# Patient Record
Sex: FEMALE | Race: WHITE | URBAN - METROPOLITAN AREA
[De-identification: names, ages, dates, MRNs, and addresses within clinical notes are randomized per-mention and may not be internally consistent; named-entity substitution may affect disease eponyms.]

---

## 2018-01-08 ENCOUNTER — EMERGENCY (EMERGENCY)
Facility: HOSPITAL | Age: 28
LOS: 0 days | Discharge: HOME | End: 2018-01-08

## 2018-01-08 DIAGNOSIS — R10.13 EPIGASTRIC PAIN: ICD-10-CM

## 2018-01-08 DIAGNOSIS — R11.2 NAUSEA WITH VOMITING, UNSPECIFIED: ICD-10-CM

## 2018-01-08 DIAGNOSIS — O42.90 PREMATURE RUPTURE OF MEMBRANES, UNSPECIFIED AS TO LENGTH OF TIME BETWEEN RUPTURE AND ONSET OF LABOR, UNSPECIFIED WEEKS OF GESTATION: ICD-10-CM

## 2018-02-20 ENCOUNTER — TRANSCRIPTION ENCOUNTER (OUTPATIENT)
Age: 28
End: 2018-02-20

## 2018-02-20 PROBLEM — Z00.00 ENCOUNTER FOR PREVENTIVE HEALTH EXAMINATION: Status: ACTIVE | Noted: 2018-02-20

## 2018-02-26 ENCOUNTER — RX RENEWAL (OUTPATIENT)
Age: 28
End: 2018-02-26

## 2018-03-02 ENCOUNTER — RX RENEWAL (OUTPATIENT)
Age: 28
End: 2018-03-02

## 2018-03-08 ENCOUNTER — LABORATORY RESULT (OUTPATIENT)
Age: 28
End: 2018-03-08

## 2018-03-08 ENCOUNTER — APPOINTMENT (OUTPATIENT)
Dept: OBGYN | Facility: CLINIC | Age: 28
End: 2018-03-08
Payer: MEDICAID

## 2018-03-08 ENCOUNTER — OUTPATIENT (OUTPATIENT)
Dept: OUTPATIENT SERVICES | Facility: HOSPITAL | Age: 28
LOS: 1 days | Discharge: HOME | End: 2018-03-08

## 2018-03-08 VITALS — HEIGHT: 69 IN | WEIGHT: 160 LBS | BODY MASS INDEX: 23.7 KG/M2

## 2018-03-08 DIAGNOSIS — Z34.00 ENCOUNTER FOR SUPERVISION OF NORMAL FIRST PREGNANCY, UNSPECIFIED TRIMESTER: ICD-10-CM

## 2018-03-08 LAB
BILIRUB UR QL STRIP: NORMAL
CLARITY UR: CLEAR
GLUCOSE UR-MCNC: NORMAL
HCG UR QL: NORMAL EU/DL
HGB UR QL STRIP.AUTO: NORMAL
KETONES UR-MCNC: NORMAL
LEUKOCYTE ESTERASE UR QL STRIP: NORMAL
NITRITE UR QL STRIP: NORMAL
PH UR STRIP: 5
PROT UR STRIP-MCNC: NORMAL
SP GR UR STRIP: 1.01

## 2018-03-08 PROCEDURE — 76830 TRANSVAGINAL US NON-OB: CPT

## 2018-03-08 PROCEDURE — 99213 OFFICE O/P EST LOW 20 MIN: CPT | Mod: 25

## 2018-03-08 PROCEDURE — 81003 URINALYSIS AUTO W/O SCOPE: CPT | Mod: QW

## 2018-04-02 ENCOUNTER — TRANSCRIPTION ENCOUNTER (OUTPATIENT)
Age: 28
End: 2018-04-02

## 2018-04-05 ENCOUNTER — APPOINTMENT (OUTPATIENT)
Dept: OBGYN | Facility: CLINIC | Age: 28
End: 2018-04-05
Payer: MEDICAID

## 2018-04-05 VITALS — HEIGHT: 69 IN | BODY MASS INDEX: 23.55 KG/M2 | WEIGHT: 159 LBS

## 2018-04-05 PROCEDURE — 99213 OFFICE O/P EST LOW 20 MIN: CPT

## 2018-04-09 LAB
BILIRUB UR QL STRIP: NORMAL
CLARITY UR: CLEAR
GLUCOSE UR-MCNC: NORMAL
HCG UR QL: NORMAL EU/DL
HGB UR QL STRIP.AUTO: NORMAL
KETONES UR-MCNC: NORMAL
LEUKOCYTE ESTERASE UR QL STRIP: NORMAL
NITRITE UR QL STRIP: NORMAL
PH UR STRIP: NORMAL
PROT UR STRIP-MCNC: NORMAL
SP GR UR STRIP: NORMAL

## 2018-04-11 ENCOUNTER — NON-APPOINTMENT (OUTPATIENT)
Age: 28
End: 2018-04-11

## 2018-04-26 ENCOUNTER — NON-APPOINTMENT (OUTPATIENT)
Age: 28
End: 2018-04-26

## 2018-04-26 ENCOUNTER — APPOINTMENT (OUTPATIENT)
Dept: OBGYN | Facility: CLINIC | Age: 28
End: 2018-04-26
Payer: MEDICAID

## 2018-04-26 VITALS — BODY MASS INDEX: 23.63 KG/M2 | DIASTOLIC BLOOD PRESSURE: 70 MMHG | WEIGHT: 160 LBS | SYSTOLIC BLOOD PRESSURE: 110 MMHG

## 2018-04-26 LAB
GLUCOSE UR-MCNC: NORMAL
HGB UR QL STRIP.AUTO: NORMAL
KETONES UR-MCNC: NORMAL
LEUKOCYTE ESTERASE UR QL STRIP: NORMAL
NITRITE UR QL STRIP: NORMAL
PROT UR STRIP-MCNC: NORMAL

## 2018-04-26 PROCEDURE — 99213 OFFICE O/P EST LOW 20 MIN: CPT

## 2018-04-30 ENCOUNTER — NON-APPOINTMENT (OUTPATIENT)
Age: 28
End: 2018-04-30

## 2018-05-10 ENCOUNTER — NON-APPOINTMENT (OUTPATIENT)
Age: 28
End: 2018-05-10

## 2018-05-17 ENCOUNTER — LABORATORY RESULT (OUTPATIENT)
Age: 28
End: 2018-05-17

## 2018-05-17 ENCOUNTER — OUTPATIENT (OUTPATIENT)
Dept: OUTPATIENT SERVICES | Facility: HOSPITAL | Age: 28
LOS: 1 days | Discharge: HOME | End: 2018-05-17

## 2018-05-17 ENCOUNTER — APPOINTMENT (OUTPATIENT)
Dept: OBGYN | Facility: CLINIC | Age: 28
End: 2018-05-17
Payer: MEDICAID

## 2018-05-17 VITALS — HEIGHT: 69 IN | WEIGHT: 164 LBS | BODY MASS INDEX: 24.29 KG/M2

## 2018-05-17 DIAGNOSIS — Z34.90 ENCOUNTER FOR SUPERVISION OF NORMAL PREGNANCY, UNSPECIFIED, UNSPECIFIED TRIMESTER: ICD-10-CM

## 2018-05-17 PROCEDURE — 99213 OFFICE O/P EST LOW 20 MIN: CPT

## 2018-05-21 LAB — ABO + RH PNL BLD: NORMAL

## 2018-05-22 ENCOUNTER — APPOINTMENT (OUTPATIENT)
Dept: OBGYN | Facility: CLINIC | Age: 28
End: 2018-05-22
Payer: MEDICAID

## 2018-05-22 PROCEDURE — 76815 OB US LIMITED FETUS(S): CPT

## 2018-05-22 PROCEDURE — 76705 ECHO EXAM OF ABDOMEN: CPT

## 2018-05-24 ENCOUNTER — NON-APPOINTMENT (OUTPATIENT)
Age: 28
End: 2018-05-24

## 2018-05-26 ENCOUNTER — NON-APPOINTMENT (OUTPATIENT)
Age: 28
End: 2018-05-26

## 2018-06-06 ENCOUNTER — CLINICAL ADVICE (OUTPATIENT)
Age: 28
End: 2018-06-06

## 2018-06-07 ENCOUNTER — APPOINTMENT (OUTPATIENT)
Dept: OBGYN | Facility: CLINIC | Age: 28
End: 2018-06-07
Payer: MEDICAID

## 2018-06-07 ENCOUNTER — NON-APPOINTMENT (OUTPATIENT)
Age: 28
End: 2018-06-07

## 2018-06-07 VITALS — WEIGHT: 168 LBS | BODY MASS INDEX: 24.81 KG/M2

## 2018-06-07 LAB
BILIRUB UR QL STRIP: NORMAL
GLUCOSE UR-MCNC: NORMAL
HCG UR QL: 1 EU/DL
HGB UR QL STRIP.AUTO: NORMAL
KETONES UR-MCNC: NORMAL
LEUKOCYTE ESTERASE UR QL STRIP: NORMAL
NITRITE UR QL STRIP: NORMAL
PH UR STRIP: 5.5
PROT UR STRIP-MCNC: 30
SP GR UR STRIP: 1.03

## 2018-06-07 PROCEDURE — 76815 OB US LIMITED FETUS(S): CPT

## 2018-06-07 PROCEDURE — 99213 OFFICE O/P EST LOW 20 MIN: CPT

## 2018-06-08 ENCOUNTER — APPOINTMENT (OUTPATIENT)
Dept: ANTEPARTUM | Facility: CLINIC | Age: 28
End: 2018-06-08

## 2018-06-08 ENCOUNTER — OUTPATIENT (OUTPATIENT)
Dept: OUTPATIENT SERVICES | Facility: HOSPITAL | Age: 28
LOS: 1 days | Discharge: HOME | End: 2018-06-08

## 2018-06-08 DIAGNOSIS — O28.3 ABNORMAL ULTRASONIC FINDING ON ANTENATAL SCREENING OF MOTHER: ICD-10-CM

## 2018-06-08 DIAGNOSIS — Q79.9 CONGENITAL MALFORMATION OF MUSCULOSKELETAL SYSTEM, UNSPECIFIED: ICD-10-CM

## 2018-06-08 DIAGNOSIS — O35.8XX1 MATERNAL CARE FOR OTHER (SUSPECTED) FETAL ABNORMALITY AND DAMAGE, FETUS 1: ICD-10-CM

## 2018-06-08 DIAGNOSIS — O09.92 SUPERVISION OF HIGH RISK PREGNANCY, UNSPECIFIED, SECOND TRIMESTER: ICD-10-CM

## 2018-06-08 DIAGNOSIS — Z3A.20 20 WEEKS GESTATION OF PREGNANCY: ICD-10-CM

## 2018-06-08 RX ORDER — ONDANSETRON 4 MG/1
4 TABLET, ORALLY DISINTEGRATING ORAL
Qty: 6 | Refills: 0 | Status: COMPLETED | COMMUNITY
Start: 2018-01-08

## 2018-06-12 ENCOUNTER — APPOINTMENT (OUTPATIENT)
Dept: OBGYN | Facility: HOSPITAL | Age: 28
End: 2018-06-12

## 2018-06-12 ENCOUNTER — INPATIENT (INPATIENT)
Facility: HOSPITAL | Age: 28
LOS: 0 days | Discharge: HOME | End: 2018-06-13
Attending: OBSTETRICS & GYNECOLOGY | Admitting: OBSTETRICS & GYNECOLOGY
Payer: MEDICAID

## 2018-06-12 VITALS — DIASTOLIC BLOOD PRESSURE: 67 MMHG | HEART RATE: 71 BPM | SYSTOLIC BLOOD PRESSURE: 110 MMHG

## 2018-06-12 LAB
AMPHET UR-MCNC: NEGATIVE — SIGNIFICANT CHANGE UP
APPEARANCE UR: CLEAR — SIGNIFICANT CHANGE UP
BARBITURATES UR SCN-MCNC: NEGATIVE — SIGNIFICANT CHANGE UP
BASOPHILS # BLD AUTO: 0.02 K/UL — SIGNIFICANT CHANGE UP (ref 0–0.2)
BASOPHILS NFR BLD AUTO: 0.2 % — SIGNIFICANT CHANGE UP (ref 0–1)
BENZODIAZ UR-MCNC: NEGATIVE — SIGNIFICANT CHANGE UP
BILIRUB UR-MCNC: NEGATIVE — SIGNIFICANT CHANGE UP
BLD GP AB SCN SERPL QL: SIGNIFICANT CHANGE UP
COCAINE METAB.OTHER UR-MCNC: NEGATIVE — SIGNIFICANT CHANGE UP
COLOR SPEC: YELLOW — SIGNIFICANT CHANGE UP
DIFF PNL FLD: NEGATIVE — SIGNIFICANT CHANGE UP
EOSINOPHIL # BLD AUTO: 0.14 K/UL — SIGNIFICANT CHANGE UP (ref 0–0.7)
EOSINOPHIL NFR BLD AUTO: 1.2 % — SIGNIFICANT CHANGE UP (ref 0–8)
GLUCOSE UR QL: NEGATIVE MG/DL — SIGNIFICANT CHANGE UP
HCT VFR BLD CALC: 34 % — LOW (ref 37–47)
HGB BLD-MCNC: 11.4 G/DL — LOW (ref 12–16)
IMM GRANULOCYTES NFR BLD AUTO: 0.9 % — HIGH (ref 0.1–0.3)
KETONES UR-MCNC: NEGATIVE — SIGNIFICANT CHANGE UP
LEUKOCYTE ESTERASE UR-ACNC: NEGATIVE — SIGNIFICANT CHANGE UP
LYMPHOCYTES # BLD AUTO: 18.2 % — LOW (ref 20.5–51.1)
LYMPHOCYTES # BLD AUTO: 2.1 K/UL — SIGNIFICANT CHANGE UP (ref 1.2–3.4)
MCHC RBC-ENTMCNC: 30.8 PG — SIGNIFICANT CHANGE UP (ref 27–31)
MCHC RBC-ENTMCNC: 33.5 G/DL — SIGNIFICANT CHANGE UP (ref 32–37)
MCV RBC AUTO: 91.9 FL — SIGNIFICANT CHANGE UP (ref 81–99)
METHADONE UR-MCNC: NEGATIVE — SIGNIFICANT CHANGE UP
MONOCYTES # BLD AUTO: 0.56 K/UL — SIGNIFICANT CHANGE UP (ref 0.1–0.6)
MONOCYTES NFR BLD AUTO: 4.8 % — SIGNIFICANT CHANGE UP (ref 1.7–9.3)
NEUTROPHILS # BLD AUTO: 8.65 K/UL — HIGH (ref 1.4–6.5)
NEUTROPHILS NFR BLD AUTO: 74.7 % — SIGNIFICANT CHANGE UP (ref 42.2–75.2)
NITRITE UR-MCNC: NEGATIVE — SIGNIFICANT CHANGE UP
NRBC # BLD: 0 /100 WBCS — SIGNIFICANT CHANGE UP (ref 0–0)
OPIATES UR-MCNC: NEGATIVE — SIGNIFICANT CHANGE UP
PCP SPEC-MCNC: SIGNIFICANT CHANGE UP
PH UR: 7.5 — SIGNIFICANT CHANGE UP (ref 5–8)
PLATELET # BLD AUTO: 177 K/UL — SIGNIFICANT CHANGE UP (ref 130–400)
PRENATAL SYPHILIS TEST: SIGNIFICANT CHANGE UP
PROPOXYPHENE QUALITATIVE URINE RESULT: NEGATIVE — SIGNIFICANT CHANGE UP
PROT UR-MCNC: NEGATIVE MG/DL — SIGNIFICANT CHANGE UP
RBC # BLD: 3.7 M/UL — LOW (ref 4.2–5.4)
RBC # FLD: 11.8 % — SIGNIFICANT CHANGE UP (ref 11.5–14.5)
SP GR SPEC: 1.01 — SIGNIFICANT CHANGE UP (ref 1.01–1.03)
TYPE + AB SCN PNL BLD: SIGNIFICANT CHANGE UP
UROBILINOGEN FLD QL: 0.2 MG/DL — SIGNIFICANT CHANGE UP (ref 0.2–0.2)
WBC # BLD: 11.57 K/UL — HIGH (ref 4.8–10.8)
WBC # FLD AUTO: 11.57 K/UL — HIGH (ref 4.8–10.8)

## 2018-06-12 PROCEDURE — 59855 INDUCED ABORTION 1+VAG SUPP: CPT

## 2018-06-12 RX ORDER — NALOXONE HYDROCHLORIDE 4 MG/.1ML
0.1 SPRAY NASAL
Qty: 0 | Refills: 0 | Status: DISCONTINUED | OUTPATIENT
Start: 2018-06-12 | End: 2018-06-13

## 2018-06-12 RX ORDER — ACETAMINOPHEN 500 MG
650 TABLET ORAL ONCE
Qty: 0 | Refills: 0 | Status: COMPLETED | OUTPATIENT
Start: 2018-06-12 | End: 2018-06-12

## 2018-06-12 RX ORDER — OXYTOCIN 10 UNIT/ML
125 VIAL (ML) INJECTION
Qty: 20 | Refills: 0 | Status: DISCONTINUED | OUTPATIENT
Start: 2018-06-12 | End: 2018-06-13

## 2018-06-12 RX ORDER — SODIUM CHLORIDE 9 MG/ML
1000 INJECTION, SOLUTION INTRAVENOUS
Qty: 0 | Refills: 0 | Status: DISCONTINUED | OUTPATIENT
Start: 2018-06-12 | End: 2018-06-13

## 2018-06-12 RX ORDER — ONDANSETRON 8 MG/1
4 TABLET, FILM COATED ORAL EVERY 6 HOURS
Qty: 0 | Refills: 0 | Status: DISCONTINUED | OUTPATIENT
Start: 2018-06-12 | End: 2018-06-13

## 2018-06-12 RX ORDER — FENTANYL/BUPIVACAINE/NS/PF 2MCG/ML-.1
250 PLASTIC BAG, INJECTION (ML) INJECTION
Qty: 0 | Refills: 0 | Status: DISCONTINUED | OUTPATIENT
Start: 2018-06-12 | End: 2018-06-13

## 2018-06-12 RX ORDER — SODIUM CHLORIDE 9 MG/ML
500 INJECTION, SOLUTION INTRAVENOUS ONCE
Qty: 0 | Refills: 0 | Status: DISCONTINUED | OUTPATIENT
Start: 2018-06-12 | End: 2018-06-13

## 2018-06-12 RX ADMIN — Medication 650 MILLIGRAM(S): at 22:57

## 2018-06-12 NOTE — PROCEDURE NOTE - ADDITIONAL PROCEDURE DETAILS
19.30pm Top off give to pt. 100mcg fentanyl with 5cc 2% lidocaine. 19.30pm Top off give to pt. 100mcg fentanyl with 5cc 2% lidocaine.  22.30pm Top off given to pt. 100mcg fentanyl with 4cc .25% bupivacaine.

## 2018-06-12 NOTE — OB PROVIDER H&P - ASSESSMENT
29 y/o  at 21w2d GA, with severe skeletal dysplasia, for IOL with cytotec.   - admit to L&D  - clear liquids  - pain management PRN  - admission labs  - cytotec 400 mg q3h for up to 5 doses.     Dr. Cage and Dr. Grayson aware. 27 y/o  at 21w2d GA, with severe skeletal dysplasia, breech presentation, for IOL with cytotec.   - admit to L&D  - clear liquids  - pain management PRN  - admission labs  - cytotec 400 mg q3h for up to 5 doses.     Dr. Cage and Dr. Grayson aware.

## 2018-06-12 NOTE — OB PROVIDER H&P - ATTENDING COMMENTS
29yo  at 21w2d admitted for IOL secondary to fetal anomalies non compatible with life.  Patient counseled extensively regarding options for pregnancy after anatomy US demonstrated severe skeletal dysplasia. Expressed desire for termination. D&E vs Cytotec induction with all the risks and benefits discussed. Decided on induction.  Plan for 400mcg Cytotec q3h vaginally up to 5 doses.  Patient desires cytogenetic testing and autopsy if possible.

## 2018-06-12 NOTE — PROGRESS NOTE ADULT - SUBJECTIVE AND OBJECTIVE BOX
PGY 2 note    Pt seen and examined at bedside for cytotec #4, 1.5/50/.3, intact.  Pt starting to feel contraction pain, mostly in her lower back.  Requesting pain mgmt at this time.  Anesthesia called.    Dr Cage and Dr Grayson aware

## 2018-06-12 NOTE — OB PROVIDER H&P - HISTORY OF PRESENT ILLNESS
29 y/o  at 21w2d dated by LMP c/w first trimester sonogram, presents for IOL for fetal anomalies. On anatomy scan fetus noted to have skeletal dysplasia with multiple shortened bones. Patient went to [facility] and genetic counseling and decided she wants to terminate the pregnancy. Declined amniocentesis for genetic workup. Denies abdominal cramping, vaginal bleeding and LOF. Feels good fetal movements. Otherwise pregnancy was uncomplicated.

## 2018-06-12 NOTE — OB RN PATIENT PROFILE - NS_NPO_OBGYN_ALL_OB_DT
Pharmacokinetic Consult - Vancomycin Initiation    Pharmacy consulted to manage vancomycin therapy for this 48 yo male with complicated skin and soft tissue infection s/p L 4th/5th toe amputation 1/30/17.     Objective  Lab Results   Component Value Date    CREATININE 0.60 02/03/2017     Estimated Creatinine Clearance: 215.1 mL/min (by C-G formula based on Cr of 0.6).     Wt Readings from Last 3 Encounters:   02/13/17 277 lb 6.4 oz (126 kg)   02/03/17 262 lb 12.8 oz (119 kg)   01/25/17 244 lb (111 kg)     Lab Results   Component Value Date    WBC 10.83 (H) 02/03/2017     Assessment/Plan    Pharmacy previously managed vancomycin therapy for Mr. Crystal last month. Trough of 11.4 mcg/mL on 2/2/17from a maintenance regimen of 1500 mg vancomycin IV q12h     Will load patient with 2000 mg IV vancomycin tonight then begin maintenance therapy tomorrow at noon with 1500 mg IV q12h. Will check serum vancomycin level Wednesday prior to 4th dose at noon. Pharmacy will continue to follow closely and adjust therapy as needed for target trough 10-15 mcg/mL.    Thank you for this consult.  Brad Flanagan IV, PharmD, BCPS  2/13/2017  8:10 PM    Addendum:    Discussed with CHINTAN Garcia - patient has been receiving vancomycin at outside facility, maintained on regimen started at [facility] earlier this month. Last dose from MAR this morning ~0900. Cancel 2000 mg IV loading dose, resume 1500 mg IV q12h tonight and assess a level tomorrow morning to ensure kinetics.     Thanks  Brad Flanagan IV, PharmD, BCPS  2/13/2017  8:21 PM       11-Jun-2018 12:00

## 2018-06-12 NOTE — OB PROVIDER H&P - NSHPLABSRESULTS_GEN_ALL_CORE
varicella immune  sequential screen negative    Sonograms:  20w5d - breech, 3VC, posterior placenta, normal AF,  grams, abnormal fetal anatomy (short left forearm, short right forearm, bowed and short right and left femurs, short right and left lower legs, micromelia).   20w4d - transverse position, fundal placenta, growth discrepancy of upper and lower extremities, skeletal dysplasia cannot be excluded  18w2d - 3 weeks discrepancy of tibia, fibula, humerus, and femur length. Suboptimal visualization of heart.   13w1d - viable IUP

## 2018-06-12 NOTE — OB PROVIDER H&P - NS_OBGYNHISTORY_OBGYN_ALL_OB_FT
Ob Hx: NSVDX1, full term, no complications, 7-4  Gyn Hx: h/o HPV 6 years ago, s/p colpo that was normal. Since then normal PAP smears. H/o ovarian cyst (conservative management). Denies fibroids and STDs.

## 2018-06-12 NOTE — OB PROVIDER H&P - NSNYCREQUIREMENTS_OBGYN_ALL_OB
ADD [facility] REQUIREMENTS SECTION ([facility]/J/SI)...

## 2018-06-12 NOTE — PROGRESS NOTE ADULT - SUBJECTIVE AND OBJECTIVE BOX
PGY1 Note:    Pt evaluated at bedside, feeling comfortable s/p recent epidural redose. Cytotec #5 placed, 400mcg, SVE 1-2/50/-3. Pt temp 100.4F, likely due to cytotec effect. No fundal tenderness, denies fevers or chills, breasts nontender, no calf swelling will continue to observe as no sign of infection.    -c/w cytotec protocol, cyctotec #5 given  -pain management PRN  -anticipate vaginal delivery    Dr. Grayson and Dr. Dailey aware

## 2018-06-12 NOTE — OB PROVIDER H&P - NSHPPHYSICALEXAM_GEN_ALL_CORE
Vital Signs Last 24 Hrs  T(C): 36.6 (12 Jun 2018 08:10), Max: 36.6 (12 Jun 2018 07:42)  T(F): 97.9 (12 Jun 2018 08:10), Max: 97.9 (12 Jun 2018 08:10)  HR: 71 (12 Jun 2018 08:10) (71 - 71)  BP: 110/67 (12 Jun 2018 08:10) (110/67 - 110/67)  RR: 18 (12 Jun 2018 08:10) (18 - 18)    abd: soft, gravid, nontender, no palpable ctx  SVE: Vital Signs Last 24 Hrs  T(C): 36.6 (12 Jun 2018 08:10), Max: 36.6 (12 Jun 2018 07:42)  T(F): 97.9 (12 Jun 2018 08:10), Max: 97.9 (12 Jun 2018 08:10)  HR: 71 (12 Jun 2018 08:10) (71 - 71)  BP: 110/67 (12 Jun 2018 08:10) (110/67 - 110/67)  RR: 18 (12 Jun 2018 08:10) (18 - 18)    abd: soft, gravid, nontender, no palpable ctx  FHR: 138 bpm  SVE: Vital Signs Last 24 Hrs  T(C): 36.6 (12 Jun 2018 08:10), Max: 36.6 (12 Jun 2018 07:42)  T(F): 97.9 (12 Jun 2018 08:10), Max: 97.9 (12 Jun 2018 08:10)  HR: 71 (12 Jun 2018 08:10) (71 - 71)  BP: 110/67 (12 Jun 2018 08:10) (110/67 - 110/67)  RR: 18 (12 Jun 2018 08:10) (18 - 18)    abd: soft, gravid, nontender, no palpable ctx  FHR: 138 bpm  SVE: c/l/p

## 2018-06-12 NOTE — PROGRESS NOTE ADULT - SUBJECTIVE AND OBJECTIVE BOX
PGY2 note    Pt seen and examined at bedside for possible SROM, red fluid noted on pad, moderate amount.    T(F): 98.24  HR: 73  BP: 110/58  SpO2: --      SVE: 1/L/ant, SROM blood tinged, breech    Meds:  fentaNYL (2 MICROgram(s)/mL) + BUpivacaine 0.1%  in Sodium Chloride 0.9% Epidural Drip 250 milliLiter(s) <Continuous>  lactated ringers Bolus 500 milliLiter(s) once  lactated ringers. 1000 milliLiter(s) (125 mL/Hr) <Continuous>  misoprostol 400 MICROGram(s) every 3 hours  oxytocin Infusion 125 milliUNIT(s)/Min (375 mL/Hr) <Continuous>      Labs:                        11.4   11.57 )-----------( 177      ( 10:31 )             34.0     Urinalysis Basic - ( 2018 13:41 )    Color: Yellow / Appearance: Clear / S.010 / pH: x  Gluc: x / Ketone: Negative  / Bili: Negative / Urobili: 0.2 mg/dL   Blood: x / Protein: Negative mg/dL / Nitrite: Negative   Leuk Esterase: Negative / RBC: x / WBC x   Sq Epi: x / Non Sq Epi: x / Bacteria: x PGY2 note    Pt seen and examined at bedside for possible SROM, red fluid noted on pad, moderate amount.    T(F): 98.24  HR: 73  BP: 110/58  SpO2: --      SVE: 1/L/ant, membranes palpated, intact, breech    Meds:  fentaNYL (2 MICROgram(s)/mL) + BUpivacaine 0.1%  in Sodium Chloride 0.9% Epidural Drip 250 milliLiter(s) <Continuous>  lactated ringers Bolus 500 milliLiter(s) once  lactated ringers. 1000 milliLiter(s) (125 mL/Hr) <Continuous>  misoprostol 400 MICROGram(s) every 3 hours  oxytocin Infusion 125 milliUNIT(s)/Min (375 mL/Hr) <Continuous>      Labs:                        11.4   11.57 )-----------( 177      ( 10:31 )             34.0     Urinalysis Basic - ( 2018 13:41 )    Color: Yellow / Appearance: Clear / S.010 / pH: x  Gluc: x / Ketone: Negative  / Bili: Negative / Urobili: 0.2 mg/dL   Blood: x / Protein: Negative mg/dL / Nitrite: Negative   Leuk Esterase: Negative / RBC: x / WBC x   Sq Epi: x / Non Sq Epi: x / Bacteria: x

## 2018-06-13 ENCOUNTER — TRANSCRIPTION ENCOUNTER (OUTPATIENT)
Age: 28
End: 2018-06-13

## 2018-06-13 ENCOUNTER — RESULT REVIEW (OUTPATIENT)
Age: 28
End: 2018-06-13

## 2018-06-13 VITALS — TEMPERATURE: 99 F | SYSTOLIC BLOOD PRESSURE: 90 MMHG | DIASTOLIC BLOOD PRESSURE: 53 MMHG | HEART RATE: 60 BPM

## 2018-06-13 LAB
BASOPHILS # BLD AUTO: 0.01 K/UL — SIGNIFICANT CHANGE UP (ref 0–0.2)
BASOPHILS NFR BLD AUTO: 0.1 % — SIGNIFICANT CHANGE UP (ref 0–1)
EOSINOPHIL # BLD AUTO: 0.12 K/UL — SIGNIFICANT CHANGE UP (ref 0–0.7)
EOSINOPHIL NFR BLD AUTO: 1 % — SIGNIFICANT CHANGE UP (ref 0–8)
HCT VFR BLD CALC: 29.3 % — LOW (ref 37–47)
HGB BLD-MCNC: 10 G/DL — LOW (ref 12–16)
IMM GRANULOCYTES NFR BLD AUTO: 1 % — HIGH (ref 0.1–0.3)
LYMPHOCYTES # BLD AUTO: 1.53 K/UL — SIGNIFICANT CHANGE UP (ref 1.2–3.4)
LYMPHOCYTES # BLD AUTO: 12.4 % — LOW (ref 20.5–51.1)
MCHC RBC-ENTMCNC: 31 PG — SIGNIFICANT CHANGE UP (ref 27–31)
MCHC RBC-ENTMCNC: 34.1 G/DL — SIGNIFICANT CHANGE UP (ref 32–37)
MCV RBC AUTO: 90.7 FL — SIGNIFICANT CHANGE UP (ref 81–99)
MONOCYTES # BLD AUTO: 0.62 K/UL — HIGH (ref 0.1–0.6)
MONOCYTES NFR BLD AUTO: 5 % — SIGNIFICANT CHANGE UP (ref 1.7–9.3)
NEUTROPHILS # BLD AUTO: 9.96 K/UL — HIGH (ref 1.4–6.5)
NEUTROPHILS NFR BLD AUTO: 80.5 % — HIGH (ref 42.2–75.2)
NRBC # BLD: 0 /100 WBCS — SIGNIFICANT CHANGE UP (ref 0–0)
PLATELET # BLD AUTO: 143 K/UL — SIGNIFICANT CHANGE UP (ref 130–400)
RBC # BLD: 3.23 M/UL — LOW (ref 4.2–5.4)
RBC # FLD: 11.7 % — SIGNIFICANT CHANGE UP (ref 11.5–14.5)
WBC # BLD: 12.36 K/UL — HIGH (ref 4.8–10.8)
WBC # FLD AUTO: 12.36 K/UL — HIGH (ref 4.8–10.8)

## 2018-06-13 RX ORDER — IBUPROFEN 200 MG
600 TABLET ORAL EVERY 6 HOURS
Qty: 0 | Refills: 0 | Status: DISCONTINUED | OUTPATIENT
Start: 2018-06-13 | End: 2018-06-13

## 2018-06-13 RX ORDER — ACETAMINOPHEN 500 MG
650 TABLET ORAL EVERY 6 HOURS
Qty: 0 | Refills: 0 | Status: DISCONTINUED | OUTPATIENT
Start: 2018-06-13 | End: 2018-06-13

## 2018-06-13 RX ORDER — HYDROCORTISONE 1 %
1 OINTMENT (GRAM) TOPICAL
Qty: 0 | Refills: 0 | DISCHARGE
Start: 2018-06-13

## 2018-06-13 RX ORDER — DIBUCAINE 1 %
1 OINTMENT (GRAM) RECTAL EVERY 4 HOURS
Qty: 0 | Refills: 0 | Status: DISCONTINUED | OUTPATIENT
Start: 2018-06-13 | End: 2018-06-13

## 2018-06-13 RX ORDER — OXYCODONE AND ACETAMINOPHEN 5; 325 MG/1; MG/1
1 TABLET ORAL
Qty: 0 | Refills: 0 | Status: DISCONTINUED | OUTPATIENT
Start: 2018-06-13 | End: 2018-06-13

## 2018-06-13 RX ORDER — DIPHENHYDRAMINE HCL 50 MG
25 CAPSULE ORAL EVERY 6 HOURS
Qty: 0 | Refills: 0 | Status: DISCONTINUED | OUTPATIENT
Start: 2018-06-13 | End: 2018-06-13

## 2018-06-13 RX ORDER — DOCUSATE SODIUM 100 MG
100 CAPSULE ORAL
Qty: 0 | Refills: 0 | Status: DISCONTINUED | OUTPATIENT
Start: 2018-06-13 | End: 2018-06-13

## 2018-06-13 RX ORDER — HYDROCORTISONE 1 %
1 OINTMENT (GRAM) TOPICAL EVERY 4 HOURS
Qty: 0 | Refills: 0 | Status: DISCONTINUED | OUTPATIENT
Start: 2018-06-13 | End: 2018-06-13

## 2018-06-13 RX ORDER — LANOLIN
1 OINTMENT (GRAM) TOPICAL EVERY 6 HOURS
Qty: 0 | Refills: 0 | Status: DISCONTINUED | OUTPATIENT
Start: 2018-06-13 | End: 2018-06-13

## 2018-06-13 RX ORDER — AER TRAVELER 0.5 G/1
1 SOLUTION RECTAL; TOPICAL EVERY 4 HOURS
Qty: 0 | Refills: 0 | Status: DISCONTINUED | OUTPATIENT
Start: 2018-06-13 | End: 2018-06-13

## 2018-06-13 RX ORDER — IBUPROFEN 200 MG
1 TABLET ORAL
Qty: 0 | Refills: 0 | DISCHARGE
Start: 2018-06-13

## 2018-06-13 RX ORDER — SIMETHICONE 80 MG/1
80 TABLET, CHEWABLE ORAL EVERY 6 HOURS
Qty: 0 | Refills: 0 | Status: DISCONTINUED | OUTPATIENT
Start: 2018-06-13 | End: 2018-06-13

## 2018-06-13 RX ORDER — GLYCERIN ADULT
1 SUPPOSITORY, RECTAL RECTAL AT BEDTIME
Qty: 0 | Refills: 0 | Status: DISCONTINUED | OUTPATIENT
Start: 2018-06-13 | End: 2018-06-13

## 2018-06-13 RX ORDER — BENZOCAINE 10 %
1 GEL (GRAM) MUCOUS MEMBRANE EVERY 6 HOURS
Qty: 0 | Refills: 0 | Status: DISCONTINUED | OUTPATIENT
Start: 2018-06-13 | End: 2018-06-13

## 2018-06-13 RX ORDER — SODIUM CHLORIDE 9 MG/ML
3 INJECTION INTRAMUSCULAR; INTRAVENOUS; SUBCUTANEOUS EVERY 8 HOURS
Qty: 0 | Refills: 0 | Status: DISCONTINUED | OUTPATIENT
Start: 2018-06-13 | End: 2018-06-13

## 2018-06-13 RX ORDER — AER TRAVELER 0.5 G/1
1 SOLUTION RECTAL; TOPICAL
Qty: 0 | Refills: 0 | DISCHARGE
Start: 2018-06-13

## 2018-06-13 RX ORDER — MAGNESIUM HYDROXIDE 400 MG/1
30 TABLET, CHEWABLE ORAL
Qty: 0 | Refills: 0 | Status: DISCONTINUED | OUTPATIENT
Start: 2018-06-13 | End: 2018-06-13

## 2018-06-13 NOTE — DISCHARGE NOTE OB - CARE PROVIDER_API CALL
Gregory Lundberg), Obstetrics and Gynecology  [address] [place] [address]  Phone: [phone]  Fax: [phone]

## 2018-06-13 NOTE — PROGRESS NOTE ADULT - ASSESSMENT
29 yo  at 21w3d GA, with severe skeletal dysplasia, breech presentation, epidural in place, s/p cytotec 400mcg x5, undergoing IOL  -anticipate vaginal delivery    Dr. Grayson and Dr. Dailey aware

## 2018-06-13 NOTE — DISCHARGE NOTE OB - HOSPITAL COURSE
DATE OF DISCHARGE: 18 @ 10:44    HISTORY OF PRESENT ILLNESS/HOSPITAL COURSE: HPI:  29 y/o  at 21w2d dated by LMP c/w first trimester sonogram, presents for IOL for fetal anomalies. On anatomy scan fetus noted to have skeletal dysplasia with multiple shortened bones. Patient went to [facility] and genetic counseling and decided she wants to terminate the pregnancy. Declined amniocentesis for genetic workup. Denies abdominal cramping, vaginal bleeding and LOF. Feels good fetal movements. Otherwise pregnancy was uncomplicated. (2018 09:20)    PAST MEDICAL & SURGICAL HISTORY:  No pertinent past medical history  No significant past surgical history      PROCEDURES PERFORMED:     COMPLICATIONS:      POST PARTUM COURSE:       FINAL DIAGNOSIS:      LABS:                       10.0   12.36 )-----------( 143      ( 2018 10:13 )             29.3       DISCHARGE INSTRUCTIONS:      If you have severe abdominal pain, heavy vaginal bleeding, shortness of breath or chest pain please call your doctor or come to the emergency room.   DIET:  Advance as tolerated.  ACTIVITY:  Advance as tolerated.  Pelvic rest for 6 weeks.  Nothing to be inserted into the vagina for 6 weeks, i.e. no tampons, douching, sexual relations, or tub baths.   FOLLOW UP: Make an appointment to see your doctor as instructed   PRESCRIPTIONS: Prescriptions:

## 2018-06-13 NOTE — DISCHARGE NOTE OB - MEDICATION SUMMARY - MEDICATIONS TO TAKE
I will START or STAY ON the medications listed below when I get home from the hospital:    ibuprofen 600 mg oral tablet  -- 1 tab(s) by mouth every 6 hours, As needed, Moderate Pain  -- Indication: For pain    hydrocortisone 1% topical cream  -- 1 application on skin every 4 hours, As needed, Moderate to Severe Perineal Pain  -- Indication: For perineal discomfort    witch hazel 50% topical pad  -- 1 application on skin every 4 hours, As needed, Perineal Discomfort  -- Indication: For 21WKS W/CYTOTEC INDUCTION

## 2018-06-13 NOTE — DISCHARGE NOTE OB - CARE PLAN
Principal Discharge DX:	Termination of pregnancy (fetus)  Goal:	vaginal delivery Principal Discharge DX:	Termination of pregnancy (fetus)  Goal:	vaginal delivery  Assessment and plan of treatment:	vaginal delivery

## 2018-06-13 NOTE — OB PROVIDER DELIVERY SUMMARY - NSPROVIDERDELIVERYNOTE_OBGYN_ALL_OB_FT
Patient complaining of pressure. On exam fetal feet and buttocks were found to be protruding from the vagina within the amniotic sac. Fetal head found sitting in vagina, passed the cervix. Delivered with maternal pushing effort. No evidence of fetal heart beat or respiratory effort. Cord clamped and cut. Placenta delivered spontaneously, appeared intact, along with about 200cc of blood clots. Fundus firm, below umbilicus. Postpartum Pitocin initiated. Good hemostasis. Tissue at placenta cord insertion collected for [employer].    Exam of fetus: Female, Apgars 0 and 0. Normal appearing facial features with fused eyelids. Low set ears. Small chest with large abdomen. Both lower and upper extremities demonstrated shortened limbs with bilateral polydactyly. Spine intact.

## 2018-06-13 NOTE — DISCHARGE NOTE OB - PATIENT PORTAL LINK FT
You can access the [facility] Patient Portal, offered by [facility], by registering with the following website: [email or web address]

## 2018-06-13 NOTE — PROGRESS NOTE ADULT - SUBJECTIVE AND OBJECTIVE BOX
PGY1 Note:    Pt examined for LOF, confirmed grossly ruptured, with light meconium. Pt is comfortable, not feeling any pain.    Vital Signs Last 24 Hrs  T(F): 99.86 (13 Jun 2018 00:31), Max: 100.4 (12 Jun 2018 22:37)  HR: 70 (13 Jun 2018 01:51) (67 - 117)  BP: 95/60 (13 Jun 2018 01:51) (75/37 - 182/131)  RR: 18 (13 Jun 2018 00:31) (18 - 20)    SVE: SROM at 0120, 7/100/+2, palpable fetal parts in vagina    Meds: cytotec 400mcg x5 doses given

## 2018-06-18 DIAGNOSIS — Z37.1 SINGLE STILLBIRTH: ICD-10-CM

## 2018-06-18 DIAGNOSIS — O35.8XX0 MATERNAL CARE FOR OTHER (SUSPECTED) FETAL ABNORMALITY AND DAMAGE, NOT APPLICABLE OR UNSPECIFIED: ICD-10-CM

## 2018-06-18 DIAGNOSIS — Z3A.21 21 WEEKS GESTATION OF PREGNANCY: ICD-10-CM

## 2018-06-18 LAB — SURGICAL PATHOLOGY STUDY: SIGNIFICANT CHANGE UP

## 2018-06-27 ENCOUNTER — APPOINTMENT (OUTPATIENT)
Dept: OBGYN | Facility: CLINIC | Age: 28
End: 2018-06-27
Payer: MEDICAID

## 2018-06-27 VITALS — BODY MASS INDEX: 24.51 KG/M2 | DIASTOLIC BLOOD PRESSURE: 80 MMHG | WEIGHT: 166 LBS | SYSTOLIC BLOOD PRESSURE: 120 MMHG

## 2018-06-27 PROCEDURE — 99024 POSTOP FOLLOW-UP VISIT: CPT

## 2018-07-03 LAB — CHROM ANALY OVERALL INTERP SPEC-IMP: SIGNIFICANT CHANGE UP

## 2018-08-01 LAB — MICROARRAY DELETION: SIGNIFICANT CHANGE UP

## 2018-11-28 ENCOUNTER — APPOINTMENT (OUTPATIENT)
Dept: OBGYN | Facility: CLINIC | Age: 28
End: 2018-11-28
Payer: MEDICAID

## 2018-11-28 ENCOUNTER — OUTPATIENT (OUTPATIENT)
Dept: OUTPATIENT SERVICES | Facility: HOSPITAL | Age: 28
LOS: 1 days | Discharge: HOME | End: 2018-11-28

## 2018-11-28 VITALS
HEIGHT: 69 IN | SYSTOLIC BLOOD PRESSURE: 110 MMHG | BODY MASS INDEX: 23.99 KG/M2 | DIASTOLIC BLOOD PRESSURE: 70 MMHG | WEIGHT: 162 LBS

## 2018-11-28 DIAGNOSIS — N91.1 SECONDARY AMENORRHEA: ICD-10-CM

## 2018-11-28 DIAGNOSIS — Z80.3 FAMILY HISTORY OF MALIGNANT NEOPLASM OF BREAST: ICD-10-CM

## 2018-11-28 LAB
BILIRUB UR QL STRIP: NORMAL
GLUCOSE UR-MCNC: NORMAL
HCG UR QL: 0.2 EU/DL
HGB UR QL STRIP.AUTO: NORMAL
KETONES UR-MCNC: NORMAL
LEUKOCYTE ESTERASE UR QL STRIP: NORMAL
NITRITE UR QL STRIP: NORMAL
PH UR STRIP: 5.5
PROT UR STRIP-MCNC: NORMAL
SP GR UR STRIP: 1

## 2018-11-28 PROCEDURE — 99213 OFFICE O/P EST LOW 20 MIN: CPT | Mod: 25

## 2018-11-28 PROCEDURE — 87075 CULTR BACTERIA EXCEPT BLOOD: CPT

## 2018-11-28 PROCEDURE — 81003 URINALYSIS AUTO W/O SCOPE: CPT | Mod: QW

## 2018-11-28 PROCEDURE — 76817 TRANSVAGINAL US OBSTETRIC: CPT

## 2018-11-28 RX ORDER — PNV NO.95/FERROUS FUM/FOLIC AC 28MG-0.8MG
TABLET ORAL
Refills: 0 | Status: ACTIVE | COMMUNITY

## 2018-11-28 RX ORDER — MELATONIN 3 MG
25 MCG TABLET ORAL
Refills: 0 | Status: ACTIVE | COMMUNITY

## 2018-11-29 LAB
HCG SERPL-MCNC: 8803 MIU/ML
PROGEST SERPL-MCNC: 22.7 NG/ML

## 2018-12-03 LAB
C TRACH RRNA SPEC QL NAA+PROBE: NOT DETECTED
N GONORRHOEA RRNA SPEC QL NAA+PROBE: NOT DETECTED
SOURCE AMPLIFICATION: NORMAL

## 2018-12-12 ENCOUNTER — APPOINTMENT (OUTPATIENT)
Dept: OBGYN | Facility: CLINIC | Age: 28
End: 2018-12-12
Payer: MEDICAID

## 2018-12-12 ENCOUNTER — OUTPATIENT (OUTPATIENT)
Dept: OUTPATIENT SERVICES | Facility: HOSPITAL | Age: 28
LOS: 1 days | Discharge: HOME | End: 2018-12-12

## 2018-12-12 VITALS — SYSTOLIC BLOOD PRESSURE: 110 MMHG | DIASTOLIC BLOOD PRESSURE: 70 MMHG

## 2018-12-12 DIAGNOSIS — Z34.90 ENCOUNTER FOR SUPERVISION OF NORMAL PREGNANCY, UNSPECIFIED, UNSPECIFIED TRIMESTER: ICD-10-CM

## 2018-12-12 PROCEDURE — 99212 OFFICE O/P EST SF 10 MIN: CPT | Mod: 25

## 2018-12-12 PROCEDURE — 76817 TRANSVAGINAL US OBSTETRIC: CPT

## 2018-12-13 LAB
ABO + RH PNL BLD: NORMAL
BASOPHILS # BLD AUTO: 0.02 K/UL
BASOPHILS NFR BLD AUTO: 0.2 %
BLD GP AB SCN SERPL QL: NORMAL
EOSINOPHIL # BLD AUTO: 0.05 K/UL
EOSINOPHIL NFR BLD AUTO: 0.4 %
HBV SURFACE AG SER QL: NONREACTIVE
HCT VFR BLD CALC: 38.4 %
HGB BLD-MCNC: 13 G/DL
HIV1+2 AB SPEC QL IA.RAPID: NONREACTIVE
IMM GRANULOCYTES NFR BLD AUTO: 0.4 %
LYMPHOCYTES # BLD AUTO: 2 K/UL
LYMPHOCYTES NFR BLD AUTO: 16.5 %
MAN DIFF?: NORMAL
MCHC RBC-ENTMCNC: 31.8 PG
MCHC RBC-ENTMCNC: 33.9 G/DL
MCV RBC AUTO: 93.9 FL
MONOCYTES # BLD AUTO: 0.7 K/UL
MONOCYTES NFR BLD AUTO: 5.8 %
NEUTROPHILS # BLD AUTO: 9.3 K/UL
NEUTROPHILS NFR BLD AUTO: 76.7 %
PLATELET # BLD AUTO: 185 K/UL
PROGEST SERPL-MCNC: 18.7 NG/ML
RBC # BLD: 4.09 M/UL
RBC # FLD: 11.7 %
RUBV IGG FLD-ACNC: 2.8 INDEX
RUBV IGG SER-IMP: POSITIVE
T PALLIDUM AB SER QL IA: NEGATIVE
VZV AB TITR SER: POSITIVE
VZV IGG SER IF-ACNC: 449.7 INDEX
WBC # FLD AUTO: 12.12 K/UL

## 2018-12-14 LAB — BACTERIA UR CULT: NORMAL

## 2018-12-16 LAB
HGB A MFR BLD: 97.3 %
HGB A2 MFR BLD: 2.7 %
HGB FRACT BLD-IMP: NORMAL

## 2019-01-03 ENCOUNTER — APPOINTMENT (OUTPATIENT)
Dept: OBGYN | Facility: CLINIC | Age: 29
End: 2019-01-03
Payer: MEDICAID

## 2019-01-03 ENCOUNTER — NON-APPOINTMENT (OUTPATIENT)
Age: 29
End: 2019-01-03

## 2019-01-03 VITALS — SYSTOLIC BLOOD PRESSURE: 100 MMHG | DIASTOLIC BLOOD PRESSURE: 70 MMHG | BODY MASS INDEX: 24.37 KG/M2 | WEIGHT: 165 LBS

## 2019-01-03 PROCEDURE — 99213 OFFICE O/P EST LOW 20 MIN: CPT | Mod: TH

## 2019-01-18 ENCOUNTER — APPOINTMENT (OUTPATIENT)
Dept: ANTEPARTUM | Facility: CLINIC | Age: 29
End: 2019-01-18

## 2019-01-18 ENCOUNTER — OUTPATIENT (OUTPATIENT)
Dept: OUTPATIENT SERVICES | Facility: HOSPITAL | Age: 29
LOS: 1 days | Discharge: HOME | End: 2019-01-18

## 2019-01-18 ENCOUNTER — RESULT CHARGE (OUTPATIENT)
Age: 29
End: 2019-01-18

## 2019-01-18 VITALS
OXYGEN SATURATION: 100 % | DIASTOLIC BLOOD PRESSURE: 58 MMHG | BODY MASS INDEX: 24 KG/M2 | HEART RATE: 71 BPM | TEMPERATURE: 97.5 F | WEIGHT: 162.5 LBS | SYSTOLIC BLOOD PRESSURE: 108 MMHG

## 2019-01-18 LAB
BILIRUB UR QL STRIP: NEGATIVE
CLARITY UR: CLEAR
COLLECTION METHOD: NORMAL
FETAL HEART RATE (BPM): 159
GLUCOSE UR-MCNC: NEGATIVE
HCG UR QL: 0.2 EU/DL
HGB UR QL STRIP.AUTO: NEGATIVE
KETONES UR-MCNC: NEGATIVE
LEUKOCYTE ESTERASE UR QL STRIP: NEGATIVE
NITRITE UR QL STRIP: NEGATIVE
OB COMMENTS: NORMAL
PH UR STRIP: 8
PROT UR STRIP-MCNC: NEGATIVE
SCHEDULED VISIT: YES
SP GR UR STRIP: 1
URINE ALBUMIN/PROTEIN: NEGATIVE
URINE GLUCOSE: NEGATIVE
URINE KETONES: NEGATIVE
WEEKS GESTATION: NORMAL

## 2019-01-18 NOTE — DISCUSSION/SUMMARY
[FreeTextEntry1] : This fetus appears to have normal chest size and limb length today on ultrasound.

## 2019-01-18 NOTE — SURGICAL HISTORY
[Fibroids] : no fibroids [Abn Paps] : no abnormal pap smears [Breast Disease] : no breast disease [STI's] : no STI's [Infertility] : no infertility [Cysts] : no cysts [OC Use] : no OC use [Last Pap: ___] : Last Pap: [unfilled] [Last Mammo: ___] : Last Mammo: none

## 2019-01-18 NOTE — OB HISTORY
[Pregnancy History] : patient received anesthesia [___] : pregnancy complications occured [LMP: ___] : LMP: [unfilled] [BHAVANI: ___] : BHAVANI: [unfilled] [Spontaneous] : Spontaneous conception [Sonogram] : sonogram [at ___ wks] : at [unfilled] weeks

## 2019-01-22 ENCOUNTER — NON-APPOINTMENT (OUTPATIENT)
Age: 29
End: 2019-01-22

## 2019-01-22 ENCOUNTER — APPOINTMENT (OUTPATIENT)
Dept: OBGYN | Facility: CLINIC | Age: 29
End: 2019-01-22
Payer: MEDICAID

## 2019-01-22 VITALS
WEIGHT: 165 LBS | DIASTOLIC BLOOD PRESSURE: 60 MMHG | HEIGHT: 69 IN | BODY MASS INDEX: 24.44 KG/M2 | SYSTOLIC BLOOD PRESSURE: 110 MMHG

## 2019-01-22 DIAGNOSIS — Z36.82 ENCOUNTER FOR ANTENATAL SCREENING FOR NUCHAL TRANSLUCENCY: ICD-10-CM

## 2019-01-22 DIAGNOSIS — O09.91 SUPERVISION OF HIGH RISK PREGNANCY, UNSPECIFIED, FIRST TRIMESTER: ICD-10-CM

## 2019-01-22 DIAGNOSIS — O35.8XX0 MATERNAL CARE FOR OTHER (SUSPECTED) FETAL ABNORMALITY AND DAMAGE, NOT APPLICABLE OR UNSPECIFIED: ICD-10-CM

## 2019-01-22 DIAGNOSIS — O09.291 SUPERVISION OF PREGNANCY WITH OTHER POOR REPRODUCTIVE OR OBSTETRIC HISTORY, FIRST TRIMESTER: ICD-10-CM

## 2019-01-22 DIAGNOSIS — O35.1XX1 MATERNAL CARE FOR (SUSPECTED) CHROMOSOMAL ABNORMALITY IN FETUS, FETUS 1: ICD-10-CM

## 2019-01-22 DIAGNOSIS — Z3A.12 12 WEEKS GESTATION OF PREGNANCY: ICD-10-CM

## 2019-01-22 PROCEDURE — 99213 OFFICE O/P EST LOW 20 MIN: CPT | Mod: TH

## 2019-02-11 ENCOUNTER — LABORATORY RESULT (OUTPATIENT)
Age: 29
End: 2019-02-11

## 2019-02-11 ENCOUNTER — APPOINTMENT (OUTPATIENT)
Dept: OBGYN | Facility: CLINIC | Age: 29
End: 2019-02-11
Payer: MEDICAID

## 2019-02-11 ENCOUNTER — OUTPATIENT (OUTPATIENT)
Dept: OUTPATIENT SERVICES | Facility: HOSPITAL | Age: 29
LOS: 1 days | Discharge: HOME | End: 2019-02-11

## 2019-02-11 ENCOUNTER — NON-APPOINTMENT (OUTPATIENT)
Age: 29
End: 2019-02-11

## 2019-02-11 VITALS — WEIGHT: 169 LBS | HEIGHT: 63 IN | BODY MASS INDEX: 29.95 KG/M2

## 2019-02-11 DIAGNOSIS — Z34.00 ENCOUNTER FOR SUPERVISION OF NORMAL FIRST PREGNANCY, UNSPECIFIED TRIMESTER: ICD-10-CM

## 2019-02-11 PROCEDURE — 99213 OFFICE O/P EST LOW 20 MIN: CPT | Mod: TH

## 2019-02-20 ENCOUNTER — NON-APPOINTMENT (OUTPATIENT)
Age: 29
End: 2019-02-20

## 2019-03-01 ENCOUNTER — OUTPATIENT (OUTPATIENT)
Dept: OUTPATIENT SERVICES | Facility: HOSPITAL | Age: 29
LOS: 1 days | End: 2019-03-01
Payer: MEDICAID

## 2019-03-01 PROCEDURE — G9001: CPT

## 2019-03-05 ENCOUNTER — APPOINTMENT (OUTPATIENT)
Dept: OBGYN | Facility: CLINIC | Age: 29
End: 2019-03-05
Payer: MEDICAID

## 2019-03-05 ENCOUNTER — OUTPATIENT (OUTPATIENT)
Dept: OUTPATIENT SERVICES | Facility: HOSPITAL | Age: 29
LOS: 1 days | Discharge: HOME | End: 2019-03-05

## 2019-03-05 ENCOUNTER — APPOINTMENT (OUTPATIENT)
Dept: ANTEPARTUM | Facility: CLINIC | Age: 29
End: 2019-03-05

## 2019-03-05 ENCOUNTER — NON-APPOINTMENT (OUTPATIENT)
Age: 29
End: 2019-03-05

## 2019-03-05 VITALS — BODY MASS INDEX: 30.65 KG/M2 | DIASTOLIC BLOOD PRESSURE: 60 MMHG | WEIGHT: 173 LBS | SYSTOLIC BLOOD PRESSURE: 100 MMHG

## 2019-03-05 PROCEDURE — 99213 OFFICE O/P EST LOW 20 MIN: CPT | Mod: TH

## 2019-03-05 PROCEDURE — 81003 URINALYSIS AUTO W/O SCOPE: CPT | Mod: QW

## 2019-03-20 DIAGNOSIS — Z71.89 OTHER SPECIFIED COUNSELING: ICD-10-CM

## 2019-03-26 ENCOUNTER — NON-APPOINTMENT (OUTPATIENT)
Age: 29
End: 2019-03-26

## 2019-03-26 ENCOUNTER — APPOINTMENT (OUTPATIENT)
Dept: OBGYN | Facility: CLINIC | Age: 29
End: 2019-03-26
Payer: MEDICAID

## 2019-03-26 VITALS — WEIGHT: 178 LBS | DIASTOLIC BLOOD PRESSURE: 60 MMHG | BODY MASS INDEX: 31.53 KG/M2 | SYSTOLIC BLOOD PRESSURE: 100 MMHG

## 2019-03-26 PROCEDURE — 99213 OFFICE O/P EST LOW 20 MIN: CPT | Mod: TH

## 2019-04-10 ENCOUNTER — RESULT REVIEW (OUTPATIENT)
Age: 29
End: 2019-04-10

## 2019-04-10 ENCOUNTER — OUTPATIENT (OUTPATIENT)
Dept: OUTPATIENT SERVICES | Facility: HOSPITAL | Age: 29
LOS: 1 days | Discharge: HOME | End: 2019-04-10

## 2019-04-10 DIAGNOSIS — Z34.00 ENCOUNTER FOR SUPERVISION OF NORMAL FIRST PREGNANCY, UNSPECIFIED TRIMESTER: ICD-10-CM

## 2019-04-10 LAB
BASOPHILS # BLD AUTO: 0.01 K/UL
BASOPHILS NFR BLD AUTO: 0.1 %
EOSINOPHIL # BLD AUTO: 0.06 K/UL
EOSINOPHIL NFR BLD AUTO: 0.6 %
GLUCOSE 1H P 100 G GLC PO SERPL-MCNC: 165 MG/DL
HCT VFR BLD CALC: 36.6 %
HGB BLD-MCNC: 12.1 G/DL
IMM GRANULOCYTES NFR BLD AUTO: 0.6 %
LYMPHOCYTES # BLD AUTO: 1.6 K/UL
LYMPHOCYTES NFR BLD AUTO: 15.2 %
MAN DIFF?: NORMAL
MCHC RBC-ENTMCNC: 31.4 PG
MCHC RBC-ENTMCNC: 33.1 G/DL
MCV RBC AUTO: 95.1 FL
MONOCYTES # BLD AUTO: 0.5 K/UL
MONOCYTES NFR BLD AUTO: 4.7 %
NEUTROPHILS # BLD AUTO: 8.32 K/UL
NEUTROPHILS NFR BLD AUTO: 78.8 %
PLATELET # BLD AUTO: 157 K/UL
RBC # BLD: 3.85 M/UL
RBC # FLD: 12.3 %
WBC # FLD AUTO: 10.55 K/UL

## 2019-04-15 ENCOUNTER — NON-APPOINTMENT (OUTPATIENT)
Age: 29
End: 2019-04-15

## 2019-04-15 ENCOUNTER — LABORATORY RESULT (OUTPATIENT)
Age: 29
End: 2019-04-15

## 2019-04-15 ENCOUNTER — OUTPATIENT (OUTPATIENT)
Dept: OUTPATIENT SERVICES | Facility: HOSPITAL | Age: 29
LOS: 1 days | Discharge: HOME | End: 2019-04-15

## 2019-04-15 ENCOUNTER — APPOINTMENT (OUTPATIENT)
Dept: OBGYN | Facility: CLINIC | Age: 29
End: 2019-04-15
Payer: MEDICAID

## 2019-04-15 VITALS — BODY MASS INDEX: 32.25 KG/M2 | WEIGHT: 182 LBS | HEIGHT: 63 IN

## 2019-04-15 DIAGNOSIS — Z34.00 ENCOUNTER FOR SUPERVISION OF NORMAL FIRST PREGNANCY, UNSPECIFIED TRIMESTER: ICD-10-CM

## 2019-04-15 PROCEDURE — 99213 OFFICE O/P EST LOW 20 MIN: CPT | Mod: TH

## 2019-04-17 LAB
BILIRUB UR QL STRIP: NORMAL
CLARITY UR: CLEAR
GLUCOSE UR-MCNC: NORMAL
HCG UR QL: NORMAL EU/DL
HGB UR QL STRIP.AUTO: NORMAL
KETONES UR-MCNC: NORMAL
LEUKOCYTE ESTERASE UR QL STRIP: NORMAL
NITRITE UR QL STRIP: NORMAL
PH UR STRIP: 8
PROT UR STRIP-MCNC: NORMAL
SP GR UR STRIP: 1.01

## 2019-04-18 ENCOUNTER — TRANSCRIPTION ENCOUNTER (OUTPATIENT)
Age: 29
End: 2019-04-18

## 2019-04-18 ENCOUNTER — NON-APPOINTMENT (OUTPATIENT)
Age: 29
End: 2019-04-18

## 2019-04-22 ENCOUNTER — CLINICAL ADVICE (OUTPATIENT)
Age: 29
End: 2019-04-22

## 2019-05-01 ENCOUNTER — OUTPATIENT (OUTPATIENT)
Dept: OUTPATIENT SERVICES | Facility: HOSPITAL | Age: 29
LOS: 1 days | Discharge: HOME | End: 2019-05-01

## 2019-05-01 VITALS — TEMPERATURE: 98 F

## 2019-05-01 VITALS — SYSTOLIC BLOOD PRESSURE: 116 MMHG | HEART RATE: 75 BPM | DIASTOLIC BLOOD PRESSURE: 67 MMHG

## 2019-05-01 NOTE — OB PROVIDER TRIAGE NOTE - NSHPPHYSICALEXAM_GEN_ALL_CORE
PHYSICAL EXAM:  T(F): 98.2 (05-01 @ 14:17)  HR: 75 (05-01 @ 14:16)  BP: 116/67 (05-01 @ 14:16)  RR: 18 (05-01 @ 14:16)  Constitutional: AAOx3, NAD  Abdomen: Soft, gravid, nontender, no palpable ctx

## 2019-05-01 NOTE — OB PROVIDER TRIAGE NOTE - PMH
<<----- Click to add NO pertinent Past Medical History No pertinent past medical history Ovarian cyst

## 2019-05-01 NOTE — OB PROVIDER TRIAGE NOTE - HISTORY OF PRESENT ILLNESS
The pt is a 29y y/o G [id number] @ 27.4 weeks  edc 7/28/19  dated by   who presents to labor & delivery c/o  low back pain with some pressure and occasional cramping that began today. Pt states has felt this before in the past 2 weeks. Pt reports no n/v/d/f/or chills no sick contacts at home.  Pt reports + fetal movement, no leaking last coitus >24 hours ago.  PT denies any urinary symptoms and states tries to drink at least one gallon of water per day.

## 2019-05-01 NOTE — OB RN TRIAGE NOTE - NS_TRIAGEADDITIONAL COMMENTS_OBGYN_ALL_OB_FT
Patient presents to labor and delivery c\o cramping and feeling of increased pressure in her vagina. Denies SROM or vaginal bleeding. Denies complications with current pregnancy. Denies dysuria, fever chills.

## 2019-05-01 NOTE — OB PROVIDER TRIAGE NOTE - NS_OBGYNHISTORY_OBGYN_ALL_OB_FT
X 1  TOP @ 21 weeks for fetal anomalies OB HX:   X 1  TOP @ 21 weeks for fetal anomalies    GYN HX:    h/o hpv 6 years ago s/p colpo  h/o ovarian cysts

## 2019-05-01 NOTE — OB PROVIDER TRIAGE NOTE - NSOBPROVIDERNOTE_OBGYN_ALL_OB_FT
A/P 30 y/o  @ 27.4 weeks + maternal/ fetal well being, not in  labor adequate CL  d/c to home with labor precautions, pt instructed to call PMD office if symptoms return  f/u next appointment as scheduled  continue to hydrate  Dr Lundberg aware

## 2019-05-09 ENCOUNTER — APPOINTMENT (OUTPATIENT)
Dept: OBGYN | Facility: CLINIC | Age: 29
End: 2019-05-09
Payer: MEDICAID

## 2019-05-09 ENCOUNTER — NON-APPOINTMENT (OUTPATIENT)
Age: 29
End: 2019-05-09

## 2019-05-09 VITALS — SYSTOLIC BLOOD PRESSURE: 100 MMHG | WEIGHT: 185 LBS | DIASTOLIC BLOOD PRESSURE: 60 MMHG | BODY MASS INDEX: 32.77 KG/M2

## 2019-05-09 PROCEDURE — 99213 OFFICE O/P EST LOW 20 MIN: CPT | Mod: TH

## 2019-05-09 PROCEDURE — 76815 OB US LIMITED FETUS(S): CPT

## 2019-05-20 ENCOUNTER — NON-APPOINTMENT (OUTPATIENT)
Age: 29
End: 2019-05-20

## 2019-05-20 ENCOUNTER — APPOINTMENT (OUTPATIENT)
Dept: OBGYN | Facility: CLINIC | Age: 29
End: 2019-05-20
Payer: MEDICAID

## 2019-05-20 VITALS — WEIGHT: 190 LBS | HEIGHT: 69 IN | BODY MASS INDEX: 28.14 KG/M2

## 2019-05-20 LAB
BILIRUB UR QL STRIP: NORMAL
CLARITY UR: CLEAR
GLUCOSE UR-MCNC: NORMAL
HCG UR QL: NORMAL EU/DL
HGB UR QL STRIP.AUTO: NORMAL
KETONES UR-MCNC: NORMAL
LEUKOCYTE ESTERASE UR QL STRIP: 25
NITRITE UR QL STRIP: NORMAL
PH UR STRIP: 9
PROT UR STRIP-MCNC: NORMAL
SP GR UR STRIP: 1.01

## 2019-05-20 PROCEDURE — 99213 OFFICE O/P EST LOW 20 MIN: CPT | Mod: TH

## 2019-06-06 ENCOUNTER — LABORATORY RESULT (OUTPATIENT)
Age: 29
End: 2019-06-06

## 2019-06-06 ENCOUNTER — OUTPATIENT (OUTPATIENT)
Dept: OUTPATIENT SERVICES | Facility: HOSPITAL | Age: 29
LOS: 1 days | Discharge: HOME | End: 2019-06-06

## 2019-06-06 ENCOUNTER — APPOINTMENT (OUTPATIENT)
Dept: OBGYN | Facility: CLINIC | Age: 29
End: 2019-06-06
Payer: MEDICAID

## 2019-06-06 ENCOUNTER — NON-APPOINTMENT (OUTPATIENT)
Age: 29
End: 2019-06-06

## 2019-06-06 DIAGNOSIS — Z34.00 ENCOUNTER FOR SUPERVISION OF NORMAL FIRST PREGNANCY, UNSPECIFIED TRIMESTER: ICD-10-CM

## 2019-06-06 PROCEDURE — 99213 OFFICE O/P EST LOW 20 MIN: CPT | Mod: TH

## 2019-06-06 PROCEDURE — 76819 FETAL BIOPHYS PROFIL W/O NST: CPT

## 2019-06-17 ENCOUNTER — APPOINTMENT (OUTPATIENT)
Dept: OBGYN | Facility: CLINIC | Age: 29
End: 2019-06-17
Payer: MEDICAID

## 2019-06-17 ENCOUNTER — OUTPATIENT (OUTPATIENT)
Dept: OUTPATIENT SERVICES | Facility: HOSPITAL | Age: 29
LOS: 1 days | Discharge: HOME | End: 2019-06-17

## 2019-06-17 ENCOUNTER — LABORATORY RESULT (OUTPATIENT)
Age: 29
End: 2019-06-17

## 2019-06-17 ENCOUNTER — NON-APPOINTMENT (OUTPATIENT)
Age: 29
End: 2019-06-17

## 2019-06-17 VITALS — BODY MASS INDEX: 28.73 KG/M2 | HEIGHT: 69 IN | WEIGHT: 194 LBS

## 2019-06-17 DIAGNOSIS — Z34.00 ENCOUNTER FOR SUPERVISION OF NORMAL FIRST PREGNANCY, UNSPECIFIED TRIMESTER: ICD-10-CM

## 2019-06-17 LAB
BILIRUB UR QL STRIP: NORMAL
CLARITY UR: CLEAR
GLUCOSE UR-MCNC: NORMAL
HCG UR QL: NORMAL EU/DL
HGB UR QL STRIP.AUTO: NORMAL
KETONES UR-MCNC: NORMAL
LEUKOCYTE ESTERASE UR QL STRIP: 500
NITRITE UR QL STRIP: NORMAL
PH UR STRIP: 7
PROT UR STRIP-MCNC: NORMAL
SP GR UR STRIP: 1.01

## 2019-06-17 PROCEDURE — 99213 OFFICE O/P EST LOW 20 MIN: CPT | Mod: TH

## 2019-06-23 ENCOUNTER — OUTPATIENT (OUTPATIENT)
Dept: OUTPATIENT SERVICES | Facility: HOSPITAL | Age: 29
LOS: 1 days | Discharge: HOME | End: 2019-06-23

## 2019-06-23 VITALS
HEART RATE: 87 BPM | DIASTOLIC BLOOD PRESSURE: 72 MMHG | SYSTOLIC BLOOD PRESSURE: 122 MMHG | TEMPERATURE: 99 F | RESPIRATION RATE: 18 BRPM

## 2019-06-23 NOTE — OB PROVIDER TRIAGE NOTE - NSHPLABSRESULTS_GEN_ALL_CORE
GTT 71/156/118/79    12/12/18   O pos   HbsAg NR; RPR NR; HIV NR  Rubella/varicella immune     6/17/19   GBS neg     6/6/19   HIV NR   Measles NON-IMMUNE     ega 32.5w- efw 4lb6oz; cephalic, MVP 7.2cm, BPP 8/8   ega 28.5w- efw [id number]; transverse, post placenta  ega 19.3w- efw 348g, variable presentation, 3vc, post placenta, MVP 6cm; normal anatomy

## 2019-06-23 NOTE — OB PROVIDER TRIAGE NOTE - HISTORY OF PRESENT ILLNESS
28yo  at 35w1d GA, by LMP c/w 1st trim sono, c/o LOF, clear at 1700; panty liner since then appeared wet; not saturated. Associated with irregular cramping, not painful. Denies VB. Good FM. Denies fever, chills, nausea/vomiting, diarrhea/constipation, dysuria, urgency, frequency. Elevated GCT, normal GTT this pregnancy. Last BM today. Denies other complications during this pregnancy. Last exam 1 week ago-1cm dilated

## 2019-06-23 NOTE — OB PROVIDER TRIAGE NOTE - NSOBPROVIDERNOTE_OBGYN_ALL_OB_FT
28yo  at 35w1d GA, GBS neg, with BPP 8/8, reassuring maternal and fetal status, not ruptured, not in  labor   - d/c home   - ambulation/PO hydration   -  labor precautions/FKC   - f/u next scheduled appt     Dr. Lundberg and Dr. Harley aware

## 2019-06-23 NOTE — OB PROVIDER TRIAGE NOTE - NSHPPHYSICALEXAM_GEN_ALL_CORE
Vital Signs Last 24 Hrs  T(C): 37.3 (23 Jun 2019 19:19), Max: 37.3 (23 Jun 2019 19:19)  T(F): 99.1 (23 Jun 2019 19:19), Max: 99.1 (23 Jun 2019 19:19)  HR: 87 (23 Jun 2019 19:19) (87 - 87)  BP: 122/72 (23 Jun 2019 19:19) (122/72 - 122/72)  RR: 18 (23 Jun 2019 19:19) (18 - 18)    Udip: neg     EFM: 150/mod/accels+  [place]: irreg ctx  Abd: soft, gravid, nontender, no palpable ctx  [place]: cervix appears normal, pooling/nitrazine/ferning neg   SVE: 1/50/-3, vtx by sono, intact

## 2019-06-23 NOTE — OB PROVIDER TRIAGE NOTE - NS_OBGYNHISTORY_OBGYN_ALL_OB_FT
ObHx: 16- FT  x1, M-7lb4oz at 39w GA; 18- etopX1 at 21w GA; IOL/ for multiple fetal anomalies on ultrasound     GynHx: h/o conservatively managed ovarian cysts; denies h/o fibroids, abnormal pap smears, STIs.

## 2019-06-24 ENCOUNTER — APPOINTMENT (OUTPATIENT)
Dept: OBGYN | Facility: CLINIC | Age: 29
End: 2019-06-24
Payer: MEDICAID

## 2019-06-24 ENCOUNTER — NON-APPOINTMENT (OUTPATIENT)
Age: 29
End: 2019-06-24

## 2019-06-24 VITALS — BODY MASS INDEX: 29.33 KG/M2 | HEIGHT: 69 IN | WEIGHT: 198 LBS

## 2019-06-24 LAB
BILIRUB UR QL STRIP: NORMAL
CLARITY UR: CLEAR
GLUCOSE UR-MCNC: NORMAL
HCG UR QL: NORMAL EU/DL
HGB UR QL STRIP.AUTO: NORMAL
KETONES UR-MCNC: NORMAL
LEUKOCYTE ESTERASE UR QL STRIP: 25
NITRITE UR QL STRIP: NORMAL
PH UR STRIP: 8
PROT UR STRIP-MCNC: NORMAL
SP GR UR STRIP: 1.01

## 2019-06-24 PROCEDURE — 99213 OFFICE O/P EST LOW 20 MIN: CPT | Mod: TH

## 2019-06-25 ENCOUNTER — RESULT REVIEW (OUTPATIENT)
Age: 29
End: 2019-06-25

## 2019-07-01 ENCOUNTER — NON-APPOINTMENT (OUTPATIENT)
Age: 29
End: 2019-07-01

## 2019-07-01 ENCOUNTER — APPOINTMENT (OUTPATIENT)
Dept: OBGYN | Facility: CLINIC | Age: 29
End: 2019-07-01
Payer: MEDICAID

## 2019-07-01 ENCOUNTER — OUTPATIENT (OUTPATIENT)
Dept: OUTPATIENT SERVICES | Facility: HOSPITAL | Age: 29
LOS: 1 days | End: 2019-07-01

## 2019-07-01 VITALS — BODY MASS INDEX: 29.18 KG/M2 | HEIGHT: 69 IN | WEIGHT: 197 LBS

## 2019-07-01 PROCEDURE — 99213 OFFICE O/P EST LOW 20 MIN: CPT | Mod: TH

## 2019-07-08 ENCOUNTER — NON-APPOINTMENT (OUTPATIENT)
Age: 29
End: 2019-07-08

## 2019-07-08 ENCOUNTER — APPOINTMENT (OUTPATIENT)
Dept: OBGYN | Facility: CLINIC | Age: 29
End: 2019-07-08
Payer: MEDICAID

## 2019-07-08 VITALS — BODY MASS INDEX: 29.33 KG/M2 | WEIGHT: 198 LBS | HEIGHT: 69 IN

## 2019-07-08 LAB
BILIRUB UR QL STRIP: NORMAL
CLARITY UR: CLEAR
GLUCOSE UR-MCNC: NORMAL
HCG UR QL: NORMAL EU/DL
HGB UR QL STRIP.AUTO: NORMAL
KETONES UR-MCNC: NORMAL
LEUKOCYTE ESTERASE UR QL STRIP: NORMAL
NITRITE UR QL STRIP: NORMAL
PH UR STRIP: 6
PROT UR STRIP-MCNC: NORMAL
SP GR UR STRIP: 1.02

## 2019-07-08 PROCEDURE — 99213 OFFICE O/P EST LOW 20 MIN: CPT | Mod: TH

## 2019-07-18 ENCOUNTER — NON-APPOINTMENT (OUTPATIENT)
Age: 29
End: 2019-07-18

## 2019-07-18 ENCOUNTER — APPOINTMENT (OUTPATIENT)
Dept: OBGYN | Facility: CLINIC | Age: 29
End: 2019-07-18
Payer: MEDICAID

## 2019-07-18 VITALS — BODY MASS INDEX: 29.83 KG/M2 | WEIGHT: 202 LBS

## 2019-07-18 LAB
BILIRUB UR QL STRIP: NORMAL
GLUCOSE UR-MCNC: NORMAL
HCG UR QL: NORMAL EU/DL
HGB UR QL STRIP.AUTO: NORMAL
KETONES UR-MCNC: NORMAL
LEUKOCYTE ESTERASE UR QL STRIP: NORMAL
NITRITE UR QL STRIP: NORMAL
PH UR STRIP: 7
PROT UR STRIP-MCNC: NORMAL
SP GR UR STRIP: 1

## 2019-07-18 PROCEDURE — 99213 OFFICE O/P EST LOW 20 MIN: CPT | Mod: TH

## 2019-07-21 ENCOUNTER — INPATIENT (INPATIENT)
Facility: HOSPITAL | Age: 29
LOS: 2 days | Discharge: HOME | End: 2019-07-24
Attending: OBSTETRICS & GYNECOLOGY | Admitting: OBSTETRICS & GYNECOLOGY
Payer: MEDICAID

## 2019-07-21 VITALS — HEART RATE: 90 BPM | DIASTOLIC BLOOD PRESSURE: 64 MMHG | SYSTOLIC BLOOD PRESSURE: 113 MMHG | TEMPERATURE: 98 F

## 2019-07-21 LAB
APPEARANCE UR: CLEAR — SIGNIFICANT CHANGE UP
BASOPHILS # BLD AUTO: 0.03 K/UL — SIGNIFICANT CHANGE UP (ref 0–0.2)
BASOPHILS NFR BLD AUTO: 0.2 % — SIGNIFICANT CHANGE UP (ref 0–1)
BILIRUB UR-MCNC: NEGATIVE — SIGNIFICANT CHANGE UP
BLD GP AB SCN SERPL QL: SIGNIFICANT CHANGE UP
COLOR SPEC: SIGNIFICANT CHANGE UP
DIFF PNL FLD: NEGATIVE — SIGNIFICANT CHANGE UP
EOSINOPHIL # BLD AUTO: 0.04 K/UL — SIGNIFICANT CHANGE UP (ref 0–0.7)
EOSINOPHIL NFR BLD AUTO: 0.3 % — SIGNIFICANT CHANGE UP (ref 0–8)
GLUCOSE UR QL: NEGATIVE — SIGNIFICANT CHANGE UP
HCT VFR BLD CALC: 35.8 % — LOW (ref 37–47)
HGB BLD-MCNC: 11.9 G/DL — LOW (ref 12–16)
IMM GRANULOCYTES NFR BLD AUTO: 0.9 % — HIGH (ref 0.1–0.3)
KETONES UR-MCNC: NEGATIVE — SIGNIFICANT CHANGE UP
LEUKOCYTE ESTERASE UR-ACNC: NEGATIVE — SIGNIFICANT CHANGE UP
LYMPHOCYTES # BLD AUTO: 17.4 % — LOW (ref 20.5–51.1)
LYMPHOCYTES # BLD AUTO: 2.29 K/UL — SIGNIFICANT CHANGE UP (ref 1.2–3.4)
MCHC RBC-ENTMCNC: 31.4 PG — HIGH (ref 27–31)
MCHC RBC-ENTMCNC: 33.2 G/DL — SIGNIFICANT CHANGE UP (ref 32–37)
MCV RBC AUTO: 94.5 FL — SIGNIFICANT CHANGE UP (ref 81–99)
MONOCYTES # BLD AUTO: 0.79 K/UL — HIGH (ref 0.1–0.6)
MONOCYTES NFR BLD AUTO: 6 % — SIGNIFICANT CHANGE UP (ref 1.7–9.3)
NEUTROPHILS # BLD AUTO: 9.87 K/UL — HIGH (ref 1.4–6.5)
NEUTROPHILS NFR BLD AUTO: 75.2 % — SIGNIFICANT CHANGE UP (ref 42.2–75.2)
NITRITE UR-MCNC: NEGATIVE — SIGNIFICANT CHANGE UP
NRBC # BLD: 0 /100 WBCS — SIGNIFICANT CHANGE UP (ref 0–0)
PH UR: 7.5 — SIGNIFICANT CHANGE UP (ref 5–8)
PLATELET # BLD AUTO: 154 K/UL — SIGNIFICANT CHANGE UP (ref 130–400)
PRENATAL SYPHILIS TEST: SIGNIFICANT CHANGE UP
PROT UR-MCNC: SIGNIFICANT CHANGE UP
RBC # BLD: 3.79 M/UL — LOW (ref 4.2–5.4)
RBC # FLD: 12.6 % — SIGNIFICANT CHANGE UP (ref 11.5–14.5)
SP GR SPEC: 1.01 — LOW (ref 1.01–1.02)
UROBILINOGEN FLD QL: SIGNIFICANT CHANGE UP
WBC # BLD: 13.14 K/UL — HIGH (ref 4.8–10.8)
WBC # FLD AUTO: 13.14 K/UL — HIGH (ref 4.8–10.8)

## 2019-07-21 PROCEDURE — S2140: CPT

## 2019-07-21 PROCEDURE — 59409 OBSTETRICAL CARE: CPT | Mod: U7

## 2019-07-21 RX ORDER — OXYTOCIN 10 UNIT/ML
333.33 VIAL (ML) INJECTION
Qty: 20 | Refills: 0 | Status: DISCONTINUED | OUTPATIENT
Start: 2019-07-21 | End: 2019-07-22

## 2019-07-21 RX ORDER — SODIUM CHLORIDE 9 MG/ML
1000 INJECTION, SOLUTION INTRAVENOUS
Refills: 0 | Status: DISCONTINUED | OUTPATIENT
Start: 2019-07-21 | End: 2019-07-21

## 2019-07-21 RX ORDER — SODIUM CHLORIDE 9 MG/ML
1000 INJECTION, SOLUTION INTRAVENOUS
Refills: 0 | Status: DISCONTINUED | OUTPATIENT
Start: 2019-07-21 | End: 2019-07-22

## 2019-07-21 RX ORDER — OXYTOCIN 10 UNIT/ML
2 VIAL (ML) INJECTION
Qty: 30 | Refills: 0 | Status: DISCONTINUED | OUTPATIENT
Start: 2019-07-21 | End: 2019-07-22

## 2019-07-21 NOTE — OB PROVIDER H&P - NSHPPHYSICALEXAM_GEN_ALL_CORE
Vital Signs Last 24 Hrs  T(F): 98.06 (21 Jul 2019 18:40), Max: 98.06 (21 Jul 2019 18:40)  HR: 90 (21 Jul 2019 18:40) (90 - 90)  BP: 113/64 (21 Jul 2019 18:40) (113/64 - 113/64)  RR: 14    EFM: 140/mod/accels  [place]: q1-2min    Gen: AAOx3, NAD  Abd: Gravid, soft, nontender, no palpable contractions Vital Signs Last 24 Hrs  T(F): 98.06 (21 Jul 2019 18:40), Max: 98.06 (21 Jul 2019 18:40)  HR: 90 (21 Jul 2019 18:40) (90 - 90)  BP: 113/64 (21 Jul 2019 18:40) (113/64 - 113/64)  RR: 14    EFM: 140/mod/accels  [place]: irregular    Gen: AAOx3, NAD  Abd: Gravid, soft, nontender, no palpable contractions

## 2019-07-21 NOTE — OB PROVIDER H&P - HISTORY OF PRESENT ILLNESS
30 yo  at 39w1d GA by LMP consistent with first trimester sono presents for scheduled induction of labor. She feels occasional contractions. She denies leakage of fluid or vaginal bleeding. She feels regular fetal movement. Last pregnancy complicated by skeletal dysplasia, normal genetic screening this pregnancy. 28 yo  at 39w1d GA by LMP consistent with first trimester sono presents for scheduled induction of labor. She feels occasional contractions. She denies leakage of fluid or vaginal bleeding. She feels regular fetal movement. Last pregnancy complicated by 21 week termination for skeletal dysplasia, normal genetic screening this pregnancy.

## 2019-07-21 NOTE — OB PROVIDER H&P - ASSESSMENT
28 yo  at 39w1d GA, GBS negative, positive GCT negative GTT, h/o previous pregnancy with skeletal dysplase with normal anatomy scan this pregnancy, NIPT low risk male, sequential screen low risk, for induction of labor  Admit to L&D  Continuous EFM and toco  IV fluids  Admission labs  Clear liquid diet  Pain management PRN    Discussed with 28 yo  at 39w1d GA, GBS negative, positive GCT negative GTT, h/o previous pregnancy with skeletal dysplasia with normal anatomy scan this pregnancy, NIPT low risk male, sequential screen low risk, for induction of labor    Admit to L&D  Continuous EFM and toco  IV fluids  Admission labs  Clear liquid diet  Pain management PRN  Pitocin for induction    Discussed with Dr. Lundberg

## 2019-07-21 NOTE — OB PROVIDER H&P - NSHPLABSRESULTS_GEN_ALL_CORE
Sonos:  32w5d BPP 8/8, MVP 7.2cm, 4lb6oz, vertex  28w5d transverse, posterior palcenta, amniotic fluid WNL, "long bones all measure adequately"  19w3d 348g, variable presentation, 3 vessel cord, posterior placenta, MVP 60mm, normal anatomic survey  7w4d SIUP consistent with dates

## 2019-07-21 NOTE — OB PROVIDER H&P - NS_OBGYNHISTORY_OBGYN_ALL_OB_FT
OB: , FT  x1 uncomplicated; vaginal delivery of infant with skeletal dysplasia.  GYN: Denies history of fibroids, cysts, abnormal paps, or STIs. OB: , FT  x1 uncomplicated; 21 week medically induced termination of pregnancy delivered vaginally for skeletal dysplasia.  GYN: Denies history of fibroids, cysts, abnormal paps, or STIs.

## 2019-07-21 NOTE — PROGRESS NOTE ADULT - SUBJECTIVE AND OBJECTIVE BOX
PGY1 Note    Patient seen at bedside. No complaints at the moment.    T(F): 98.24 ( @ 21:31), Max: 98.24 ( @ 21:31)  HR: 70 ( @ 22:18)  BP: 103/56 ( @ 22:16) (90/46 - 127/66)  RR: 18 ( @ 18:43)    EFM: 140bpm/moderate variability/+accels; Cat 1 tracing  TOCO: q3-4mins  SVE: 370/-1, AROM, clear per Dr. Lundberg    Medications:  pitocin at 10u      Labs:                        11.9   13.14 )-----------( 154      ( 2019 20:20 )             35.8           Prenatal Syphilis Test: Nonreact ( @ 20:20)  Antibody Screen: NEG (19 @ 20:20)    Urinalysis Basic - ( 2019 19:30 )    Color: Light Yellow / Appearance: Clear / S.008 / pH: x  Gluc: x / Ketone: Negative  / Bili: Negative / Urobili: <2 mg/dL   Blood: x / Protein: Trace / Nitrite: Negative   Leuk Esterase: Negative / RBC: x / WBC x   Sq Epi: x / Non Sq Epi: x / Bacteria: x        A/P:   30 yo  at 39w1d GA, Rh positive, GBS negative, h/o previous pregnancy with skeletal dysplasia with normal anatomy scan this pregnancy, NIPT low risk male, sequential screen low risk,, s/p epidural, on pitocin, in labor progressing well.  -Continue current management   -Pain management prn  -Continuous EFM/toco  -F/u pending labs  -Reevaluate     Dr. Mack aware and Dr. Lundberg to be made aware. PGY1 Note    Patient seen at bedside. No complaints at the moment.    T(F): 98.24 ( @ 21:31), Max: 98.24 ( @ 21:31)  HR: 70 ( @ 22:18)  BP: 103/56 ( @ 22:16) (90/46 - 127/66)  RR: 18 ( @ 18:43)    EFM: 140bpm/moderate variability/+accels; Cat 1 tracing  TOCO: q3-4mins  SVE: 3/70/-1, AROM, clear per Dr. Lundberg    Medications:  pitocin at 10u      Labs:                        11.9   13.14 )-----------( 154      ( 2019 20:20 )             35.8           Prenatal Syphilis Test: Nonreact ( @ 20:20)  Antibody Screen: NEG (19 @ 20:20)    Urinalysis Basic - ( 2019 19:30 )    Color: Light Yellow / Appearance: Clear / S.008 / pH: x  Gluc: x / Ketone: Negative  / Bili: Negative / Urobili: <2 mg/dL   Blood: x / Protein: Trace / Nitrite: Negative   Leuk Esterase: Negative / RBC: x / WBC x   Sq Epi: x / Non Sq Epi: x / Bacteria: x

## 2019-07-22 LAB
AMPHET UR-MCNC: NEGATIVE — SIGNIFICANT CHANGE UP
BARBITURATES UR SCN-MCNC: NEGATIVE — SIGNIFICANT CHANGE UP
BASOPHILS # BLD AUTO: 0.03 K/UL — SIGNIFICANT CHANGE UP (ref 0–0.2)
BASOPHILS NFR BLD AUTO: 0.2 % — SIGNIFICANT CHANGE UP (ref 0–1)
BENZODIAZ UR-MCNC: NEGATIVE — SIGNIFICANT CHANGE UP
BUPRENORPHINE SCREEN, URINE RESULT: NEGATIVE — SIGNIFICANT CHANGE UP
COCAINE METAB.OTHER UR-MCNC: NEGATIVE — SIGNIFICANT CHANGE UP
EOSINOPHIL # BLD AUTO: 0.12 K/UL — SIGNIFICANT CHANGE UP (ref 0–0.7)
EOSINOPHIL NFR BLD AUTO: 0.9 % — SIGNIFICANT CHANGE UP (ref 0–8)
HCT VFR BLD CALC: 33.2 % — LOW (ref 37–47)
HGB BLD-MCNC: 10.9 G/DL — LOW (ref 12–16)
IMM GRANULOCYTES NFR BLD AUTO: 0.8 % — HIGH (ref 0.1–0.3)
L&D DRUG SCREEN, URINE: SIGNIFICANT CHANGE UP
LYMPHOCYTES # BLD AUTO: 2.76 K/UL — SIGNIFICANT CHANGE UP (ref 1.2–3.4)
LYMPHOCYTES # BLD AUTO: 20.7 % — SIGNIFICANT CHANGE UP (ref 20.5–51.1)
MCHC RBC-ENTMCNC: 31.7 PG — HIGH (ref 27–31)
MCHC RBC-ENTMCNC: 32.8 G/DL — SIGNIFICANT CHANGE UP (ref 32–37)
MCV RBC AUTO: 96.5 FL — SIGNIFICANT CHANGE UP (ref 81–99)
METHADONE UR-MCNC: NEGATIVE — SIGNIFICANT CHANGE UP
MONOCYTES # BLD AUTO: 0.86 K/UL — HIGH (ref 0.1–0.6)
MONOCYTES NFR BLD AUTO: 6.5 % — SIGNIFICANT CHANGE UP (ref 1.7–9.3)
NEUTROPHILS # BLD AUTO: 9.43 K/UL — HIGH (ref 1.4–6.5)
NEUTROPHILS NFR BLD AUTO: 70.9 % — SIGNIFICANT CHANGE UP (ref 42.2–75.2)
NRBC # BLD: 0 /100 WBCS — SIGNIFICANT CHANGE UP (ref 0–0)
OPIATES UR-MCNC: NEGATIVE — SIGNIFICANT CHANGE UP
OXYCODONE UR-MCNC: NEGATIVE — SIGNIFICANT CHANGE UP
PCP UR-MCNC: NEGATIVE — SIGNIFICANT CHANGE UP
PLATELET # BLD AUTO: 135 K/UL — SIGNIFICANT CHANGE UP (ref 130–400)
PROPOXYPHENE QUALITATIVE URINE RESULT: NEGATIVE — SIGNIFICANT CHANGE UP
RBC # BLD: 3.44 M/UL — LOW (ref 4.2–5.4)
RBC # FLD: 12.6 % — SIGNIFICANT CHANGE UP (ref 11.5–14.5)
WBC # BLD: 13.31 K/UL — HIGH (ref 4.8–10.8)
WBC # FLD AUTO: 13.31 K/UL — HIGH (ref 4.8–10.8)

## 2019-07-22 RX ORDER — GLYCERIN ADULT
1 SUPPOSITORY, RECTAL RECTAL AT BEDTIME
Refills: 0 | Status: DISCONTINUED | OUTPATIENT
Start: 2019-07-22 | End: 2019-07-24

## 2019-07-22 RX ORDER — SIMETHICONE 80 MG/1
80 TABLET, CHEWABLE ORAL EVERY 4 HOURS
Refills: 0 | Status: DISCONTINUED | OUTPATIENT
Start: 2019-07-22 | End: 2019-07-24

## 2019-07-22 RX ORDER — LANOLIN
1 OINTMENT (GRAM) TOPICAL EVERY 6 HOURS
Refills: 0 | Status: DISCONTINUED | OUTPATIENT
Start: 2019-07-22 | End: 2019-07-24

## 2019-07-22 RX ORDER — OXYCODONE HYDROCHLORIDE 5 MG/1
5 TABLET ORAL
Refills: 0 | Status: DISCONTINUED | OUTPATIENT
Start: 2019-07-22 | End: 2019-07-24

## 2019-07-22 RX ORDER — AER TRAVELER 0.5 G/1
1 SOLUTION RECTAL; TOPICAL EVERY 4 HOURS
Refills: 0 | Status: DISCONTINUED | OUTPATIENT
Start: 2019-07-22 | End: 2019-07-24

## 2019-07-22 RX ORDER — DIPHENHYDRAMINE HCL 50 MG
25 CAPSULE ORAL EVERY 6 HOURS
Refills: 0 | Status: DISCONTINUED | OUTPATIENT
Start: 2019-07-22 | End: 2019-07-24

## 2019-07-22 RX ORDER — DOCUSATE SODIUM 100 MG
100 CAPSULE ORAL
Refills: 0 | Status: DISCONTINUED | OUTPATIENT
Start: 2019-07-22 | End: 2019-07-24

## 2019-07-22 RX ORDER — IBUPROFEN 200 MG
600 TABLET ORAL EVERY 6 HOURS
Refills: 0 | Status: DISCONTINUED | OUTPATIENT
Start: 2019-07-22 | End: 2019-07-24

## 2019-07-22 RX ORDER — OXYCODONE HYDROCHLORIDE 5 MG/1
5 TABLET ORAL ONCE
Refills: 0 | Status: DISCONTINUED | OUTPATIENT
Start: 2019-07-22 | End: 2019-07-24

## 2019-07-22 RX ORDER — MAGNESIUM HYDROXIDE 400 MG/1
30 TABLET, CHEWABLE ORAL
Refills: 0 | Status: DISCONTINUED | OUTPATIENT
Start: 2019-07-22 | End: 2019-07-24

## 2019-07-22 RX ORDER — KETOROLAC TROMETHAMINE 30 MG/ML
30 SYRINGE (ML) INJECTION ONCE
Refills: 0 | Status: DISCONTINUED | OUTPATIENT
Start: 2019-07-22 | End: 2019-07-22

## 2019-07-22 RX ORDER — BENZOCAINE 10 %
1 GEL (GRAM) MUCOUS MEMBRANE EVERY 6 HOURS
Refills: 0 | Status: DISCONTINUED | OUTPATIENT
Start: 2019-07-22 | End: 2019-07-24

## 2019-07-22 RX ORDER — HYDROCORTISONE 1 %
1 OINTMENT (GRAM) TOPICAL EVERY 6 HOURS
Refills: 0 | Status: DISCONTINUED | OUTPATIENT
Start: 2019-07-22 | End: 2019-07-24

## 2019-07-22 RX ORDER — DIBUCAINE 1 %
1 OINTMENT (GRAM) RECTAL EVERY 6 HOURS
Refills: 0 | Status: DISCONTINUED | OUTPATIENT
Start: 2019-07-22 | End: 2019-07-24

## 2019-07-22 RX ORDER — ACETAMINOPHEN 500 MG
975 TABLET ORAL
Refills: 0 | Status: DISCONTINUED | OUTPATIENT
Start: 2019-07-22 | End: 2019-07-24

## 2019-07-22 RX ORDER — IBUPROFEN 200 MG
600 TABLET ORAL EVERY 6 HOURS
Refills: 0 | Status: COMPLETED | OUTPATIENT
Start: 2019-07-22 | End: 2020-06-19

## 2019-07-22 RX ORDER — OXYTOCIN 10 UNIT/ML
333.33 VIAL (ML) INJECTION
Qty: 20 | Refills: 0 | Status: DISCONTINUED | OUTPATIENT
Start: 2019-07-22 | End: 2019-07-24

## 2019-07-22 RX ORDER — SODIUM CHLORIDE 9 MG/ML
3 INJECTION INTRAMUSCULAR; INTRAVENOUS; SUBCUTANEOUS EVERY 8 HOURS
Refills: 0 | Status: DISCONTINUED | OUTPATIENT
Start: 2019-07-22 | End: 2019-07-24

## 2019-07-22 RX ADMIN — Medication 600 MILLIGRAM(S): at 23:59

## 2019-07-22 RX ADMIN — Medication 975 MILLIGRAM(S): at 09:00

## 2019-07-22 RX ADMIN — SODIUM CHLORIDE 3 MILLILITER(S): 9 INJECTION INTRAMUSCULAR; INTRAVENOUS; SUBCUTANEOUS at 13:36

## 2019-07-22 RX ADMIN — SODIUM CHLORIDE 3 MILLILITER(S): 9 INJECTION INTRAMUSCULAR; INTRAVENOUS; SUBCUTANEOUS at 05:09

## 2019-07-22 RX ADMIN — Medication 975 MILLIGRAM(S): at 08:22

## 2019-07-22 RX ADMIN — Medication 600 MILLIGRAM(S): at 18:05

## 2019-07-22 RX ADMIN — SODIUM CHLORIDE 3 MILLILITER(S): 9 INJECTION INTRAMUSCULAR; INTRAVENOUS; SUBCUTANEOUS at 21:12

## 2019-07-22 RX ADMIN — Medication 975 MILLIGRAM(S): at 15:41

## 2019-07-22 RX ADMIN — Medication 600 MILLIGRAM(S): at 13:23

## 2019-07-22 RX ADMIN — Medication 30 MILLIGRAM(S): at 02:45

## 2019-07-22 RX ADMIN — Medication 975 MILLIGRAM(S): at 21:13

## 2019-07-22 RX ADMIN — Medication 1 TABLET(S): at 08:22

## 2019-07-22 NOTE — PROGRESS NOTE ADULT - SUBJECTIVE AND OBJECTIVE BOX
PGY1 Note    Patient seen at bedside. No complaints at the moment.    T(F): 97.88 ( @ 01:36), Max: 98.24 ( @ 21:31)  HR: 73 ( @ 01:58)  BP: 84/47 ( @ 01:49) (78/40 - 127/66)  RR: 18 ( @ 18:43)    EFM: 135bpm/moderate variability/+accels; Cat 1 tracing  TOCO:q2mins  SVE: 5.6/80/-1 per Dr. Lundberg    Medications:  on pitocin      Labs:                        11.9   13.14 )-----------( 154      ( 2019 20:20 )             35.8         Prenatal Syphilis Test: Nonreact ( @ 20:20)  Antibody Screen: NEG (19 @ 20:20)    Urinalysis Basic - ( 2019 19:30 )    Color: Light Yellow / Appearance: Clear / S.008 / pH: x  Gluc: x / Ketone: Negative  / Bili: Negative / Urobili: <2 mg/dL   Blood: x / Protein: Trace / Nitrite: Negative   Leuk Esterase: Negative / RBC: x / WBC x   Sq Epi: x / Non Sq Epi: x / Bacteria: x PGY1 Note    Patient seen at bedside. No complaints at the moment.    T(F): 97.88 ( @ 01:36), Max: 98.24 ( @ 21:31)  HR: 73 ( @ 01:58)  BP: 84/47 ( @ 01:49) (78/40 - 127/66)  RR: 18 ( @ 18:43)    EFM: 135bpm/moderate variability/+accels; Cat 1 tracing  TOCO:q2mins  SVE: /0 per Dr. Ludnberg    Medications:  on pitocin      Labs:                        11.9   13.14 )-----------( 154      ( 2019 20:20 )             35.8         Prenatal Syphilis Test: Nonreact ( @ 20:20)  Antibody Screen: NEG (19 @ 20:20)    Urinalysis Basic - ( 2019 19:30 )    Color: Light Yellow / Appearance: Clear / S.008 / pH: x  Gluc: x / Ketone: Negative  / Bili: Negative / Urobili: <2 mg/dL   Blood: x / Protein: Trace / Nitrite: Negative   Leuk Esterase: Negative / RBC: x / WBC x   Sq Epi: x / Non Sq Epi: x / Bacteria: x

## 2019-07-22 NOTE — PROGRESS NOTE ADULT - ASSESSMENT
A/P:   30 yo  at 39w1d GA, Rh positive, GBS negative, h/o previous pregnancy with skeletal dysplasia with normal anatomy scan this pregnancy, NIPT low risk male, sequential screen low risk,, s/p epidural, on pitocin, in labor progressing well.  -Continue current management   -Pain management prn  -Continuous EFM/toco  -F/u pending labs  -Reevaluate     Dr. Mack aware and Dr. Lundberg to be made aware.
A/P:   30 yo  at 39w1d GA, Rh positive, GBS negative, h/o previous pregnancy with skeletal dysplasia with normal anatomy scan this pregnancy, NIPT low risk male, sequential screen low risk,, s/p epidural, on pitocin, in labor progressing well.  -Continue current management   -Pain management prn  -Continuous EFM/toco  -F/u pending labs  -Reevaluate     Dr. Mack aware and Dr. Lundberg to be made aware.

## 2019-07-22 NOTE — OB PROVIDER DELIVERY SUMMARY - NSPROVIDERDELIVERYNOTE_OBGYN_ALL_OB_FT
In birthing room.l Atraumatic  viable baby boy over intact perineum. Baby suctioned on perineum and after delivery of body, cord clamped x 2 and cut and baby handed to nurse an to patient.  cord bloods taken for gases and routine and for stem cells and tissue as per patient request.  Placenta delivered spontaneously and appears intact. cervix,vagina and perineum checked. tiny 1st degree laceration of vagina and perineum noted and repaired with single 2-0 chromic suture. uterus contacted well and fundus firm.  good hemostasis. apgars 9-9. weight 7'8"

## 2019-07-23 DIAGNOSIS — Z71.89 OTHER SPECIFIED COUNSELING: ICD-10-CM

## 2019-07-23 PROBLEM — N83.209 UNSPECIFIED OVARIAN CYST, UNSPECIFIED SIDE: Chronic | Status: INACTIVE | Noted: 2019-05-01 | Resolved: 2019-07-21

## 2019-07-23 RX ADMIN — Medication 100 MILLIGRAM(S): at 21:00

## 2019-07-23 RX ADMIN — Medication 600 MILLIGRAM(S): at 11:45

## 2019-07-23 RX ADMIN — MAGNESIUM HYDROXIDE 30 MILLILITER(S): 400 TABLET, CHEWABLE ORAL at 21:12

## 2019-07-23 RX ADMIN — SODIUM CHLORIDE 3 MILLILITER(S): 9 INJECTION INTRAMUSCULAR; INTRAVENOUS; SUBCUTANEOUS at 06:25

## 2019-07-23 RX ADMIN — Medication 975 MILLIGRAM(S): at 20:58

## 2019-07-23 RX ADMIN — Medication 600 MILLIGRAM(S): at 17:32

## 2019-07-23 RX ADMIN — Medication 1 TABLET(S): at 11:45

## 2019-07-23 RX ADMIN — Medication 975 MILLIGRAM(S): at 08:31

## 2019-07-23 RX ADMIN — SODIUM CHLORIDE 3 MILLILITER(S): 9 INJECTION INTRAMUSCULAR; INTRAVENOUS; SUBCUTANEOUS at 14:55

## 2019-07-23 RX ADMIN — Medication 600 MILLIGRAM(S): at 06:26

## 2019-07-23 RX ADMIN — Medication 600 MILLIGRAM(S): at 23:17

## 2019-07-23 RX ADMIN — Medication 975 MILLIGRAM(S): at 14:54

## 2019-07-23 RX ADMIN — Medication 1 APPLICATION(S): at 09:05

## 2019-07-23 NOTE — PROGRESS NOTE ADULT - SUBJECTIVE AND OBJECTIVE BOX
Status post  baby boy day #1  Afebrile and vs stable  No complaints at present.  Ambulatory, voiding without complaints, tolerating diet, nursing without complaints.  Pe: abdomen soft, uterine fundus firm.        lochia light        no calf tenderness  imp: status post  doing well  plan: probable discharge tomorrow.

## 2019-07-24 ENCOUNTER — TRANSCRIPTION ENCOUNTER (OUTPATIENT)
Age: 29
End: 2019-07-24

## 2019-07-24 VITALS
TEMPERATURE: 97 F | HEART RATE: 74 BPM | DIASTOLIC BLOOD PRESSURE: 69 MMHG | RESPIRATION RATE: 20 BRPM | SYSTOLIC BLOOD PRESSURE: 104 MMHG

## 2019-07-24 RX ORDER — ACETAMINOPHEN 500 MG
3 TABLET ORAL
Qty: 0 | Refills: 0 | DISCHARGE
Start: 2019-07-24

## 2019-07-24 RX ORDER — DOCUSATE SODIUM 100 MG
1 CAPSULE ORAL
Qty: 0 | Refills: 0 | DISCHARGE
Start: 2019-07-24

## 2019-07-24 RX ORDER — IBUPROFEN 200 MG
1 TABLET ORAL
Qty: 0 | Refills: 0 | DISCHARGE
Start: 2019-07-24

## 2019-07-24 RX ADMIN — Medication 0.5 MILLILITER(S): at 10:53

## 2019-07-24 RX ADMIN — Medication 975 MILLIGRAM(S): at 02:51

## 2019-07-24 RX ADMIN — Medication 975 MILLIGRAM(S): at 09:40

## 2019-07-24 NOTE — DISCHARGE NOTE OB - HOSPITAL COURSE
07-24-19 @ 10:42    HPI:  30 yo  at 39w1d GA by LMP consistent with first trimester sono presents for scheduled induction of labor. She feels occasional contractions. She denies leakage of fluid or vaginal bleeding. She feels regular fetal movement. Last pregnancy complicated by 21 week termination for skeletal dysplasia, normal genetic screening this pregnancy. (2019 18:35)      PAST MEDICAL & SURGICAL HISTORY:  No pertinent past medical history  No significant past surgical history      POST PARTUM COURSE:   uncomplicated labor and postpartum course discharged PPD2       LABS:                        10.9   13.31 )-----------( 135      ( 2019 19:21 )             33.2                   Allergies    No Known Allergies    Intolerances

## 2019-07-24 NOTE — DISCHARGE NOTE OB - CARE PROVIDER_API CALL
Gregory Lundberg)  Obstetrics and Gynecology  [address] [place] [address]  Phone: [phone]  Fax: [phone]  Follow Up Time:

## 2019-07-24 NOTE — DISCHARGE NOTE OB - MEDICATION SUMMARY - MEDICATIONS TO TAKE
I will START or STAY ON the medications listed below when I get home from the hospital:    acetaminophen 325 mg oral tablet  -- 3 tab(s) by mouth , As Needed  -- Indication: For pain    ibuprofen 600 mg oral tablet  -- 1 tab(s) by mouth every 6 hours, As Needed  -- Indication: For pain    docusate sodium 100 mg oral capsule  -- 1 cap(s) by mouth 2 times a day, As needed, For stool softening  -- Indication: For constipation

## 2019-07-24 NOTE — PROGRESS NOTE ADULT - SUBJECTIVE AND OBJECTIVE BOX
Status post  day #2  Afebrile and vs stable  Doing well.  No complaints.  Tolerating diet, ambulatory, voiding without complaints.  Nursing without complaints.  Using sitz bath, aurora bottle, witch hazel pads  No complaints of heavy bleeding; does have after birth cramping    Imp: status post  doing very well  Plan: Discharge home; all instructions given; ibuprofen for pain          return to office 5-6 wks or prn

## 2019-07-25 ENCOUNTER — APPOINTMENT (OUTPATIENT)
Dept: OBGYN | Facility: CLINIC | Age: 29
End: 2019-07-25

## 2019-07-29 DIAGNOSIS — Z3A.39 39 WEEKS GESTATION OF PREGNANCY: ICD-10-CM

## 2019-07-29 DIAGNOSIS — Z87.898 PERSONAL HISTORY OF OTHER SPECIFIED CONDITIONS: ICD-10-CM

## 2019-07-29 DIAGNOSIS — Z23 ENCOUNTER FOR IMMUNIZATION: ICD-10-CM

## 2019-09-03 ENCOUNTER — APPOINTMENT (OUTPATIENT)
Dept: OBGYN | Facility: CLINIC | Age: 29
End: 2019-09-03
Payer: MEDICAID

## 2019-09-03 VITALS — BODY MASS INDEX: 26.66 KG/M2 | WEIGHT: 180 LBS | HEIGHT: 69 IN

## 2019-09-03 LAB
BILIRUB UR QL STRIP: NORMAL
CLARITY UR: CLEAR
GLUCOSE UR-MCNC: NORMAL
HCG UR QL: NORMAL EU/DL
HGB UR QL STRIP.AUTO: NORMAL
KETONES UR-MCNC: NORMAL
LEUKOCYTE ESTERASE UR QL STRIP: NORMAL
NITRITE UR QL STRIP: NORMAL
PH UR STRIP: 6
PROT UR STRIP-MCNC: NORMAL
SP GR UR STRIP: 1.01

## 2019-09-03 PROCEDURE — 99213 OFFICE O/P EST LOW 20 MIN: CPT

## 2019-12-12 ENCOUNTER — APPOINTMENT (OUTPATIENT)
Dept: OBGYN | Facility: CLINIC | Age: 29
End: 2019-12-12

## 2020-01-08 ENCOUNTER — APPOINTMENT (OUTPATIENT)
Dept: OBGYN | Facility: CLINIC | Age: 30
End: 2020-01-08
Payer: MEDICAID

## 2020-01-08 VITALS — DIASTOLIC BLOOD PRESSURE: 70 MMHG | WEIGHT: 176 LBS | SYSTOLIC BLOOD PRESSURE: 110 MMHG | BODY MASS INDEX: 25.99 KG/M2

## 2020-01-08 DIAGNOSIS — O09.291 SUPERVISION OF PREGNANCY WITH OTHER POOR REPRODUCTIVE OR OBSTETRIC HISTORY, FIRST TRIMESTER: ICD-10-CM

## 2020-01-08 PROCEDURE — 99395 PREV VISIT EST AGE 18-39: CPT

## 2020-01-08 PROCEDURE — 81003 URINALYSIS AUTO W/O SCOPE: CPT | Mod: QW

## 2020-01-08 RX ORDER — ONDANSETRON 4 MG/1
4 TABLET ORAL EVERY 8 HOURS
Qty: 60 | Refills: 2 | Status: COMPLETED | COMMUNITY
Start: 2018-03-02 | End: 2020-01-08

## 2020-01-08 RX ORDER — NORETHINDRONE 0.35 MG/1
0.35 TABLET ORAL
Qty: 90 | Refills: 1 | Status: DISCONTINUED | COMMUNITY
Start: 2019-09-03 | End: 2020-01-08

## 2020-01-08 RX ORDER — NORETHINDRONE 0.35 MG/1
0.35 TABLET ORAL
Qty: 90 | Refills: 0 | Status: DISCONTINUED | COMMUNITY
Start: 2019-12-02 | End: 2020-01-08

## 2020-01-08 RX ORDER — DOXYLAMINE SUCCINATE AND PYRIDOXINE HYDROCHLORIDE 10; 10 MG/1; MG/1
10-10 TABLET, DELAYED RELEASE ORAL
Qty: 60 | Refills: 1 | Status: COMPLETED | COMMUNITY
Start: 2018-02-26 | End: 2020-01-08

## 2020-01-08 NOTE — COUNSELING
[facility]    Progress Note    Riley Blackwood Patient Status:  Inpatient    1938 MRN [id number]   Location [facility] 3W/SW Attending Douglas Flores MD   Hosp Day # 3 PCP Giselle Cherry MD       Subjective:    Riley Blackwood i [Breast Self Exam] : breast self exam WBC 15.1* 13.2* 11.8*   .0 240.0 246.0       Recent Labs   Lab 10/28/19  0531 10/29/19  0549 10/30/19  0541   * 118* 157*   BUN 36* 40* 46*   CREATSERUM 1.70* 1.86* 1.90*   GFRAA 32* 29* 28*   GFRNAA 28* 25* 24*   CA 8.5 8.7 8.8   ALB 2.7* [Nutrition] : nutrition 09/23/2014 47 (L) >60 Final     Comment:     Chronic Kidney Disease: GFR <60 ml/min/1.73 m2  Kidney failure: GFR <15 ml/min/1.73 m2  The accuracy of the MDRD equation is not suitable  for acute renal failure patients and it is not  recommended for use wi [Exercise] : exercise Final   10/03/2018 6.7 4.0 - 11.0 K/UL Final   09/30/2018 6.3 4.0 - 11.0 K/UL Final   06/25/2018 6.8 4.0 - 11.0 K/UL Final     WHITE BLOOD COUNT (L)   Date Value Ref Range Status   12/27/2014 7.4 4.0 - 11.0 K/UL Final   12/17/2014 8.3 4.0 - 11.0 K/UL Final [Contraception] : contraception 10/07/2014 188 140 - 400 K/UL Final   06/10/2014 175 140 - 400 K/UL Final   04/08/2014 198 140 - 400 K/UL Final       Scheduled Meds:   • [START ON 10/31/2019] iron sucrose  200 mg Intravenous Once   • allopurinol  100 mg Oral Daily   • iron sucrose  200

## 2020-01-08 NOTE — PHYSICAL EXAM
[Awake] : awake [Alert] : alert [Acute Distress] : no acute distress [LAD] : no lymphadenopathy [Thyroid Nodule] : no thyroid nodule [Goiter] : no goiter [Mass] : no breast mass [Nipple Discharge] : no nipple discharge [Axillary LAD] : no axillary lymphadenopathy [Soft] : soft [Tender] : non tender [Distended] : not distended [Oriented x3] : oriented to person, place, and time [Depressed Mood] : not depressed [Normal] : cervix [Pap Obtained] : a Pap smear was performed [Uterine Adnexae] : were not tender and not enlarged [RRR, No Murmurs] : RRR, no murmurs [CTAB] : CTAB

## 2020-01-13 LAB — HPV HIGH+LOW RISK DNA PNL CVX: NOT DETECTED

## 2020-03-25 NOTE — OB RN PATIENT PROFILE - PMH
[facility]    64yo F with rheumatoid arthritis, fibromyalgia, who presented from [facility] facility with 4-5 day of subjective fevers and a dry cough that has resolved. Now COVID-19 positive.    Problem/Plan - 1:  ·  Problem: Coronavirus infection.  Plan: Improved clinically - now with decreasing O2 requirements and normal RR. She has been asymptomatic for 72 hours. Stable on 3L NC, while being titrated off. Supplemental O2 currently needed due to COVID-19 infection.   - COVID +  - Legionella negative  - c/w Plaquenil 200 mg BID, Azithromycin 500 mg daily, thiamine 200 mg BID.     Problem/Plan - 2:  ·  Problem: Fibromyalgia.  Plan: Hx of fibromyalgia, was taking percocet in the past for pain control  - C/w tylenol for pain control  - C/w pregablin 100 mg TID  - C/w propanolol 80 mg (for migraine).     Problem/Plan - 3:  ·  Problem: RA (rheumatoid arthritis).  Plan: Hx of RA, w/ b/l toe deformities, sig joint pain, patient is bedbound  - C/w prednisone 5 mg daily  - C/w sulfasalazine 500 mg TID  - C/w Plaquenil 200 mg BID  - C/w protonix 20 mg daily.     Problem/Plan - 4:  ·  Problem: Osteoporosis.  Plan: On fosamax, not on formulary.   - Restart when patient returns to [facility] rehab.     Problem/Plan - 5:  ·  Problem: Hypothyroid.  Plan: Hx of hypothyroidism  - C/w home synthroid 25 mg.     Problem/Plan - 6:  Problem: Depression. Plan: Patient with hx of depression  - C/w home citalopram 40 mg.    Problem/Plan - 7:  ·  Problem: Nutrition, metabolism, and development symptoms.  Plan: F: no IVF  E: K>4 Mg>2, monitor and replete as needed  N: DASH diet.     Problem/Plan - 8:  ·  Problem: Prophylactic measure.  Plan: Ppx: protonix and lovenox  DNR/DNI. <<----- Click to add NO pertinent Past Medical History No pertinent past medical history

## 2020-04-28 ENCOUNTER — APPOINTMENT (OUTPATIENT)
Dept: OBGYN | Facility: CLINIC | Age: 30
End: 2020-04-28

## 2020-05-11 ENCOUNTER — RX RENEWAL (OUTPATIENT)
Age: 30
End: 2020-05-11

## 2020-07-06 ENCOUNTER — APPOINTMENT (OUTPATIENT)
Dept: OBGYN | Facility: CLINIC | Age: 30
End: 2020-07-06
Payer: MEDICAID

## 2020-07-06 VITALS — WEIGHT: 170 LBS | TEMPERATURE: 98.1 F | HEIGHT: 69 IN | BODY MASS INDEX: 25.18 KG/M2

## 2020-07-06 LAB
BILIRUB UR QL STRIP: NORMAL
CLARITY UR: CLEAR
GLUCOSE UR-MCNC: NORMAL
HCG UR QL: NORMAL EU/DL
HGB UR QL STRIP.AUTO: NORMAL
KETONES UR-MCNC: NORMAL
LEUKOCYTE ESTERASE UR QL STRIP: NORMAL
NITRITE UR QL STRIP: NORMAL
PH UR STRIP: 7
PROT UR STRIP-MCNC: NORMAL
SP GR UR STRIP: 1015

## 2020-07-06 PROCEDURE — 81003 URINALYSIS AUTO W/O SCOPE: CPT | Mod: QW

## 2020-07-06 PROCEDURE — 99213 OFFICE O/P EST LOW 20 MIN: CPT

## 2020-07-07 ENCOUNTER — APPOINTMENT (OUTPATIENT)
Dept: OBGYN | Facility: CLINIC | Age: 30
End: 2020-07-07

## 2020-07-15 ENCOUNTER — APPOINTMENT (OUTPATIENT)
Dept: OBGYN | Facility: CLINIC | Age: 30
End: 2020-07-15
Payer: MEDICAID

## 2020-07-15 PROCEDURE — 93975 VASCULAR STUDY: CPT

## 2020-07-15 PROCEDURE — 76830 TRANSVAGINAL US NON-OB: CPT

## 2020-08-26 NOTE — PROGRESS NOTE ADULT - ASSESSMENT
29 y/o  at 21w2d GA, with severe skeletal dysplasia, breech presentation, for IOL with cytotec, s/p cytotec x3, with epidural, now SROM,  -continue to monitor  -pain mgmt prn  -clear liquid diet  -next dose cytotec at 1900    Dr Grayson aware 27 y/o  at 21w2d GA, with severe skeletal dysplasia, breech presentation, for IOL with cytotec, s/p cytotec x3, with epidural,  -continue to monitor  -pain mgmt prn  -clear liquid diet  -next dose cytotec at 1900    Dr Grayson aware verbal cues/supervision

## 2020-10-29 ENCOUNTER — NON-APPOINTMENT (OUTPATIENT)
Age: 30
End: 2020-10-29

## 2020-11-11 ENCOUNTER — NON-APPOINTMENT (OUTPATIENT)
Age: 30
End: 2020-11-11

## 2020-11-13 ENCOUNTER — NON-APPOINTMENT (OUTPATIENT)
Age: 30
End: 2020-11-13

## 2021-01-27 ENCOUNTER — LABORATORY RESULT (OUTPATIENT)
Age: 31
End: 2021-01-27

## 2021-01-28 ENCOUNTER — NON-APPOINTMENT (OUTPATIENT)
Age: 31
End: 2021-01-28

## 2021-01-29 ENCOUNTER — NON-APPOINTMENT (OUTPATIENT)
Age: 31
End: 2021-01-29

## 2021-02-05 ENCOUNTER — NON-APPOINTMENT (OUTPATIENT)
Age: 31
End: 2021-02-05

## 2021-02-07 ENCOUNTER — LABORATORY RESULT (OUTPATIENT)
Age: 31
End: 2021-02-07

## 2021-02-08 ENCOUNTER — APPOINTMENT (OUTPATIENT)
Dept: OBGYN | Facility: CLINIC | Age: 31
End: 2021-02-08
Payer: MEDICAID

## 2021-02-08 ENCOUNTER — NON-APPOINTMENT (OUTPATIENT)
Age: 31
End: 2021-02-08

## 2021-02-08 VITALS — BODY MASS INDEX: 24.44 KG/M2 | WEIGHT: 165 LBS | HEIGHT: 69 IN | TEMPERATURE: 97.8 F

## 2021-02-08 LAB
BILIRUB UR QL STRIP: NORMAL
CLARITY UR: CLEAR
GLUCOSE UR-MCNC: NORMAL
HCG UR QL: 0.2 EU/DL
HGB UR QL STRIP.AUTO: NORMAL
KETONES UR-MCNC: NORMAL
LEUKOCYTE ESTERASE UR QL STRIP: NORMAL
NITRITE UR QL STRIP: NORMAL
PH UR STRIP: 5.5
PROT UR STRIP-MCNC: NORMAL
SP GR UR STRIP: 1.02

## 2021-02-08 PROCEDURE — 99395 PREV VISIT EST AGE 18-39: CPT

## 2021-02-08 PROCEDURE — 99072 ADDL SUPL MATRL&STAF TM PHE: CPT

## 2021-02-08 PROCEDURE — 76830 TRANSVAGINAL US NON-OB: CPT

## 2021-02-09 ENCOUNTER — NON-APPOINTMENT (OUTPATIENT)
Age: 31
End: 2021-02-09

## 2021-02-09 ENCOUNTER — LABORATORY RESULT (OUTPATIENT)
Age: 31
End: 2021-02-09

## 2021-02-11 ENCOUNTER — LABORATORY RESULT (OUTPATIENT)
Age: 31
End: 2021-02-11

## 2021-02-12 ENCOUNTER — NON-APPOINTMENT (OUTPATIENT)
Age: 31
End: 2021-02-12

## 2021-02-18 ENCOUNTER — APPOINTMENT (OUTPATIENT)
Dept: OBGYN | Facility: CLINIC | Age: 31
End: 2021-02-18

## 2021-03-15 ENCOUNTER — NON-APPOINTMENT (OUTPATIENT)
Age: 31
End: 2021-03-15

## 2021-03-15 LAB
HCG SERPL-MCNC: 124.5 MIU/ML
HCG SERPL-MCNC: 17.8 MIU/ML
PROGEST SERPL-MCNC: 12.5 NG/ML

## 2021-03-15 RX ORDER — NORETHINDRONE ACETATE AND ETHINYL ESTRADIOL AND FERROUS FUMARATE 1MG-20(21)
1-20 KIT ORAL
Qty: 84 | Refills: 1 | Status: DISCONTINUED | COMMUNITY
Start: 2020-01-08 | End: 2021-03-15

## 2021-03-15 RX ORDER — NORETHINDRONE ACETATE AND ETHINYL ESTRADIOL AND FERROUS FUMARATE 1.5-30(21)
1.5-3 KIT ORAL DAILY
Qty: 84 | Refills: 0 | Status: DISCONTINUED | COMMUNITY
Start: 2020-08-07 | End: 2021-03-15

## 2021-03-15 RX ORDER — NORETHINDRONE ACETATE AND ETHINYL ESTRADIOL AND FERROUS FUMARATE 1MG-20(21)
1-20 KIT ORAL
Qty: 84 | Refills: 0 | Status: DISCONTINUED | COMMUNITY
Start: 2020-05-11 | End: 2021-03-15

## 2021-03-16 ENCOUNTER — NON-APPOINTMENT (OUTPATIENT)
Age: 31
End: 2021-03-16

## 2021-03-21 LAB — PROGEST SERPL-MCNC: 12.2 NG/ML

## 2021-03-22 ENCOUNTER — NON-APPOINTMENT (OUTPATIENT)
Age: 31
End: 2021-03-22

## 2021-04-15 ENCOUNTER — LABORATORY RESULT (OUTPATIENT)
Age: 31
End: 2021-04-15

## 2021-04-15 ENCOUNTER — APPOINTMENT (OUTPATIENT)
Dept: OBGYN | Facility: CLINIC | Age: 31
End: 2021-04-15
Payer: MEDICAID

## 2021-04-15 VITALS — TEMPERATURE: 97.8 F | HEIGHT: 69 IN | WEIGHT: 173 LBS | BODY MASS INDEX: 25.62 KG/M2

## 2021-04-15 PROCEDURE — 99072 ADDL SUPL MATRL&STAF TM PHE: CPT

## 2021-04-15 PROCEDURE — 76830 TRANSVAGINAL US NON-OB: CPT

## 2021-04-21 ENCOUNTER — APPOINTMENT (OUTPATIENT)
Dept: OBGYN | Facility: CLINIC | Age: 31
End: 2021-04-21
Payer: MEDICAID

## 2021-04-21 VITALS — HEIGHT: 69 IN | WEIGHT: 171 LBS | TEMPERATURE: 98.2 F | BODY MASS INDEX: 25.33 KG/M2

## 2021-04-21 PROCEDURE — 99212 OFFICE O/P EST SF 10 MIN: CPT

## 2021-04-21 PROCEDURE — 99072 ADDL SUPL MATRL&STAF TM PHE: CPT

## 2021-04-22 ENCOUNTER — NON-APPOINTMENT (OUTPATIENT)
Age: 31
End: 2021-04-22

## 2021-05-03 ENCOUNTER — APPOINTMENT (OUTPATIENT)
Dept: OBGYN | Facility: CLINIC | Age: 31
End: 2021-05-03
Payer: MEDICAID

## 2021-05-03 VITALS — HEIGHT: 67 IN | WEIGHT: 171 LBS | BODY MASS INDEX: 26.84 KG/M2 | TEMPERATURE: 98.2 F

## 2021-05-03 PROCEDURE — 99213 OFFICE O/P EST LOW 20 MIN: CPT

## 2021-05-03 PROCEDURE — 99072 ADDL SUPL MATRL&STAF TM PHE: CPT

## 2021-05-04 ENCOUNTER — ASOB RESULT (OUTPATIENT)
Age: 31
End: 2021-05-04

## 2021-05-04 ENCOUNTER — APPOINTMENT (OUTPATIENT)
Dept: ANTEPARTUM | Facility: CLINIC | Age: 31
End: 2021-05-04
Payer: MEDICAID

## 2021-05-04 ENCOUNTER — NON-APPOINTMENT (OUTPATIENT)
Age: 31
End: 2021-05-04

## 2021-05-04 VITALS
BODY MASS INDEX: 27.1 KG/M2 | SYSTOLIC BLOOD PRESSURE: 98 MMHG | TEMPERATURE: 98 F | DIASTOLIC BLOOD PRESSURE: 70 MMHG | HEART RATE: 78 BPM | WEIGHT: 173 LBS

## 2021-05-04 PROCEDURE — 99072 ADDL SUPL MATRL&STAF TM PHE: CPT

## 2021-05-04 PROCEDURE — 76813 OB US NUCHAL MEAS 1 GEST: CPT | Mod: 26

## 2021-05-04 PROCEDURE — 99212 OFFICE O/P EST SF 10 MIN: CPT | Mod: 25

## 2021-05-05 ENCOUNTER — NON-APPOINTMENT (OUTPATIENT)
Age: 31
End: 2021-05-05

## 2021-05-06 ENCOUNTER — NON-APPOINTMENT (OUTPATIENT)
Age: 31
End: 2021-05-06

## 2021-05-17 ENCOUNTER — NON-APPOINTMENT (OUTPATIENT)
Age: 31
End: 2021-05-17

## 2021-05-24 ENCOUNTER — APPOINTMENT (OUTPATIENT)
Dept: OBGYN | Facility: CLINIC | Age: 31
End: 2021-05-24
Payer: MEDICAID

## 2021-05-24 ENCOUNTER — NON-APPOINTMENT (OUTPATIENT)
Age: 31
End: 2021-05-24

## 2021-05-24 VITALS — BODY MASS INDEX: 25.62 KG/M2 | WEIGHT: 173 LBS | HEIGHT: 69 IN | TEMPERATURE: 97.9 F

## 2021-05-24 PROCEDURE — 99213 OFFICE O/P EST LOW 20 MIN: CPT

## 2021-05-27 ENCOUNTER — APPOINTMENT (OUTPATIENT)
Dept: OBGYN | Facility: CLINIC | Age: 31
End: 2021-05-27
Payer: MEDICAID

## 2021-05-27 PROCEDURE — 76830 TRANSVAGINAL US NON-OB: CPT

## 2021-05-27 PROCEDURE — 93975 VASCULAR STUDY: CPT | Mod: 59

## 2021-06-02 ENCOUNTER — NON-APPOINTMENT (OUTPATIENT)
Age: 31
End: 2021-06-02

## 2021-06-11 ENCOUNTER — LABORATORY RESULT (OUTPATIENT)
Age: 31
End: 2021-06-11

## 2021-06-14 ENCOUNTER — NON-APPOINTMENT (OUTPATIENT)
Age: 31
End: 2021-06-14

## 2021-06-16 ENCOUNTER — NON-APPOINTMENT (OUTPATIENT)
Age: 31
End: 2021-06-16

## 2021-06-16 ENCOUNTER — APPOINTMENT (OUTPATIENT)
Dept: OBGYN | Facility: CLINIC | Age: 31
End: 2021-06-16
Payer: MEDICAID

## 2021-06-16 VITALS
TEMPERATURE: 98.2 F | WEIGHT: 177 LBS | BODY MASS INDEX: 26.14 KG/M2 | DIASTOLIC BLOOD PRESSURE: 60 MMHG | SYSTOLIC BLOOD PRESSURE: 100 MMHG

## 2021-06-16 PROCEDURE — 99213 OFFICE O/P EST LOW 20 MIN: CPT

## 2021-06-17 LAB
BILIRUB UR QL STRIP: NORMAL
GLUCOSE UR-MCNC: NORMAL
HCG UR QL: 0.2 EU/DL
HGB UR QL STRIP.AUTO: NORMAL
KETONES UR-MCNC: NORMAL
LEUKOCYTE ESTERASE UR QL STRIP: NORMAL
NITRITE UR QL STRIP: NORMAL
PH UR STRIP: 7
PROT UR STRIP-MCNC: NORMAL
SP GR UR STRIP: 1.02

## 2021-07-06 ENCOUNTER — APPOINTMENT (OUTPATIENT)
Dept: ANTEPARTUM | Facility: CLINIC | Age: 31
End: 2021-07-06
Payer: MEDICAID

## 2021-07-06 ENCOUNTER — NON-APPOINTMENT (OUTPATIENT)
Age: 31
End: 2021-07-06

## 2021-07-06 ENCOUNTER — APPOINTMENT (OUTPATIENT)
Dept: MATERNAL FETAL MEDICINE | Facility: CLINIC | Age: 31
End: 2021-07-06
Payer: MEDICAID

## 2021-07-06 ENCOUNTER — ASOB RESULT (OUTPATIENT)
Age: 31
End: 2021-07-06

## 2021-07-06 VITALS
SYSTOLIC BLOOD PRESSURE: 110 MMHG | TEMPERATURE: 98 F | DIASTOLIC BLOOD PRESSURE: 68 MMHG | WEIGHT: 183 LBS | HEART RATE: 72 BPM | HEIGHT: 69 IN | BODY MASS INDEX: 27.11 KG/M2

## 2021-07-06 PROCEDURE — 76817 TRANSVAGINAL US OBSTETRIC: CPT | Mod: 26

## 2021-07-06 PROCEDURE — 99214 OFFICE O/P EST MOD 30 MIN: CPT | Mod: 25

## 2021-07-06 PROCEDURE — 76811 OB US DETAILED SNGL FETUS: CPT | Mod: 26

## 2021-07-06 PROCEDURE — ZZZZZ: CPT

## 2021-07-08 ENCOUNTER — APPOINTMENT (OUTPATIENT)
Dept: OBGYN | Facility: CLINIC | Age: 31
End: 2021-07-08

## 2021-07-09 ENCOUNTER — INPATIENT (INPATIENT)
Facility: HOSPITAL | Age: 31
LOS: 0 days | Discharge: HOME | End: 2021-07-10
Attending: OBSTETRICS & GYNECOLOGY | Admitting: OBSTETRICS & GYNECOLOGY
Payer: MEDICAID

## 2021-07-09 ENCOUNTER — NON-APPOINTMENT (OUTPATIENT)
Age: 31
End: 2021-07-09

## 2021-07-09 ENCOUNTER — APPOINTMENT (OUTPATIENT)
Dept: ANTEPARTUM | Facility: CLINIC | Age: 31
End: 2021-07-09

## 2021-07-09 VITALS
RESPIRATION RATE: 18 BRPM | HEIGHT: 69 IN | SYSTOLIC BLOOD PRESSURE: 109 MMHG | DIASTOLIC BLOOD PRESSURE: 65 MMHG | TEMPERATURE: 98 F | HEART RATE: 70 BPM | WEIGHT: 184.09 LBS

## 2021-07-09 LAB
A1C WITH ESTIMATED AVERAGE GLUCOSE RESULT: 4.4 % — SIGNIFICANT CHANGE UP (ref 4–5.6)
AMPHET UR-MCNC: NEGATIVE — SIGNIFICANT CHANGE UP
APPEARANCE UR: ABNORMAL
APTT BLD: 24.4 SEC — LOW (ref 27–39.2)
BACTERIA # UR AUTO: NEGATIVE — SIGNIFICANT CHANGE UP
BARBITURATES UR SCN-MCNC: NEGATIVE — SIGNIFICANT CHANGE UP
BASOPHILS # BLD AUTO: 0.02 K/UL — SIGNIFICANT CHANGE UP (ref 0–0.2)
BASOPHILS NFR BLD AUTO: 0.1 % — SIGNIFICANT CHANGE UP (ref 0–1)
BENZODIAZ UR-MCNC: NEGATIVE — SIGNIFICANT CHANGE UP
BILIRUB UR-MCNC: NEGATIVE — SIGNIFICANT CHANGE UP
BLD GP AB SCN SERPL QL: SIGNIFICANT CHANGE UP
BUPRENORPHINE SCREEN, URINE RESULT: NEGATIVE — SIGNIFICANT CHANGE UP
COCAINE METAB.OTHER UR-MCNC: NEGATIVE — SIGNIFICANT CHANGE UP
COLOR SPEC: YELLOW — SIGNIFICANT CHANGE UP
DIFF PNL FLD: NEGATIVE — SIGNIFICANT CHANGE UP
EOSINOPHIL # BLD AUTO: 0.06 K/UL — SIGNIFICANT CHANGE UP (ref 0–0.7)
EOSINOPHIL NFR BLD AUTO: 0.4 % — SIGNIFICANT CHANGE UP (ref 0–8)
EPI CELLS # UR: 12 /HPF — HIGH (ref 0–5)
ESTIMATED AVERAGE GLUCOSE: 80 MG/DL — SIGNIFICANT CHANGE UP (ref 68–114)
FENTANYL UR QL: NEGATIVE — SIGNIFICANT CHANGE UP
FIBRINOGEN PPP-MCNC: 645 MG/DL — HIGH (ref 204.4–570.6)
GLUCOSE UR QL: NEGATIVE — SIGNIFICANT CHANGE UP
HCT VFR BLD CALC: 38.2 % — SIGNIFICANT CHANGE UP (ref 37–47)
HGB BLD-MCNC: 12.7 G/DL — SIGNIFICANT CHANGE UP (ref 12–16)
HYALINE CASTS # UR AUTO: 9 /LPF — HIGH (ref 0–7)
IMM GRANULOCYTES NFR BLD AUTO: 0.9 % — HIGH (ref 0.1–0.3)
INR BLD: 1.03 RATIO — SIGNIFICANT CHANGE UP (ref 0.65–1.3)
KETONES UR-MCNC: NEGATIVE — SIGNIFICANT CHANGE UP
L&D DRUG SCREEN, URINE: SIGNIFICANT CHANGE UP
LEUKOCYTE ESTERASE UR-ACNC: NEGATIVE — SIGNIFICANT CHANGE UP
LYMPHOCYTES # BLD AUTO: 1.95 K/UL — SIGNIFICANT CHANGE UP (ref 1.2–3.4)
LYMPHOCYTES # BLD AUTO: 14.6 % — LOW (ref 20.5–51.1)
MCHC RBC-ENTMCNC: 31.4 PG — HIGH (ref 27–31)
MCHC RBC-ENTMCNC: 33.2 G/DL — SIGNIFICANT CHANGE UP (ref 32–37)
MCV RBC AUTO: 94.6 FL — SIGNIFICANT CHANGE UP (ref 81–99)
METHADONE UR-MCNC: NEGATIVE — SIGNIFICANT CHANGE UP
MONOCYTES # BLD AUTO: 0.65 K/UL — HIGH (ref 0.1–0.6)
MONOCYTES NFR BLD AUTO: 4.9 % — SIGNIFICANT CHANGE UP (ref 1.7–9.3)
NEUTROPHILS # BLD AUTO: 10.59 K/UL — HIGH (ref 1.4–6.5)
NEUTROPHILS NFR BLD AUTO: 79.1 % — HIGH (ref 42.2–75.2)
NITRITE UR-MCNC: NEGATIVE — SIGNIFICANT CHANGE UP
NRBC # BLD: 0 /100 WBCS — SIGNIFICANT CHANGE UP (ref 0–0)
OPIATES UR-MCNC: NEGATIVE — SIGNIFICANT CHANGE UP
OXYCODONE UR-MCNC: NEGATIVE — SIGNIFICANT CHANGE UP
PCP UR-MCNC: NEGATIVE — SIGNIFICANT CHANGE UP
PH UR: 6 — SIGNIFICANT CHANGE UP (ref 5–8)
PLATELET # BLD AUTO: 194 K/UL — SIGNIFICANT CHANGE UP (ref 130–400)
PRENATAL SYPHILIS TEST: SIGNIFICANT CHANGE UP
PROPOXYPHENE QUALITATIVE URINE RESULT: NEGATIVE — SIGNIFICANT CHANGE UP
PROT UR-MCNC: ABNORMAL
PROTHROM AB SERPL-ACNC: 11.8 SEC — SIGNIFICANT CHANGE UP (ref 9.95–12.87)
RBC # BLD: 4.04 M/UL — LOW (ref 4.2–5.4)
RBC # FLD: 12.2 % — SIGNIFICANT CHANGE UP (ref 11.5–14.5)
RBC CASTS # UR COMP ASSIST: 2 /HPF — SIGNIFICANT CHANGE UP (ref 0–4)
SARS-COV-2 RNA SPEC QL NAA+PROBE: SIGNIFICANT CHANGE UP
SP GR SPEC: 1.03 — HIGH (ref 1.01–1.03)
TSH SERPL-MCNC: 1.33 UIU/ML — SIGNIFICANT CHANGE UP (ref 0.27–4.2)
UROBILINOGEN FLD QL: ABNORMAL
WBC # BLD: 13.39 K/UL — HIGH (ref 4.8–10.8)
WBC # FLD AUTO: 13.39 K/UL — HIGH (ref 4.8–10.8)
WBC UR QL: 5 /HPF — SIGNIFICANT CHANGE UP (ref 0–5)

## 2021-07-09 PROCEDURE — 59855 INDUCED ABORTION 1+VAG SUPP: CPT

## 2021-07-09 RX ORDER — DEXAMETHASONE 0.5 MG/5ML
4 ELIXIR ORAL EVERY 6 HOURS
Refills: 0 | Status: DISCONTINUED | OUTPATIENT
Start: 2021-07-09 | End: 2021-07-10

## 2021-07-09 RX ORDER — CITRIC ACID/SODIUM CITRATE 300-500 MG
15 SOLUTION, ORAL ORAL EVERY 6 HOURS
Refills: 0 | Status: DISCONTINUED | OUTPATIENT
Start: 2021-07-09 | End: 2021-07-10

## 2021-07-09 RX ORDER — OXYTOCIN 10 UNIT/ML
333.33 VIAL (ML) INJECTION
Qty: 20 | Refills: 0 | Status: DISCONTINUED | OUTPATIENT
Start: 2021-07-09 | End: 2021-07-10

## 2021-07-09 RX ORDER — ONDANSETRON 8 MG/1
4 TABLET, FILM COATED ORAL EVERY 6 HOURS
Refills: 0 | Status: DISCONTINUED | OUTPATIENT
Start: 2021-07-09 | End: 2021-07-10

## 2021-07-09 RX ORDER — SODIUM CHLORIDE 9 MG/ML
1000 INJECTION, SOLUTION INTRAVENOUS
Refills: 0 | Status: DISCONTINUED | OUTPATIENT
Start: 2021-07-09 | End: 2021-07-10

## 2021-07-09 RX ORDER — NALOXONE HYDROCHLORIDE 4 MG/.1ML
0.1 SPRAY NASAL
Refills: 0 | Status: DISCONTINUED | OUTPATIENT
Start: 2021-07-09 | End: 2021-07-10

## 2021-07-09 RX ORDER — FENTANYL/BUPIVACAINE/NS/PF 2MCG/ML-.1
250 PLASTIC BAG, INJECTION (ML) INJECTION
Refills: 0 | Status: DISCONTINUED | OUTPATIENT
Start: 2021-07-09 | End: 2021-07-10

## 2021-07-09 RX ADMIN — Medication 1 MILLIGRAM(S): at 14:44

## 2021-07-09 NOTE — OB PROVIDER H&P - NSHPLABSRESULTS_GEN_ALL_CORE
7/6/21  ULTRASOUND  @ 20 weeks multiple skeletal abnormalities placenta 8-9 mm away from internal OS  Long bones measuring 3-6 weeks behind the gestational age, long bones of lower extremity appeared bowed there is polydactyly of the right hand  the cardiothoracic ration is .594 which is elevated.  The thorax circumference is measuring at the 22 %ile, Chest appears bell shaped the fl/ac is 0.11 in the lethal range, neck is hyperextended, right side of the heart appears larger than the left side.    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

## 2021-07-09 NOTE — PROCEDURE NOTE - ADDITIONAL PROCEDURE DETAILS
Fetal Genetic Abnormality.   Epidural Redo do to Inadquate analgesia.  VS Stable T 10 Sensory level.

## 2021-07-09 NOTE — PROGRESS NOTE ADULT - ASSESSMENT
A/P: 31y   @  20.3 weeks, fetal skeletal dysplasia for cytotec TOP  -epi for pain management   -cont toco  -f/u pending labs  -cont to monitor vitals  -cont iv hydration  -will reevaluate in 3hrs    Dr. Tamika johnston.

## 2021-07-09 NOTE — OB PROVIDER IHI INDUCTION/AUGMENTATION NOTE - NS_CHECKALL_OBGYN_ALL_OB
Order was written/H&P was completed/Contractions pattern was reviewed/FHR was reviewed Order was written/H&P was completed/Contractions pattern was reviewed/FHR was reviewed/Induction / Augmentation was discussed

## 2021-07-09 NOTE — PROCEDURE NOTE - NSLOADDOSE_OBGYN_ALL_OB
see below/Other, please specify
RENEE@15ccs/hr+ Fentanyl 2 mcgs/cc/10 ML of 0.25 percent Bupivicaine/Continuous Bupivicaine 0.1 percent

## 2021-07-09 NOTE — OB RN PATIENT PROFILE - NS_PRENATALSTART_OBGYN_ALL_OB
Telephone  Plan was to meet with Klever from [facility] at client's home. Visit rescheduled by Klever. Will meet client next week instead. He is doing well per his mother.   Started first trimester

## 2021-07-09 NOTE — OB PROVIDER H&P - FAMILY HISTORY
Grandparent  Still living? Unknown  Family history of breast cancer, Age at diagnosis: Age Unknown

## 2021-07-09 NOTE — PROCEDURE NOTE - NSANESMONIT_OBGYN_ALL_OB
Non-Invasive Blood Pressure Monitoring/Routine Monitoring/Fetal Heart Rate Monitor
Routine Monitoring

## 2021-07-09 NOTE — OB PROVIDER H&P - NSHPPHYSICALEXAM_GEN_ALL_CORE
PHYSICAL EXAM:  T(F): 98.2 (07-09 @ 06:46)  HR: 70 (07-09 @ 06:50)  BP: 109/65 (07-09 @ 06:50)  RR: 18 (07-09 @ 06:46)  Constitutional: AAOx3, NAD  Abdomen: Soft, gravid, nontender, no palpable ctx

## 2021-07-09 NOTE — OB PROVIDER H&P - ASSESSMENT
31y   @20.2 weeks fetus with skeletal anomaly, suspected chromosomal abnormality for cytotec termination of pregnancy  Admit to Labor & delivery  IV hydration  admission lab  TORCH labs  Clear fluids  EFM & TOCO monitoring  analgesia prn

## 2021-07-09 NOTE — OB PROVIDER H&P - LABOR: CERVICAL EFFACEMENT
[0%

## 2021-07-09 NOTE — PROCEDURE NOTE - NSPERFORMEDBY_GEN_A_CORE
Postpartum Support Group-Offering Hope and Help for New Mothers  The Postpartum Support Group meets on the  and  of each month from 9:00am-10:30am in the [address] [place] [address], located on the lobby floor, behind the cafeteria. Breastfeeding Support Group  [facility] Nursing Mothers Group meets the  and  of each month in the [facility] from 10:00-11:00, (located behind [employer] on the first floor) Meetings are facilitated by board certified lactation consultants and all breastfeeding moms and their infants are invited. POSTPARTUM DISCHARGE INSTRUCTIONS       Name:  Benoit Wells  YOB: 1992  Admission Diagnosis:  Abdominal pain [R10.9]  Normal labor [O80, Z37.9]     Discharge Diagnosis:    Problem List as of 2019 Date Reviewed: 2019          Codes Class Noted - Resolved    Abdominal pain ICD-10-CM: R10.9  ICD-9-CM: 789.00  9/3/2019 - Present        Normal labor ICD-10-CM: [place], Z37.9  ICD-9-CM: 614  9/3/2019 - Present        15 weeks gestation of pregnancy ICD-10-CM: Z3A.15  ICD-9-CM: V22.2  3/25/2019 - Present    Overview Addendum 2019  2:22 PM by Milan Fleischer A     Primary Provider: Marily  - h/o TAB x 1  EDC by lmp  Pertinents:  Baby ASA start 3/25    IOB labs: Hemoglobinopathy: Hgb J-Yamil (probable incidental), otherwise wnl  Genetic Screening: Declines  Anatomy:  FETAL ANATOMY WAS WELL VISUALIZED AND APPEARS WNL. NO ABNORMALITIES WERE SEEN ON TODAYS EXAM.  APPROPRIATE GROWTH MEASURED. SIZE = DATES. LARON, PLACENTA AND CERVIX APPEAR WITHIN NORMAL LIMITS. GENDER: FEMALE. Anterior Placenta. GTT: 76  Flu: Declines  TDAP: next visit  Rhogam: Opos  Third Tri Labs: wnl  GBS: negative    Pain mgmt.  in labor: Epidural  Feeding: breast  Circ: n/a  Social:  @ [facility]                   Attending Physician:  Amy Toussaint CNM    Delivery Type:  Vaginal Childbirth: What To
Expect At Home    Your Recovery: Your body will slowly heal in the next few weeks. It is easy to get too tired and overwhelmed during the first weeks after your baby is born. Changes in your hormones can shift your mood without warning. You may find it hard to meet the extra demands on your energy and time. Take it easy on yourself. Follow-up care is a key part of your treatment and safety. Be sure to make and go to all appointments, and call your doctor if you are having problems. It's also a good idea to know your test results and keep a list of the medicines you take. How can you care for yourself at home? Vaginal bleeding and cramps  · After delivery, you will have a bloody discharge from the vagina. This will turn pink within a week and then white or yellow after about 10 days. It may last for 2 to 4 weeks or longer, until the uterus has healed. Use pads instead of tampons until you stop bleeding. · Do not worry if you pass some blood clots, as long as they are smaller than a golf ball. If you have a tear or stitches in your vaginal area, change the pad at least every 4 hours to prevent soreness and infection. · You may have cramps for the first few days after childbirth. These are normal and occur as the uterus shrinks to normal size. Take an over-the-counter pain medicine, such as acetaminophen (Tylenol), ibuprofen (Advil, Motrin), or naproxen (Aleve), for cramps. Read and follow all instructions on the label. Do not take aspirin, because it can cause more bleeding. Do not take acetaminophen (Tylenol) and other acetaminophen containing medications (i.e. Percocet) at the same time. Breast fullness  · Your breasts may overfill (engorge) in the first few days after delivery. To help milk flow and to relieve pain, warm your breasts in the shower or by using warm, moist towels before nursing. · If you are not nursing, do not put warmth on your breasts or touch your breasts.  Wear a tight bra or
sports bra and use ice until the fullness goes away. This usually takes 2 to 3 days. · Put ice or a cold pack on your breast after nursing to reduce swelling and pain. Put a thin cloth between the ice and your skin. Activity  · Eat a balanced diet. Do not try to lose weight by cutting calories. Keep taking your prenatal vitamins, or take a multivitamin. · Get as much rest as you can. Try to take naps when your baby sleeps during the day. · Get some exercise every day. But do not do any heavy exercise until your doctor says it is okay. · Wait until you are healed (about 4 to 6 weeks) before you have sexual intercourse. Your doctor will tell you when it is okay to have sex. · Talk to your doctor about birth control. You can get pregnant even before your period returns. Also, you can get pregnant while you are breast-feeding. Mental Health  · Many women get the \"baby blues\" during the first few days after childbirth. You may lose sleep, feel irritable, and cry easily. You may feel happy one minute and sad the next. Hormone changes are one cause of these emotional changes. Also, the demands of a new baby, along with visits from relatives or other family needs, add to a mother's stress. The \"baby blues\" often peak around the fourth day. Then they ease up in less than 2 weeks. · If your moodiness or anxiety lasts for more than 2 weeks, or if you feel like life is not worth living, you may have postpartum depression. This is different for each mother. Some mothers with serious depression may worry intensely about their infant's well-being. Others may feel distant from their child. Some mothers might even feel that they might harm their baby. A mother may have signs of paranoia, wondering if someone is watching her. · With all the changes in your life, you may not know if you are depressed. Pregnancy sometimes causes changes in how you feel that are similar to the symptoms of depression.   · Symptoms of
depression include:  · Feeling sad or hopeless and losing interest in daily activities. These are the most common symptoms of depression. · Sleeping too much or not enough. · Feeling tired. You may feel as if you have no energy. · Eating too much or too little. · POSTPARTUM SUPPORT INTERNATIONAL (PSI) offers a Warm line; Chat with the Expert phone sessions; Information and Articles about Pregnancy and Postpartum Mood Disorders; Comprehensive List of Free Support Groups; Knowledgeable local coordinators who will offer support, information, and resources; Guide to Resources on [employer]; Calendar of events in the  mood disorders community; Latest News and Research; and [facility] [address] for [employer]. 9-234-236-TALK (4-427.165.5660) and 6-032-AMJPGLY (7-901.502.7667). If you or someone you know talks about suicide or feeling hopeless, get help right away. Constipation and Hemorrhoids  · Drink plenty of fluids, enough so that your urine is light yellow or clear like water. If you have kidney, heart, or liver disease and have to limit fluids, talk with your doctor before you increase the amount of fluids you drink. · Eat plenty of fiber each day. Have a bran muffin or bran cereal for breakfast, and try eating a piece of fruit for a mid-afternoon snack. · For painful, itchy hemorrhoids, put ice or a cold pack on the area several times a day for 10 minutes at a time. Follow this by putting a warm compress on the area for another 10 to 20 minutes or by sitting in a shallow, warm bath. When should you call for help? Call 911 anytime you think you may need emergency care.  For example, call if:  · You are thinking
of hurting yourself, your baby, or anyone else. · You passed out (lost consciousness). · You have symptoms of a blood clot in your lung (called a pulmonary embolism). These may include:    · Sudden chest pain. · Trouble breathing. · Coughing up blood. Call your doctor now or seek immediate medical care if:  · You have severe vaginal bleeding. · You are soaking through a pad each hour for 2 or more hours. · Your vaginal bleeding seems to be getting heavier or is still bright red 4 days after delivery. · You are dizzy or lightheaded, or you feel like you may faint. · You are vomiting or cannot keep fluids down. · You have a fever. · You have new or more belly pain. · You pass tissue (not just blood). · Your vaginal discharge smells bad. · Your belly feels tender or full and hard. · Your breasts are continuously painful or red. · You feel sad, anxious, or hopeless for more than a few days. · You have sudden, severe pain in your belly. · You have symptoms of a blood clot in your leg (called a deep vein thrombosis),          such as:  · Pain in your calf, back of the knee, thigh, or groin. · Redness and swelling in your leg or groin. · You have symptoms of preeclampsia, such as:  · Sudden swelling of your face, hands, or feet. · New vision problems (such as dimness or blurring). · A severe headache. · Your blood pressure is higher than it should be or rises suddenly. · You have new nausea or vomiting. Watch closely for changes in your health, and be sure to contact your doctor if you have any problems. Additional Information:  Not applicable    These are general instructions for a healthy lifestyle:    No smoking/ No tobacco products/ Avoid exposure to second hand smoke    Surgeon General's Warning:  Quitting smoking now greatly reduces serious risk to your health.     Obesity, smoking, and sedentary lifestyle greatly increases your risk for illness    A healthy diet, regular physical
exercise & weight monitoring are important for maintaining a healthy lifestyle    Recognize signs and symptoms of STROKE:    F-face looks uneven    A-arms unable to move or move unevenly    S-speech slurred or non-existent    T-time-call 911 as soon as signs and symptoms begin - DO NOT go       back to bed or wait to see if you get better - TIME IS BRAIN. I have had the opportunity to make my options or choices for discharge. I have received and understand these instructions.
Anesthesiologist
Anesthesiologist

## 2021-07-09 NOTE — PROCEDURE NOTE - ADDITIONAL PROCEDURE DETAILS
Lumbar epidural performed at L4-5. Standard ASA monitors including FHR. Sterile gloves, betadine prep. 1% lidocaine for local infiltration. 17g touhy. CHENG with saline. 27g carissa used to make a dural puncture with + CSF. Carissa needle removed and epidural catheter threaded easily. Touhy needle removed. Catheter secured in place. Negative aspiration. Test dose consisiting of 3ml 1.5% lidocaine with epinephrine was negative. Remaining 2ml of test dose consisting of 1.5% lidocaine with epinephrine and 5ml of 1% lidocaine given incrementally after negative aspiration. Patient tolerated procedure and was hemodynamically stable throughout. T10 level bilaterally. Epidural infusion consisting of 250ml 0.1% bupivacaine with fentanyl 2mcg/ml at 15ml/hr. Lumbar epidural performed at L4-5. Standard ASA monitors including FHR. Sterile gloves, betadine prep. 1% lidocaine for local infiltration. 17g touhy. CHENG with saline. 27g carissa used to make a dural puncture with + CSF. Carissa needle removed and epidural catheter threaded easily. Touhy needle removed. Catheter secured in place. Negative aspiration. Test dose consisiting of 3ml 1.5% lidocaine with epinephrine was negative. Remaining 2ml of test dose consisting of 1.5% lidocaine with epinephrine and 5ml of 1% lidocaine given incrementally after negative aspiration. Patient tolerated procedure and was hemodynamically stable throughout. T10 level bilaterally. Epidural infusion consisting of 250ml 0.1% bupivacaine with fentanyl 2mcg/ml at 15ml/hr.    0130 Notified by OB team that patient is having pain. After negative aspiration a bolus consisting of 10ml 0.125% bupivacaine was administered incrementally. Vital signs stable. BP set to Q3min. RN notified and to remain at bedside for 20 min. T10 level bilaterally after bolus. FHR stable throughout.

## 2021-07-09 NOTE — OB PROVIDER H&P - HISTORY OF PRESENT ILLNESS
The pt is a 31y y/o G [id number] @ 20.3 weeks  edc  11/23/21 dated by last menstrual period fetus with skeletal dysplasua who presents to labor & delivery for termination of pregnancy.  Patient reports no vaginal bleeding , contractions or leaking fluid

## 2021-07-10 ENCOUNTER — RESULT REVIEW (OUTPATIENT)
Age: 31
End: 2021-07-10

## 2021-07-10 ENCOUNTER — TRANSCRIPTION ENCOUNTER (OUTPATIENT)
Age: 31
End: 2021-07-10

## 2021-07-10 VITALS — SYSTOLIC BLOOD PRESSURE: 100 MMHG | DIASTOLIC BLOOD PRESSURE: 56 MMHG | HEART RATE: 69 BPM

## 2021-07-10 LAB
CMV IGM SERPL-ACNC: <30 AU/ML — SIGNIFICANT CHANGE UP (ref 0–29.9)
HSV 1+2 IGM SER-IMP: <0.91 RATIO — SIGNIFICANT CHANGE UP (ref 0–0.9)
KLEIHAUER-BETKE CALCULATION: 0.1 % — SIGNIFICANT CHANGE UP (ref 0–0.3)
MEV IGM SER-ACNC: <20 AU/ML — SIGNIFICANT CHANGE UP (ref 0–19.9)
T GONDII IGM SER IA-ACNC: <3 AU/ML — SIGNIFICANT CHANGE UP (ref 0–7.9)

## 2021-07-10 PROCEDURE — 88300 SURGICAL PATH GROSS: CPT | Mod: 26

## 2021-07-10 RX ORDER — TETANUS TOXOID, REDUCED DIPHTHERIA TOXOID AND ACELLULAR PERTUSSIS VACCINE, ADSORBED 5; 2.5; 8; 8; 2.5 [IU]/.5ML; [IU]/.5ML; UG/.5ML; UG/.5ML; UG/.5ML
0.5 SUSPENSION INTRAMUSCULAR ONCE
Refills: 0 | Status: DISCONTINUED | OUTPATIENT
Start: 2021-07-10 | End: 2021-07-10

## 2021-07-10 RX ORDER — DIPHENHYDRAMINE HCL 50 MG
25 CAPSULE ORAL EVERY 6 HOURS
Refills: 0 | Status: DISCONTINUED | OUTPATIENT
Start: 2021-07-10 | End: 2021-07-10

## 2021-07-10 RX ORDER — OXYTOCIN 10 UNIT/ML
2 VIAL (ML) INJECTION
Qty: 30 | Refills: 0 | Status: DISCONTINUED | OUTPATIENT
Start: 2021-07-10 | End: 2021-07-10

## 2021-07-10 RX ORDER — BENZOCAINE 10 %
1 GEL (GRAM) MUCOUS MEMBRANE EVERY 6 HOURS
Refills: 0 | Status: DISCONTINUED | OUTPATIENT
Start: 2021-07-10 | End: 2021-07-10

## 2021-07-10 RX ORDER — IBUPROFEN 200 MG
600 TABLET ORAL EVERY 6 HOURS
Refills: 0 | Status: DISCONTINUED | OUTPATIENT
Start: 2021-07-10 | End: 2021-07-10

## 2021-07-10 RX ORDER — DIBUCAINE 1 %
1 OINTMENT (GRAM) RECTAL EVERY 6 HOURS
Refills: 0 | Status: DISCONTINUED | OUTPATIENT
Start: 2021-07-10 | End: 2021-07-10

## 2021-07-10 RX ORDER — MAGNESIUM HYDROXIDE 400 MG/1
30 TABLET, CHEWABLE ORAL
Refills: 0 | Status: DISCONTINUED | OUTPATIENT
Start: 2021-07-10 | End: 2021-07-10

## 2021-07-10 RX ORDER — IBUPROFEN 200 MG
1 TABLET ORAL
Qty: 0 | Refills: 0 | DISCHARGE
Start: 2021-07-10

## 2021-07-10 RX ORDER — KETOROLAC TROMETHAMINE 30 MG/ML
30 SYRINGE (ML) INJECTION ONCE
Refills: 0 | Status: DISCONTINUED | OUTPATIENT
Start: 2021-07-10 | End: 2021-07-10

## 2021-07-10 RX ORDER — PRAMOXINE HYDROCHLORIDE 150 MG/15G
1 AEROSOL, FOAM RECTAL EVERY 4 HOURS
Refills: 0 | Status: DISCONTINUED | OUTPATIENT
Start: 2021-07-10 | End: 2021-07-10

## 2021-07-10 RX ORDER — AER TRAVELER 0.5 G/1
1 SOLUTION RECTAL; TOPICAL EVERY 4 HOURS
Refills: 0 | Status: DISCONTINUED | OUTPATIENT
Start: 2021-07-10 | End: 2021-07-10

## 2021-07-10 RX ORDER — SIMETHICONE 80 MG/1
80 TABLET, CHEWABLE ORAL EVERY 4 HOURS
Refills: 0 | Status: DISCONTINUED | OUTPATIENT
Start: 2021-07-10 | End: 2021-07-10

## 2021-07-10 RX ORDER — OXYCODONE HYDROCHLORIDE 5 MG/1
5 TABLET ORAL ONCE
Refills: 0 | Status: DISCONTINUED | OUTPATIENT
Start: 2021-07-10 | End: 2021-07-10

## 2021-07-10 RX ORDER — HYDROCORTISONE 1 %
1 OINTMENT (GRAM) TOPICAL EVERY 6 HOURS
Refills: 0 | Status: DISCONTINUED | OUTPATIENT
Start: 2021-07-10 | End: 2021-07-10

## 2021-07-10 RX ORDER — OXYTOCIN 10 UNIT/ML
333.33 VIAL (ML) INJECTION
Qty: 20 | Refills: 0 | Status: DISCONTINUED | OUTPATIENT
Start: 2021-07-10 | End: 2021-07-10

## 2021-07-10 RX ORDER — OXYCODONE HYDROCHLORIDE 5 MG/1
5 TABLET ORAL
Refills: 0 | Status: DISCONTINUED | OUTPATIENT
Start: 2021-07-10 | End: 2021-07-10

## 2021-07-10 RX ORDER — ACETAMINOPHEN 500 MG
3 TABLET ORAL
Qty: 0 | Refills: 0 | DISCHARGE
Start: 2021-07-10

## 2021-07-10 RX ORDER — SODIUM CHLORIDE 9 MG/ML
3 INJECTION INTRAMUSCULAR; INTRAVENOUS; SUBCUTANEOUS EVERY 8 HOURS
Refills: 0 | Status: DISCONTINUED | OUTPATIENT
Start: 2021-07-10 | End: 2021-07-10

## 2021-07-10 RX ORDER — LANOLIN
1 OINTMENT (GRAM) TOPICAL EVERY 6 HOURS
Refills: 0 | Status: DISCONTINUED | OUTPATIENT
Start: 2021-07-10 | End: 2021-07-10

## 2021-07-10 RX ORDER — ACETAMINOPHEN 500 MG
975 TABLET ORAL
Refills: 0 | Status: DISCONTINUED | OUTPATIENT
Start: 2021-07-10 | End: 2021-07-10

## 2021-07-10 RX ADMIN — Medication 30 MILLIGRAM(S): at 05:30

## 2021-07-10 RX ADMIN — Medication 30 MILLIGRAM(S): at 07:06

## 2021-07-10 NOTE — PROGRESS NOTE ADULT - REASON FOR ADMISSION
PTL
termination at 20 wks 3 days for fetal abnromalities ( skeletal mainly)
termination of pregnancy
20 wks 3 days with skeletal anomalies for termination

## 2021-07-10 NOTE — PROGRESS NOTE ADULT - SUBJECTIVE AND OBJECTIVE BOX
Patient re examined at approximately 6:15pm after 3rd  misoprostel.l. cervix without much change; still 1cm lont.  Will place laminaria and cytotec and re evaluate in 3 -4 hours. patient needed to have epidural redone and now is comfortable.    patient placed in lithotomy position at approximately 6;30pm and sterile speculum placed. rt lower vaginal wall cyst noted and using witde deavers, cervix visualized and single tooth tenaculum placed on anterior lip. laminaria 5 # 4 placed followed by misoprostel ( 4 x 100mg) and 2 sterile ratex gauze. all instruments removed and patient tolerated the procedure well. will re check patient in 3-4 hours or prn
Progress Note    Patient seen and examined at bedside. Comfortable, denies complaints.      T(C): 36.8 (07-09-21 @ 06:46), Max: 36.8 (07-09-21 @ 06:46)  HR: 70 (07-09-21 @ 06:50) (70 - 70)  BP: 109/65 (07-09-21 @ 06:50) (109/65 - 109/65)  RR: 18 (07-09-21 @ 06:46) (18 - 18)     SVE: L/C/P 40mcg cytotec placed in posterior vaginal fornix without complication  Patient tolerated procedure well    Meds: ([place])   4 Each &lt;See Task&gt; (07-09-21 @ 08:03)        Labs:              A/P: 31y  G5 P 2012 @ 20.2 week fetus with skeletal anomalies for cytotec termination of pregnancy  - continue to  monitor  - analgesia prn  - Dr Lundberg aware
Progress Note    Patient seen and examined at bedside. Comfortable, denies complaints.  Epidural in place @ 10:45 AM      T(C): 36.8 (21 @ 06:46), Max: 36.8 (21 @ 06:46)  HR: 71 (21 @ 11:15) (63 - 76)  BP: 97/57 (21 @ 11:15) (96/54 - 109/65)  RR: 18 (21 @ 06:46) (18 - 18)  SpO2: 100% (21 @ 10:55) (100% - 100%)    SVE: soft closed 400mcg cytotec placed in posterior vaginal fornix without complication    Meds: ([place])   1 Each &lt;See Task&gt; (21 @ 10:36)    ([place])   4 Each &lt;See Task&gt; (21 @ 08:03)    misoprostol   400 MICROGram(s) Vaginal (21 @ 08:10)   400 MICROGram(s) Vaginal (21 @ 11:20)        Labs:                        12.7   13.39 )-----------( 194      ( 2021 07:45 )             38.2               A/P: 31y   @ 20.3 weeks fetal skeletal anomalies for cytotec termination of pregnancy  - continue current care  - analgesia prn  - Dr Lundberg aware  - cytotec 400 mcg Q 3 hours pv
Laminaria and sponges removed around 1 am and pitocin started shortly afterward.  Patient was complaining of increased pressure and pain despite epidural with good level and pain relief prior.  Gestational sac with fetus and placenta found between legs outside of vagina and appeared intact at 2:17am. no bleeding noted.  Sac opened so that parents could see baby.  Will take portions of cord and placenta for microarray testing as planned and as recommended by mfm and genetic counselor.  Baby to go for autopsy as discussed and as agreed to by parents.    Will monitor patient for 6-8 hours and consider discharge home if stable with instructions for post procedure care.  
Cytotec # 2 note    Patient seen and examined at bedside. Comfortable, denies complaints.   Epidural in place     T(C): 36.8 (21 @ 06:46), Max: 36.8 (21 @ 06:46)  HR: 68 (21 @ 14:35) (62 - 107)  BP: 101/50 (21 @ 14:35) (92/53 - 117/52)  RR: 18 (21 @ 06:46) (18 - 18)  SpO2: 100% (21 @ 10:55) (100% - 100%)    SVE: finger tip+ bloody show  Speculum exam: 400 mcg misprostol  placed in posterior vaginal fornix by Dr Lundberg    Meds: ([place])   1 Each &lt;See Task&gt; (21 @ 10:36)    ([place])   4 Each &lt;See Task&gt; (21 @ 13:59)    LORazepam     Tablet   1 milliGRAM(s) Oral (21 @ 14:44)    misoprostol   400 MICROGram(s) Vaginal (21 @ 08:10)   400 MICROGram(s) Vaginal (21 @ 11:20)   400 MICROGram(s) Vaginal (21 @ 14:10)        Labs:                        12.7   13.39 )-----------( 194      ( 2021 07:45 )             38.2         Urinalysis Basic - ( 2021 14:10 )    Color: Yellow / Appearance: Slightly Turbid / S.031 / pH: x  Gluc: x / Ketone: Negative  / Bili: Negative / Urobili: 3 mg/dL   Blood: x / Protein: 30 mg/dL / Nitrite: Negative   Leuk Esterase: Negative / RBC: x / WBC x   Sq Epi: x / Non Sq Epi: x / Bacteria: x    A/P: 31y   @  20.3 weeks, fetal skeletal dysplasia for cytotec TOP  - continue current care  - analgesia prn  - IV hydration  - will reevaluate X 3 hours or prn  - Dr Lundberg present  
PGY3 Note    Patient seen at bedside for evaluation. No complaints.     T(F): 98.2 (21 @ 06:46), Max: 98.2 (21 @ 06:46)  HR: 72 (21 @ 18:16) (61 - 107)  BP: 101/53 (21 @ 18:05) (92/53 - 117/52)  RR: 18 (21 @ 06:46) (18 - 18)  SpO2: 100% (21 @ 18:06) (99% - 100%)    [place]: q2min  SVE: FT/L/-3  Speculum: cervix grasped with tenaculum, 5 laminaria placed with 2 sponges. 400mcg cytotec placed    Medications:  cytotec 400mcg @0800, @1100, @1410, @1850  epi placed @1045, redone @1750      Labs:                        12.7   13.39 )-----------( 194      ( 2021 07:45 )             38.2           ABO RH Interpretation: O POS (21 @ 07:52)    Antibody Screen: NEG (21 @ 07:52)    Urinalysis Basic - ( 2021 14:10 )    Color: Yellow / Appearance: Slightly Turbid / S.031 / pH: x  Gluc: x / Ketone: Negative  / Bili: Negative / Urobili: 3 mg/dL   Blood: x / Protein: 30 mg/dL / Nitrite: Negative   Leuk Esterase: Negative / RBC: 2 /HPF / WBC 5 /HPF   Sq Epi: x / Non Sq Epi: 12 /HPF / Bacteria: Negative      L&amp;D Drug Screen, Urine: Done (21 @ 14:10)    Prenatal Syphilis Test: Nonreact (21 @ 07:45)

## 2021-07-10 NOTE — DISCHARGE NOTE OB - CARE PROVIDER_API CALL
Gregory Lundberg  Obstetrics and Gynecology  [address] [place] [address]  Phone: [phone]  Fax: [phone]  Follow Up Time:

## 2021-07-10 NOTE — DISCHARGE NOTE OB - PATIENT PORTAL LINK FT
You can access the FollowMyHealth Patient Portal offered by [facility] by registering at the following website: [email or web address]. By joining [facility]’s FollowMyHealth portal, you will also be able to view your health information using other applications (apps) compatible with our system.

## 2021-07-10 NOTE — DISCHARGE NOTE OB - CARE PLAN
Principal Discharge DX:	Vaginal delivery  Goal:	healthy patient  Assessment and plan of treatment:	No heavy lifting.   Nothing inside the vagina for 6 weeks: no tampons, douching, sexual intercourse, tub baths or pools.   If you have a fever over 100.4F, severe abdominal pain or heavy vaginal bleeding please call your doctor or go to the emergency room.  Please follow up with your OBGYN in 6 weeks.

## 2021-07-10 NOTE — DISCHARGE NOTE OB - HOSPITAL COURSE
Patient admitted to L&D for termination of pregnancy secondary to fetal anomalies. Underwent induction with cytotec and laminaria, had uncomplicated vaginal delivery and was discharged on POD#0.

## 2021-07-10 NOTE — DISCHARGE NOTE OB - PLAN OF CARE
healthy patient No heavy lifting.   Nothing inside the vagina for 6 weeks: no tampons, douching, sexual intercourse, tub baths or pools.   If you have a fever over 100.4F, severe abdominal pain or heavy vaginal bleeding please call your doctor or go to the emergency room.  Please follow up with your OBGYN in 6 weeks.

## 2021-07-12 ENCOUNTER — APPOINTMENT (OUTPATIENT)
Dept: ANTEPARTUM | Facility: CLINIC | Age: 31
End: 2021-07-12

## 2021-07-12 ENCOUNTER — OUTPATIENT (OUTPATIENT)
Dept: OUTPATIENT SERVICES | Facility: HOSPITAL | Age: 31
LOS: 1 days | Discharge: HOME | End: 2021-07-12

## 2021-07-12 LAB
AT III ACT/NOR PPP CHRO: 100 % — SIGNIFICANT CHANGE UP (ref 85–135)
B2 GLYCOPROT1 AB SER QL: NEGATIVE — SIGNIFICANT CHANGE UP
CARDIOLIPIN AB SER-ACNC: NEGATIVE — SIGNIFICANT CHANGE UP

## 2021-07-14 LAB
B19V IGG SER-ACNC: 0.33 INDEX — SIGNIFICANT CHANGE UP (ref 0–0.9)
B19V IGG+IGM SER-IMP: NEGATIVE — SIGNIFICANT CHANGE UP
B19V IGG+IGM SER-IMP: SIGNIFICANT CHANGE UP
B19V IGM FLD-ACNC: 0.08 INDEX — SIGNIFICANT CHANGE UP (ref 0–0.9)
B19V IGM SER-ACNC: NEGATIVE — SIGNIFICANT CHANGE UP
DNA PLOIDY SPEC FC-IMP: SIGNIFICANT CHANGE UP
PTR INTERPRETATION: SIGNIFICANT CHANGE UP
SURGICAL PATHOLOGY STUDY: SIGNIFICANT CHANGE UP

## 2021-07-15 ENCOUNTER — NON-APPOINTMENT (OUTPATIENT)
Age: 31
End: 2021-07-15

## 2021-07-15 DIAGNOSIS — Z33.2 ENCOUNTER FOR ELECTIVE TERMINATION OF PREGNANCY: ICD-10-CM

## 2021-07-15 LAB
DRVVT SCREEN TO CONFIRM RATIO: SIGNIFICANT CHANGE UP
LA NT DPL PPP QL: SIGNIFICANT CHANGE UP
NORMALIZED SCT PPP-RTO: 0.76 RATIO — SIGNIFICANT CHANGE UP (ref 0–1.16)
NORMALIZED SCT PPP-RTO: SIGNIFICANT CHANGE UP
PROT C ACT/NOR PPP: 108 % — SIGNIFICANT CHANGE UP (ref 65–129)

## 2021-07-16 NOTE — HISTORY OF PRESENT ILLNESS
[FreeTextEntry1] : The visit was provided via telephone. The patient, Nishi Javier, was located at her current residing residence  in [place], [place] at the time of the visit. The provider, Kerri Coe (genetic counselor) participated in the telehealth encounter. The genetic counselor was located at the medical office at [facility] in [place], [place] at the time of the appointment. Verbal consent for telehealth services was given by the patient. The patient was unaccompanied.\par \par 2021\par \par Dr. Gregory Lundberg\par \par Re: NISHI JAVIER, : Mar 23 1990\par \par Dear Dr. 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 The patient was not pregnant at the time of the consultation. The patient was unaccompanied during the time of the visit, however, we communicated via email after the conclusion of the visit and the patient included her partner in the email. \par \par RELEVANT MEDICAL AND SURGICAL HISTORY:\par • D&C\par \par PREGNANCY HISTORY:\par At her session which she attended alone, this patient stated she was pregnant a total of 5 times. \par Pregnancy #1: Healthy 4-year-old boy.\par Pregnancy #2: Termination. Ultrasound on 2018 indicated short and bowed left and right femur and forearm, short left and right lower leg, severe micromelia and thoracic circumference <10%. Chromosomes were consistent with a normal female karyotype 46,XX.\par Pregnancy #3: Healthy 1-year-old boy.\par Pregnancy #4: Miscarriage at approximately 6 weeks gestation for unknown reasons.\par Pregnancy #5: Termination during 20th week of gestation. Ultrasound on 2021 indicated multiple skeletal abnormalities including, short long bones, bowed long bones of lower extremity, polydactyly of right hand, elevated cardiothoracic ratio (0.594), hyperextended neck, large right side of the heart and possible bell shaped chest.\par \par The patient was initially scheduled for genetic counseling during her previous pregnancy, however, she opted for termination. Testing was coordinated between myself and the patient's doctor and associates to test the products of conception for chromosome analysis, microarray and a skeletal dysplasia panel. Enough sample was saved in order to possibly perform whole exome sequencing if the first tests are negative. Ms. Javier was informed of the tests which had been ordered and the function of each test.\par \par We discussed that if the genetic cause of the abnormalities is discovered, reproductive options such as prenatal testing and assisted reproduction including preimplantation genetic diagnosis (PGD) may be performed to decrease the chance of a particular genetic condition occurring in their fetus. \par \par FAMILY HISTORY: \par This patient stated her partner's mother was dx with breast cancer (DCIS) at 48, father was dx with kidney cancer at 41 and colon cancer at 59 and his maternal grandfather was dx with colon cancer at 58 y and multiple squamous cell cancers. The patient was informed that while most cancer occurrences are sporadic in origin about 5-10% of cancer is inherited. We reviewed the availability of cancer genetic counseling and DNA testing, as per this patient’s partner's interest. DNA testing of individuals affected with cancer, when available, provides the most accurate cancer risk assessment for other family members. The patient's partner expressed interest in cancer genetic counseling and will reach out to schedule an appointment at his discretion. The patient was informed that IVF can also be performed for couples with a known hereditary cause behind the cancer in the family.\par \par The patient stated her sister-in-law underwent IVF to conceive due to her partner's oligospermia. The patient reported that her sister-in-law is a carrier of metachromatoc leukodystrophy. We discussed that if her sister-in-law carries the disorder, there is a 50% chance her partner carries the disorder. We discussed that carrier screening is recommended for her partner. The patient was interested in her partner being tested. The patient was also asked to try and attain a copy of her sister-in-law's report in order to verify that we are testing the correct genetic mutation.\par \par The family history is otherwise negative for birth defects, intellectual disability and consanguinity that would impact this pregnancy. There is a 3-5% background risk for any birth defect or developmental issue with every pregnancy.\par \par CARRIER SCREENING:\par The patient is of Spanish descent and her partner is of Spanish descent. Carrier screening for hemoglobinopathies/thalassemias (hemoglobin electrophoresis, MCV value) were negative, greatly reducing the risk of an affected pregnancy with any of these disorders. Cystic fibrosis, spinal muscular atrophy and fragile-X results were unavailable for viewing at the time of the session. The patient was offered universal carrier screening for 100+ genetic conditions (including metachromatic leukodystrophy) and opted to pursue testing as a couple through [facility] for 200+ genes. The patient completed a consent form and was sent a script. The patient's partner will be given the universal carrier screening consent form when he schedules to meet with me for cancer genetics.\par \par PLAN:\par • Results of the chromosome analysis, microarray and skeletal dysplasia panel are currently pending for the POC. Whole exome sequencing may be ordered if the first tests are negative. If a positive result returns, prenatal testing and IVF with PGD will be discussed.\par • Patient's partner will reach out for cancer genetic counseling. If a positive result returns, IVF with PGD may be a possible option. Metachromatic leukodystrophy carrier screening and universal carrier screening will also be discussed with him at the time of the cancer genetics appointment.\par • Patient completed a consent form for universal carrier screening and was sent a script.\par • We will reach back out to Ms. Javier once results return.\par \par If you have any additional questions, please feel free to call me at [phone] or [phone]. Thank you for referring this patient.\par \par Sincerely,\par \par Kerri Coe MS, [facility]\par Genetic Counselor

## 2021-07-19 ENCOUNTER — NON-APPOINTMENT (OUTPATIENT)
Age: 31
End: 2021-07-19

## 2021-07-27 ENCOUNTER — FORM ENCOUNTER (OUTPATIENT)
Age: 31
End: 2021-07-27

## 2021-08-01 ENCOUNTER — INPATIENT (INPATIENT)
Facility: HOSPITAL | Age: 31
LOS: 2 days | Discharge: HOME | End: 2021-08-04
Attending: FAMILY MEDICINE | Admitting: FAMILY MEDICINE
Payer: MEDICAID

## 2021-08-01 VITALS
WEIGHT: 169.09 LBS | TEMPERATURE: 102 F | HEIGHT: 69 IN | DIASTOLIC BLOOD PRESSURE: 68 MMHG | RESPIRATION RATE: 18 BRPM | HEART RATE: 92 BPM | SYSTOLIC BLOOD PRESSURE: 113 MMHG | OXYGEN SATURATION: 96 %

## 2021-08-01 DIAGNOSIS — U07.1 COVID-19: ICD-10-CM

## 2021-08-01 DIAGNOSIS — J96.01 ACUTE RESPIRATORY FAILURE WITH HYPOXIA: ICD-10-CM

## 2021-08-01 DIAGNOSIS — Z86.59 PERSONAL HISTORY OF OTHER MENTAL AND BEHAVIORAL DISORDERS: ICD-10-CM

## 2021-08-01 DIAGNOSIS — Z29.9 ENCOUNTER FOR PROPHYLACTIC MEASURES, UNSPECIFIED: ICD-10-CM

## 2021-08-01 DIAGNOSIS — N93.9 ABNORMAL UTERINE AND VAGINAL BLEEDING, UNSPECIFIED: ICD-10-CM

## 2021-08-01 LAB
ALBUMIN SERPL ELPH-MCNC: 3.5 G/DL — SIGNIFICANT CHANGE UP (ref 3.5–5.2)
ALP SERPL-CCNC: 124 U/L — HIGH (ref 30–115)
ALT FLD-CCNC: 23 U/L — SIGNIFICANT CHANGE UP (ref 0–41)
ANION GAP SERPL CALC-SCNC: 11 MMOL/L — SIGNIFICANT CHANGE UP (ref 7–14)
APTT BLD: 28.9 SEC — SIGNIFICANT CHANGE UP (ref 27–39.2)
AST SERPL-CCNC: 30 U/L — SIGNIFICANT CHANGE UP (ref 0–41)
BASOPHILS # BLD AUTO: 0.01 K/UL — SIGNIFICANT CHANGE UP (ref 0–0.2)
BASOPHILS NFR BLD AUTO: 0.3 % — SIGNIFICANT CHANGE UP (ref 0–1)
BILIRUB SERPL-MCNC: 0.2 MG/DL — SIGNIFICANT CHANGE UP (ref 0.2–1.2)
BUN SERPL-MCNC: 5 MG/DL — LOW (ref 10–20)
CALCIUM SERPL-MCNC: 8 MG/DL — LOW (ref 8.5–10.1)
CHLORIDE SERPL-SCNC: 102 MMOL/L — SIGNIFICANT CHANGE UP (ref 98–110)
CO2 SERPL-SCNC: 24 MMOL/L — SIGNIFICANT CHANGE UP (ref 17–32)
CREAT SERPL-MCNC: 0.6 MG/DL — LOW (ref 0.7–1.5)
CRP SERPL-MCNC: 58 MG/L — HIGH
D DIMER BLD IA.RAPID-MCNC: 254 NG/ML DDU — HIGH (ref 0–230)
EOSINOPHIL # BLD AUTO: 0.01 K/UL — SIGNIFICANT CHANGE UP (ref 0–0.7)
EOSINOPHIL NFR BLD AUTO: 0.3 % — SIGNIFICANT CHANGE UP (ref 0–8)
FERRITIN SERPL-MCNC: 132 NG/ML — SIGNIFICANT CHANGE UP (ref 15–150)
GLUCOSE SERPL-MCNC: 116 MG/DL — HIGH (ref 70–99)
HCG SERPL QL: NEGATIVE — SIGNIFICANT CHANGE UP
HCT VFR BLD CALC: 40.5 % — SIGNIFICANT CHANGE UP (ref 37–47)
HGB BLD-MCNC: 12.9 G/DL — SIGNIFICANT CHANGE UP (ref 12–16)
IMM GRANULOCYTES NFR BLD AUTO: 0.3 % — SIGNIFICANT CHANGE UP (ref 0.1–0.3)
INR BLD: 1.13 RATIO — SIGNIFICANT CHANGE UP (ref 0.65–1.3)
LACTATE SERPL-SCNC: 1 MMOL/L — SIGNIFICANT CHANGE UP (ref 0.7–2)
LYMPHOCYTES # BLD AUTO: 0.42 K/UL — LOW (ref 1.2–3.4)
LYMPHOCYTES # BLD AUTO: 11.8 % — LOW (ref 20.5–51.1)
MCHC RBC-ENTMCNC: 30.5 PG — SIGNIFICANT CHANGE UP (ref 27–31)
MCHC RBC-ENTMCNC: 31.9 G/DL — LOW (ref 32–37)
MCV RBC AUTO: 95.7 FL — SIGNIFICANT CHANGE UP (ref 81–99)
MONOCYTES # BLD AUTO: 0.11 K/UL — SIGNIFICANT CHANGE UP (ref 0.1–0.6)
MONOCYTES NFR BLD AUTO: 3.1 % — SIGNIFICANT CHANGE UP (ref 1.7–9.3)
NEUTROPHILS # BLD AUTO: 3.01 K/UL — SIGNIFICANT CHANGE UP (ref 1.4–6.5)
NEUTROPHILS NFR BLD AUTO: 84.2 % — HIGH (ref 42.2–75.2)
NRBC # BLD: 0 /100 WBCS — SIGNIFICANT CHANGE UP (ref 0–0)
NT-PROBNP SERPL-SCNC: 88 PG/ML — SIGNIFICANT CHANGE UP (ref 0–300)
PLATELET # BLD AUTO: 120 K/UL — LOW (ref 130–400)
POTASSIUM SERPL-MCNC: 4.3 MMOL/L — SIGNIFICANT CHANGE UP (ref 3.5–5)
POTASSIUM SERPL-SCNC: 4.3 MMOL/L — SIGNIFICANT CHANGE UP (ref 3.5–5)
PROCALCITONIN SERPL-MCNC: 0.09 NG/ML — SIGNIFICANT CHANGE UP (ref 0.02–0.1)
PROT SERPL-MCNC: 5.8 G/DL — LOW (ref 6–8)
PROTHROM AB SERPL-ACNC: 13 SEC — HIGH (ref 9.95–12.87)
RBC # BLD: 4.23 M/UL — SIGNIFICANT CHANGE UP (ref 4.2–5.4)
RBC # FLD: 11.7 % — SIGNIFICANT CHANGE UP (ref 11.5–14.5)
SARS-COV-2 RNA SPEC QL NAA+PROBE: DETECTED
SODIUM SERPL-SCNC: 137 MMOL/L — SIGNIFICANT CHANGE UP (ref 135–146)
TROPONIN T SERPL-MCNC: <0.01 NG/ML — SIGNIFICANT CHANGE UP
WBC # BLD: 3.57 K/UL — LOW (ref 4.8–10.8)
WBC # FLD AUTO: 3.57 K/UL — LOW (ref 4.8–10.8)

## 2021-08-01 PROCEDURE — 71045 X-RAY EXAM CHEST 1 VIEW: CPT | Mod: 26

## 2021-08-01 PROCEDURE — 93010 ELECTROCARDIOGRAM REPORT: CPT

## 2021-08-01 PROCEDURE — 99223 1ST HOSP IP/OBS HIGH 75: CPT

## 2021-08-01 PROCEDURE — 99285 EMERGENCY DEPT VISIT HI MDM: CPT

## 2021-08-01 RX ORDER — IBUPROFEN 200 MG
600 TABLET ORAL EVERY 6 HOURS
Refills: 0 | Status: DISCONTINUED | OUTPATIENT
Start: 2021-08-01 | End: 2021-08-04

## 2021-08-01 RX ORDER — ONDANSETRON 8 MG/1
4 TABLET, FILM COATED ORAL ONCE
Refills: 0 | Status: COMPLETED | OUTPATIENT
Start: 2021-08-01 | End: 2021-08-01

## 2021-08-01 RX ORDER — ALBUTEROL 90 UG/1
2 AEROSOL, METERED ORAL EVERY 4 HOURS
Refills: 0 | Status: DISCONTINUED | OUTPATIENT
Start: 2021-08-01 | End: 2021-08-04

## 2021-08-01 RX ORDER — IBUPROFEN 200 MG
400 TABLET ORAL EVERY 6 HOURS
Refills: 0 | Status: DISCONTINUED | OUTPATIENT
Start: 2021-08-01 | End: 2021-08-01

## 2021-08-01 RX ORDER — ESCITALOPRAM OXALATE 10 MG/1
20 TABLET, FILM COATED ORAL DAILY
Refills: 0 | Status: DISCONTINUED | OUTPATIENT
Start: 2021-08-01 | End: 2021-08-04

## 2021-08-01 RX ORDER — ENOXAPARIN SODIUM 100 MG/ML
40 INJECTION SUBCUTANEOUS DAILY
Refills: 0 | Status: DISCONTINUED | OUTPATIENT
Start: 2021-08-01 | End: 2021-08-04

## 2021-08-01 RX ORDER — DEXAMETHASONE 0.5 MG/5ML
6 ELIXIR ORAL DAILY
Refills: 0 | Status: DISCONTINUED | OUTPATIENT
Start: 2021-08-02 | End: 2021-08-04

## 2021-08-01 RX ORDER — ACETAMINOPHEN 500 MG
650 TABLET ORAL ONCE
Refills: 0 | Status: COMPLETED | OUTPATIENT
Start: 2021-08-01 | End: 2021-08-01

## 2021-08-01 RX ORDER — SODIUM CHLORIDE 9 MG/ML
1000 INJECTION, SOLUTION INTRAVENOUS ONCE
Refills: 0 | Status: COMPLETED | OUTPATIENT
Start: 2021-08-01 | End: 2021-08-01

## 2021-08-01 RX ORDER — LANOLIN ALCOHOL/MO/W.PET/CERES
5 CREAM (GRAM) TOPICAL AT BEDTIME
Refills: 0 | Status: DISCONTINUED | OUTPATIENT
Start: 2021-08-01 | End: 2021-08-04

## 2021-08-01 RX ORDER — ONDANSETRON 8 MG/1
4 TABLET, FILM COATED ORAL EVERY 6 HOURS
Refills: 0 | Status: DISCONTINUED | OUTPATIENT
Start: 2021-08-01 | End: 2021-08-04

## 2021-08-01 RX ORDER — GUAIFENESIN/DEXTROMETHORPHAN 600MG-30MG
10 TABLET, EXTENDED RELEASE 12 HR ORAL EVERY 4 HOURS
Refills: 0 | Status: DISCONTINUED | OUTPATIENT
Start: 2021-08-01 | End: 2021-08-04

## 2021-08-01 RX ORDER — DEXAMETHASONE 0.5 MG/5ML
6 ELIXIR ORAL ONCE
Refills: 0 | Status: COMPLETED | OUTPATIENT
Start: 2021-08-01 | End: 2021-08-01

## 2021-08-01 RX ORDER — ACETAMINOPHEN 500 MG
650 TABLET ORAL EVERY 4 HOURS
Refills: 0 | Status: DISCONTINUED | OUTPATIENT
Start: 2021-08-01 | End: 2021-08-04

## 2021-08-01 RX ADMIN — ONDANSETRON 4 MILLIGRAM(S): 8 TABLET, FILM COATED ORAL at 10:44

## 2021-08-01 RX ADMIN — Medication 650 MILLIGRAM(S): at 10:47

## 2021-08-01 RX ADMIN — SODIUM CHLORIDE 1000 MILLILITER(S): 9 INJECTION, SOLUTION INTRAVENOUS at 10:47

## 2021-08-01 RX ADMIN — Medication 5 MILLIGRAM(S): at 21:36

## 2021-08-01 RX ADMIN — ENOXAPARIN SODIUM 40 MILLIGRAM(S): 100 INJECTION SUBCUTANEOUS at 13:57

## 2021-08-01 RX ADMIN — ESCITALOPRAM OXALATE 20 MILLIGRAM(S): 10 TABLET, FILM COATED ORAL at 13:55

## 2021-08-01 RX ADMIN — Medication 600 MILLIGRAM(S): at 13:55

## 2021-08-01 RX ADMIN — ONDANSETRON 4 MILLIGRAM(S): 8 TABLET, FILM COATED ORAL at 21:14

## 2021-08-01 RX ADMIN — Medication 10 MILLILITER(S): at 23:41

## 2021-08-01 RX ADMIN — Medication 650 MILLIGRAM(S): at 23:41

## 2021-08-01 RX ADMIN — Medication 650 MILLIGRAM(S): at 23:51

## 2021-08-01 RX ADMIN — Medication 6 MILLIGRAM(S): at 10:42

## 2021-08-01 NOTE — ED PROVIDER NOTE - ATTENDING CONTRIBUTION TO CARE
Pt diagnosed with COVID one week ago. Now presents with shortness of breath. On exam no distress, bibasilar crackles. S1S2 rrr, no murmur, ext neg for edema, neuro intact.   IMP: COVID pneumonia.

## 2021-08-01 NOTE — H&P ADULT - NSHPREVIEWOFSYSTEMS_GEN_ALL_CORE
REVIEW OF SYSTEMS:    CONSTITUTIONAL: see HPI  EYES/ENT: No visual changes;  No vertigo or throat pain   NECK: No pain or stiffness  RESPIRATORY: see HPi  CARDIOVASCULAR: No chest pain or palpitations  GASTROINTESTINAL: see HPI  GENITOURINARY: No dysuria, frequency or hematuria  NEUROLOGICAL: No numbness or weakness  SKIN: No itching, rashes

## 2021-08-01 NOTE — H&P ADULT - NSHPLABSRESULTS_GEN_ALL_CORE
12.9   3.57  )-----------( 120      ( 01 Aug 2021 11:03 )             40.5       08-01    137  |  102  |  5<L>  ----------------------------<  116<H>  4.3   |  24  |  0.6<L>    Ca    8.0<L>      01 Aug 2021 11:03    TPro  5.8<L>  /  Alb  3.5  /  TBili  0.2  /  DBili  x   /  AST  30  /  ALT  23  /  AlkPhos  124<H>  08-01                  PT/INR - ( 01 Aug 2021 11:03 )   PT: 13.00 sec;   INR: 1.13 ratio         PTT - ( 01 Aug 2021 11:03 )  PTT:28.9 sec    Lactate Trend  08-01 @ 11:03 Lactate:1.0       CARDIAC MARKERS ( 01 Aug 2021 11:03 )  x     / <0.01 ng/mL / x     / x     / x            CAPILLARY BLOOD GLUCOSE        < from: Xray Chest 1 View-PORTABLE IMMEDIATE (08.01.21 @ 10:34) >    Impression:    Patchy, peripheral bilateral pulmonary opacities with lower lobe predominance.        --- End of Report ---      BLAKE MOREL MD; Attending Radiologist  This document has been electronically signed. Aug  1 2021 10:37AM

## 2021-08-01 NOTE — H&P ADULT - NSHPPHYSICALEXAM_GEN_ALL_CORE
Patient seen at bedside   30y/o Female NAD, resting comfortably.  PSYCH: AAOx3, cooperative, appropriate    Remainder of exam Deferred to ED Provider Physical exam due to COVID 19 infection.

## 2021-08-01 NOTE — H&P ADULT - HISTORY OF PRESENT ILLNESS
31 year old Female  w PMhx recent discontinuation of pregnancy at 20weeks for fetal abnormalities,  presenting c/o fever, cramping abdominal pain, and vaginal bleeding x 1week. Pt was also tested outpatient for covid and was POSITIVE.  Pt has had fever tmax 104, nausea, vomiting, diarrhea, cough, chest pain and sob on deep inspiration. Pt states has cramping intermittent abdominal pain, intermittent vaginal bleeding, and lower back pain at epidural injection site.    Came in today due to worsening sob. sts entire fam is covid + pt is unvaccinated. No recent travel, leg pain/swelling, hx of dvt/pe, hormone therapy. 31 year old Female  with recent discontinuation of pregnancy at 20 weeks for fetal abnormalities,  presenting c/o fever, cramping abdominal pain, and vaginal bleeding x 1week. Pt was also tested outpatient for covid and was POSITIVE.  Pt has had fever tmax 104, nausea, vomiting, diarrhea, cough, chest pain and sob on deep inspiration. Pt states has cramping intermittent abdominal pain, intermittent vaginal bleeding, and lower back pain at epidural injection site.    Came in today due to worsening sob. sts entire fam is covid + pt is unvaccinated. No recent travel, leg pain/swelling, hx of dvt/pe, hormone therapy.

## 2021-08-01 NOTE — ED ADULT TRIAGE NOTE - CHIEF COMPLAINT QUOTE
"I was diagnosed with COVID last Saturday. I can't get rid of this fever. My chest feels like it's getting tight." Pt medicated with Musinex with Tylenol at 7 a.m. with no relief.

## 2021-08-01 NOTE — ED PROVIDER NOTE - NS ED ROS FT
Constitutional: + fever, chills, weakness  Eyes: no redness/discharge/pain/vision changes  ENT: + sore throat  Cardiac: + chest pain, SOB   Respiratory: + cough. No respiratory distress  GI: + n/v/d. no abd pain  : No dysuria, frequency, urgency or hematuria  MS: no pain to back or extremities, no loss of ROM, no weakness  Neuro: No headache or weakness. No LOC.  Skin: No skin rash.  Endocrine: No history of thyroid disease or diabetes.  Except as documented in the HPI, all other systems are negative.

## 2021-08-01 NOTE — H&P ADULT - PROBLEM SELECTOR PLAN 1
- Admit patient to [facility]  - Infectious Dz consult  - Repeat labs, Procalcitonin, CRP, CBC, D-Dimer, Ferritin, Mg  - IV Dexamethasone   - IV Abx  - Isolation precautions  - Oxygen prn dyspnea  - Ox Sat q shift  - PFT's q shift  - Monitor V/S - Admit patient to [facility]  - ID consult  - Repeat labs, Procalcitonin, CRP, CBC, D-Dimer, Ferritin, Mg  - IV Dexamethasone   - patient would not be candidate for RDV, plasma given duration of onset   - will hold off on antibiotics for now   - chest xray - bilateral opacities noted   - Isolation precautions  - currently on 2L supplemental O2 - taper as tolerated

## 2021-08-01 NOTE — H&P ADULT - PROBLEM SELECTOR PLAN 3
- continue Lexapro - intermittent bleeding and abdominal pain for last 3 weeks since procedure   - denies any pus or foul smelling discharge   - Abdominal U/S  - Gyn Consult  - discuss with patient about possible infection and antibiotics - she would like to hold off on antibiotics until GYN eval at [place]

## 2021-08-01 NOTE — ED PROVIDER NOTE - OBJECTIVE STATEMENT
31 year old F hx of anxiety, recently tested covid + 07/24 presenting c/o fever/sob. sts has had fever tmax 104, sore throat, nausea, vomiting, diarrhea, cough, chest pain and sob. Came in today due to worsening sob. sts entire fam is covid + pt is unvaccinated. No recent travel, leg pain/swelling, hx of dvt/pe, hormone therapy, abd/flank pain.

## 2021-08-01 NOTE — H&P ADULT - NSICDXFAMILYHX_GEN_ALL_CORE_FT
FAMILY HISTORY:  Grandparent  Still living? Unknown  Family history of breast cancer, Age at diagnosis: Age Unknown

## 2021-08-01 NOTE — ED PROVIDER NOTE - PHYSICAL EXAMINATION
CONSTITUTIONAL: Well-appearing; well-nourished; in no apparent distress.   EYES: PERRL; EOM intact.   ENT: normal nose; no rhinorrhea; normal pharynx with no tonsillar hypertrophy.   NECK: Supple; non-tender; no cervical lymphadenopathy  CARDIOVASCULAR: +tachycardia; no murmurs, rubs, or gallops.   RESPIRATORY: O2 95% at rest on RA, O2 83 % with ambulation. Normal chest excursion with respiration; breath sounds clear and equal bilaterally; no wheezes, rhonchi, or rales.  GI/: Normal bowel sounds; non-distended; non-tender; no palpable organomegaly.   MS: No evidence of trauma or deformity.  Normal ROM in all four extremities; non-tender to palpation; distal pulses are normal.   Extrem: no peripheral edema. no calf ttp  SKIN: Normal for age and race; warm; dry; good turgor; no apparent lesions or exudate.   NEURO/PSYCH: A & O x 4; grossly unremarkable. mood and manner are appropriate.

## 2021-08-01 NOTE — H&P ADULT - PROBLEM SELECTOR PLAN 5
IMPROVE VTE Individual Risk Assessment          RISK                                                          Points  [  ] Previous VTE                                                3  [  ] Thrombophilia                                             2  [  ] Lower limb paralysis                                   2        (unable to hold up >15 seconds)    [  ] Current Cancer                                             2         (within 6 months)  [ x ] Immobilization > 24 hrs                              1  [  ] ICU/CCU stay > 24 hours                             1  [  ] Age > 60                                                         1    IMPROVE VTE Score: 1  lovenox 40 daily

## 2021-08-02 LAB
COVID-19 SPIKE DOMAIN AB INTERP: NEGATIVE — SIGNIFICANT CHANGE UP
COVID-19 SPIKE DOMAIN ANTIBODY RESULT: 0.4 U/ML — SIGNIFICANT CHANGE UP
SARS-COV-2 IGG+IGM SERPL QL IA: 0.4 U/ML — SIGNIFICANT CHANGE UP
SARS-COV-2 IGG+IGM SERPL QL IA: NEGATIVE — SIGNIFICANT CHANGE UP
TROPONIN T SERPL-MCNC: <0.01 NG/ML — SIGNIFICANT CHANGE UP

## 2021-08-02 PROCEDURE — 71045 X-RAY EXAM CHEST 1 VIEW: CPT | Mod: 26

## 2021-08-02 PROCEDURE — 93010 ELECTROCARDIOGRAM REPORT: CPT

## 2021-08-02 PROCEDURE — 99233 SBSQ HOSP IP/OBS HIGH 50: CPT

## 2021-08-02 RX ORDER — CHOLECALCIFEROL (VITAMIN D3) 125 MCG
1000 CAPSULE ORAL DAILY
Refills: 0 | Status: DISCONTINUED | OUTPATIENT
Start: 2021-08-02 | End: 2021-08-04

## 2021-08-02 RX ORDER — IBUPROFEN 200 MG
600 TABLET ORAL ONCE
Refills: 0 | Status: DISCONTINUED | OUTPATIENT
Start: 2021-08-02 | End: 2021-08-02

## 2021-08-02 RX ORDER — TRAMADOL HYDROCHLORIDE 50 MG/1
50 TABLET ORAL ONCE
Refills: 0 | Status: DISCONTINUED | OUTPATIENT
Start: 2021-08-02 | End: 2021-08-02

## 2021-08-02 RX ORDER — ASCORBIC ACID 60 MG
500 TABLET,CHEWABLE ORAL DAILY
Refills: 0 | Status: DISCONTINUED | OUTPATIENT
Start: 2021-08-02 | End: 2021-08-04

## 2021-08-02 RX ADMIN — TRAMADOL HYDROCHLORIDE 50 MILLIGRAM(S): 50 TABLET ORAL at 23:18

## 2021-08-02 RX ADMIN — TRAMADOL HYDROCHLORIDE 50 MILLIGRAM(S): 50 TABLET ORAL at 23:48

## 2021-08-02 RX ADMIN — Medication 650 MILLIGRAM(S): at 17:26

## 2021-08-02 RX ADMIN — Medication 600 MILLIGRAM(S): at 00:31

## 2021-08-02 RX ADMIN — TRAMADOL HYDROCHLORIDE 50 MILLIGRAM(S): 50 TABLET ORAL at 02:33

## 2021-08-02 RX ADMIN — Medication 600 MILLIGRAM(S): at 19:46

## 2021-08-02 RX ADMIN — Medication 600 MILLIGRAM(S): at 20:16

## 2021-08-02 RX ADMIN — Medication 5 MILLIGRAM(S): at 22:10

## 2021-08-02 RX ADMIN — ESCITALOPRAM OXALATE 20 MILLIGRAM(S): 10 TABLET, FILM COATED ORAL at 11:54

## 2021-08-02 RX ADMIN — Medication 10 MILLILITER(S): at 19:46

## 2021-08-02 RX ADMIN — Medication 1000 UNIT(S): at 22:10

## 2021-08-02 RX ADMIN — Medication 6 MILLIGRAM(S): at 05:20

## 2021-08-02 RX ADMIN — Medication 500 MILLIGRAM(S): at 22:10

## 2021-08-02 RX ADMIN — TRAMADOL HYDROCHLORIDE 50 MILLIGRAM(S): 50 TABLET ORAL at 03:03

## 2021-08-02 RX ADMIN — ENOXAPARIN SODIUM 40 MILLIGRAM(S): 100 INJECTION SUBCUTANEOUS at 11:55

## 2021-08-02 RX ADMIN — Medication 600 MILLIGRAM(S): at 01:01

## 2021-08-02 NOTE — CONSULT NOTE ADULT - ASSESSMENT
· Assessment	  32 y/o  - female w/ PMHx of anxiety presents to ED for COVID 19 / Abd pain & Vaginal bleeding    IMPRESSION;  COVID 19 with severe illness. SpO2 < 94% on RA and need for supplemental O2.  Pt is inbetween the early viral replicative phase and the late immune mediated phase based on the onset of symptoms.   No significant elevation of the inflammatory markers   procalcitonin 0.09, not suggestive of a bacterial PNA.  Ferritin 132  CRP 58  Ddimers 254Doubt candidal vagingitis    RECOMMENDATIONS;  Target SpO2 92 % to 96 %  Day 1 : Single  mg IV loading dose  Day 2-5 : single  mg IV once daily maintenance dose  Daily CBC,CMP.  Dexamethasone 6 mg iv q24h for 10 days.  Monitor for side effects: hyperglycemia, neurological ( agitation/confusion), adrenal suppression, bacterial and fungal infections  F/o with OB/GYN

## 2021-08-02 NOTE — CONSULT NOTE ADULT - SUBJECTIVE AND OBJECTIVE BOX
FRANCE LLANOS  31y, Female  Allergy: No Known Allergies      All historical available data reviewed.    HPI:  31 year old Female  with recent discontinuation of pregnancy at 20 weeks for fetal abnormalities,  presenting c/o fever, cramping abdominal pain, and vaginal bleeding x 1week. Pt was also tested outpatient  for covid and was POSITIVE.  Pt has had fever tmax 104, nausea, vomiting, diarrhea, cough, chest pain and sob on deep inspiration. Pt states has cramping intermittent abdominal pain, intermittent vaginal bleeding, and lower back pain at epidural injection site.    Came in today due to worsening sob. sts entire fam is covid + pt is unvaccinated. No recent travel, leg pain/swelling, hx of dvt/pe, hormone therapy. (01 Aug 2021 12:44)    FAMILY HISTORY:  Family history of breast cancer (Grandparent)      PAST MEDICAL & SURGICAL HISTORY:  History of anxiety    Other   due  to fetal abnormalities x 2    No significant past surgical history          VITALS:  T(F): 98.6, Max: 98.9 (21 @ 17:16)  HR: 53  BP: 95/54  RR: 18Vital Signs Last 24 Hrs  T(C): 37 (02 Aug 2021 05:20), Max: 37.2 (01 Aug 2021 17:16)  T(F): 98.6 (02 Aug 2021 05:20), Max: 98.9 (01 Aug 2021 17:16)  HR: 53 (02 Aug 2021 05:20) (53 - 68)  BP: 95/54 (02 Aug 2021 05:20) (95/54 - 109/53)  BP(mean): --  RR: 18 (02 Aug 2021 08:00) (18 - 20)  SpO2: 93% (02 Aug 2021 08:00) (93% - 99%)    TESTS & MEASUREMENTS:                        12.9   3.57  )-----------( 120      ( 01 Aug 2021 11:03 )             40.5     08    137  |  102  |  5<L>  ----------------------------<  116<H>  4.3   |  24  |  0.6<L>    Ca    8.0<L>      01 Aug 2021 11:03    TPro  5.8<L>  /  Alb  3.5  /  TBili  0.2  /  DBili  x   /  AST  30  /  ALT  23  /  AlkPhos  124<H>      LIVER FUNCTIONS - ( 01 Aug 2021 11:03 )  Alb: 3.5 g/dL / Pro: 5.8 g/dL / ALK PHOS: 124 U/L / ALT: 23 U/L / AST: 30 U/L / GGT: x                   RADIOLOGY & ADDITIONAL TESTS:  Personal review of radiological diagnostics performed  Echo and EKG results noted when applicable.     MEDICATIONS:  acetaminophen   Tablet .. 650 milliGRAM(s) Oral every 4 hours PRN  ALBUTerol    90 MICROgram(s) HFA Inhaler 2 Puff(s) Inhalation every 4 hours PRN  dexAMETHasone  Injectable 6 milliGRAM(s) IV Push daily  enoxaparin Injectable 40 milliGRAM(s) SubCutaneous daily  escitalopram 20 milliGRAM(s) Oral daily  guaifenesin/dextromethorphan Oral Liquid 10 milliLiter(s) Oral every 4 hours PRN  ibuprofen  Tablet. 600 milliGRAM(s) Oral every 6 hours PRN  melatonin 5 milliGRAM(s) Oral at bedtime  ondansetron Injectable 4 milliGRAM(s) IV Push every 6 hours PRN      ANTIBIOTICS:

## 2021-08-02 NOTE — PROGRESS NOTE ADULT - ATTENDING COMMENTS
31 year old Female  with recent termination of pregnancy at 20 weeks for fetal abnormalities, and anxiety presented with fever, cramping abdominal pain, and vaginal bleeding x 1week and admitted for acute respiratory failure with hypoxia secondary to COVID-19 pneumonia.      #Acute respiratory failure with hypoxia  #Covid-19 pneumonia  - c/w supplemental O2 via 2L NC  - ID following, not candidate for Remdesivir due to timeframe from infection onset  - Inflammatory markers: ddimer 254, CRP 58, ferritin 132, procal 0.09, lactate 1.0  - c/w 10-day course of decadron (on day 1 of 10)  - hold abx for now  - c/w albuterol inhaler PRN  - c/w tylenol, ibuprofen, and zofran PRN for HA and nausea    /: 2L NC.  c/w Dexa.     #Vaginal bleeding, secondary to pregnancy termination 3 weeks prior to admission - improving  - expected per GYN  - continue to monitor symptoms and CBC  - denies pus or foul smelling discharge    #h/o anxiety  - c/w lexapro      # DVT prophylaxis: Lovenox  # GI prophylaxis: not indicated  # Diet: regular  # Activity Score (AM-PAC): IAT  # Code status: FULL  # Disposition: from home, floors for now

## 2021-08-02 NOTE — PROGRESS NOTE ADULT - SUBJECTIVE AND OBJECTIVE BOX
FRANCE LLANOS 31y Female  MRN#: [id number]   CODE STATUS: FULL    Hospital Day: 1d    Pt is currently admitted with the primary diagnosis of acute respiratory failure with hypoxia secondary to COVID-19 pna.    SUBJECTIVE  Hospital Course  31 year old Female  with recent discontinuation of pregnancy at 20 weeks for fetal abnormalities,  presenting c/o fever, cramping abdominal pain, and vaginal bleeding x 1week. Pt was also tested outpatient for covid on  and was POSITIVE.  Pt has had fever tmax 104, nausea, vomiting, diarrhea, cough, chest pain and sob on deep inspiration. Pt states has cramping intermittent abdominal pain, intermittent vaginal bleeding, and lower back pain at epidural injection site.  Came in today due to worsening sob. sts entire fam is covid + pt is unvaccinated. No recent travel, leg pain/swelling, hx of dvt/pe, hormone therapy.    : Started on 10-day course of decadron.  CXR showed patchy bilateral opacities worse in LL.  Inflammatory markers: ddimer 254, cRP 58, ferritin 132, procal 0.09, and lactate 1.0.      Overnight events   No acute events overnight.  on 2L NC sating .    Subjective complaints   Patient seen and examined at bedside.  States she is feeling better but still has SOB with ambulation without the O2 NC.      Present Today:   - Rowell:  No [  ], Yes [   ] : Indication:     - Type of IV Access:       .. CVC/Piccline:  No [  ], Yes [   ] : Indication:       .. Midline: No [  ], Yes [   ] : Indication:                                             ----------------------------------------------------------  OBJECTIVE  PAST MEDICAL & SURGICAL HISTORY  History of anxiety    Other   due  to fetal abnormalities x 2    No significant past surgical history                                              -----------------------------------------------------------  ALLERGIES:  No Known Allergies                                            ------------------------------------------------------------    HOME MEDICATIONS  Home Medications:  Lexapro 20 mg oral tablet: 1 tab(s) orally once a day (01 Aug 2021 10:52)                           MEDICATIONS:  STANDING MEDICATIONS  dexAMETHasone  Injectable 6 milliGRAM(s) IV Push daily  enoxaparin Injectable 40 milliGRAM(s) SubCutaneous daily  escitalopram 20 milliGRAM(s) Oral daily  melatonin 5 milliGRAM(s) Oral at bedtime    PRN MEDICATIONS  acetaminophen   Tablet .. 650 milliGRAM(s) Oral every 4 hours PRN  ALBUTerol    90 MICROgram(s) HFA Inhaler 2 Puff(s) Inhalation every 4 hours PRN  guaifenesin/dextromethorphan Oral Liquid 10 milliLiter(s) Oral every 4 hours PRN  ibuprofen  Tablet. 600 milliGRAM(s) Oral every 6 hours PRN  ondansetron Injectable 4 milliGRAM(s) IV Push every 6 hours PRN                                            ------------------------------------------------------------  VITAL SIGNS: Last 24 Hours  T(C): 37 (02 Aug 2021 05:20), Max: 37.2 (01 Aug 2021 17:16)  T(F): 98.6 (02 Aug 2021 05:20), Max: 98.9 (01 Aug 2021 17:16)  HR: 53 (02 Aug 2021 05:20) (53 - 68)  BP: 95/54 (02 Aug 2021 05:20) (95/54 - 109/53)  BP(mean): --  RR: 18 (02 Aug 2021 08:00) (18 - 20)  SpO2: 93% (02 Aug 2021 08:00) (93% - 97%)                                             --------------------------------------------------------------  LABS:                        12.9   3.57  )-----------( 120      ( 01 Aug 2021 11:03 )             40.5         137  |  102  |  5<L>  ----------------------------<  116<H>  4.3   |  24  |  0.6<L>    Ca    8.0<L>      01 Aug 2021 11:03    TPro  5.8<L>  /  Alb  3.5  /  TBili  0.2  /  DBili  x   /  AST  30  /  ALT  23  /  AlkPhos  124<H>  08    PT/INR - ( 01 Aug 2021 11: )   PT: 13.00 sec;   INR: 1.13 ratio         PTT - ( 01 Aug 2021 11: )  PTT:28.9 sec              CARDIAC MARKERS ( 01 Aug 2021 11:03 )  x     / <0.01 ng/mL / x     / x     / x                                                  -------------------------------------------------------------  RADIOLOGY:                                            --------------------------------------------------------------    PHYSICAL EXAM:  General:   HEENT:  LUNGS:  HEART:  ABDOMEN:  EXT:  NEURO:  SKIN:                                           --------------------------------------------------------------    ASSESSMENT & PLAN    Past medical history and hospital course                                                                                                           ----------------------------------------------------  # DVT prophylaxis     # GI prophylaxis     # Diet     # Activity Score (AM-PAC)    # Code status     # Disposition                                                                              --------------------------------------------------------    Javier Gray      FRANCE LLANOS 31y Female  MRN#: [id number]   CODE STATUS: FULL    Hospital Day: 1d    Pt is currently admitted with the primary diagnosis of acute respiratory failure with hypoxia secondary to COVID-19 pna.    SUBJECTIVE  Hospital Course  31 year old Female  with recent discontinuation of pregnancy at 20 weeks for fetal abnormalities, and anxiety presented with fever, cramping abdominal pain, and vaginal bleeding x 1week. Pt was also tested outpatient for covid on  and was POSITIVE.  Pt has had fever tmax 104, nausea, vomiting, diarrhea, cough, chest pain and sob on deep inspiration. Pt states has cramping intermittent abdominal pain, intermittent vaginal bleeding, and lower back pain at epidural injection site.  Came in today due to worsening sob. sts entire fam is covid + pt is unvaccinated. No recent travel, leg pain/swelling, hx of dvt/pe, hormone therapy.    : Started on 10-day course of decadron.  CXR showed patchy bilateral opacities worse in LL.  Inflammatory markers: ddimer 254, cRP 58, ferritin 132, procal 0.09, and lactate 1.0.      Overnight events   No acute events overnight.  on 2L NC sating .    Subjective complaints   Patient seen and examined at bedside.  States she is feeling better but still has SOB with ambulation without the O2 NC.  Also endorses abdominal cramps, decreased smell, and improved vaginal bleeding since the recent discontinuation of her pregnancy.    Present Today:   - Rowell:  No [ X ], Yes [   ] : Indication:     - Type of IV Access:       .. CVC/Piccline:  No [ X ], Yes [   ] : Indication:       .. Midline: No [ X ], Yes [   ] : Indication:                                             ----------------------------------------------------------  OBJECTIVE  PAST MEDICAL & SURGICAL HISTORY  History of anxiety    Other   due  to fetal abnormalities x 2    No significant past surgical history                                              -----------------------------------------------------------  ALLERGIES:  No Known Allergies                                            ------------------------------------------------------------    HOME MEDICATIONS  Home Medications:  Lexapro 20 mg oral tablet: 1 tab(s) orally once a day (01 Aug 2021 10:52)                           MEDICATIONS:  STANDING MEDICATIONS  dexAMETHasone  Injectable 6 milliGRAM(s) IV Push daily  enoxaparin Injectable 40 milliGRAM(s) SubCutaneous daily  escitalopram 20 milliGRAM(s) Oral daily  melatonin 5 milliGRAM(s) Oral at bedtime    PRN MEDICATIONS  acetaminophen   Tablet .. 650 milliGRAM(s) Oral every 4 hours PRN  ALBUTerol    90 MICROgram(s) HFA Inhaler 2 Puff(s) Inhalation every 4 hours PRN  guaifenesin/dextromethorphan Oral Liquid 10 milliLiter(s) Oral every 4 hours PRN  ibuprofen  Tablet. 600 milliGRAM(s) Oral every 6 hours PRN  ondansetron Injectable 4 milliGRAM(s) IV Push every 6 hours PRN                                            ------------------------------------------------------------  VITAL SIGNS: Last 24 Hours  T(C): 37 (02 Aug 2021 05:20), Max: 37.2 (01 Aug 2021 17:16)  T(F): 98.6 (02 Aug 2021 05:20), Max: 98.9 (01 Aug 2021 17:16)  HR: 53 (02 Aug 2021 05:20) (53 - 68)  BP: 95/54 (02 Aug 2021 05:20) (95/54 - 109/53)  BP(mean): --  RR: 18 (02 Aug 2021 08:00) (18 - 20)  SpO2: 93% (02 Aug 2021 08:00) (93% - 97%)                                             --------------------------------------------------------------  LABS:                        12.9   3.57  )-----------( 120      ( 01 Aug 2021 11:03 )             40.5         137  |  102  |  5<L>  ----------------------------<  116<H>  4.3   |  24  |  0.6<L>    Ca    8.0<L>      01 Aug 2021 11:03    TPro  5.8<L>  /  Alb  3.5  /  TBili  0.2  /  DBili  x   /  AST  30  /  ALT  23  /  AlkPhos  124<H>      PT/INR - ( 01 Aug 2021 11:03 )   PT: 13.00 sec;   INR: 1.13 ratio         PTT - ( 01 Aug 2021 11: )  PTT:28.9 sec              CARDIAC MARKERS ( 01 Aug 2021 11:03 )  x     / <0.01 ng/mL / x     / x     / x                                                  -------------------------------------------------------------  RADIOLOGY:  EXAM:  XR CHEST PORTABLE IMMED 1V, 2021    Impression:  Patchy, peripheral bilateral pulmonary opacities with lower lobe predominance.                                            --------------------------------------------------------------    PHYSICAL EXAM:  General: lying in bed, NAD, breathing comfortably on 2L NC  HEENT: atraumatic, supple  LUNGS: CTA, no wheezing  HEART: RRR, S1/S2  ABDOMEN: soft, nondistended, +BS  EXT: moves all extremities  NEURO: AAOx3, CN grossly intact, no focal deficits  SKIN: warm, dry                                           --------------------------------------------------------------    ASSESSMENT & PLAN  31 year old Female  with recent discontinuation of pregnancy at 20 weeks for fetal abnormalities, and anxiety presented with fever, cramping abdominal pain, and vaginal bleeding x 1week and admitted for acute respiratory failure with hypoxia secondary to COVID-19 pneumonia.      #Acute respiratory failure with hypoxia  #Covid-19 pneumonia  - c/w supplemental O2 via 2L NC  - ID following, not candidate for Remdesivir due to timeframe from infection onset  - Inflammatory markers: ddimer 254, CRP 58, ferritin 132, procal 0.09, lactate 1.0  - c/w 10-day course of decadron (on day 1 of 10)  - hold abx for now  - c/w albuterol inhaler PRN  - c/w tylenol, ibuprofen, and zofran PRN for HA and nausea    #Vaginal bleeding, secondary to pregnancy termination 3 weeks prior to admission - improving  - expected per GYN  - continue to monitor symptoms and CBC  - denies pus or foul smelling discharge    #h/o anxiety  - c/w lexapro                                                                                    ----------------------------------------------------  # DVT prophylaxis: Lovenox  # GI prophylaxis: not indicated  # Diet: regular  # Activity Score (AM-PAC): IAT  # Code status: FULL  # Disposition: from home, floors for now                                                                             --------------------------------------------------------

## 2021-08-03 LAB
ALBUMIN SERPL ELPH-MCNC: 3.5 G/DL — SIGNIFICANT CHANGE UP (ref 3.5–5.2)
ALP SERPL-CCNC: 104 U/L — SIGNIFICANT CHANGE UP (ref 30–115)
ALT FLD-CCNC: 32 U/L — SIGNIFICANT CHANGE UP (ref 0–41)
ANION GAP SERPL CALC-SCNC: 8 MMOL/L — SIGNIFICANT CHANGE UP (ref 7–14)
AST SERPL-CCNC: 35 U/L — SIGNIFICANT CHANGE UP (ref 0–41)
BASOPHILS # BLD AUTO: 0 K/UL — SIGNIFICANT CHANGE UP (ref 0–0.2)
BASOPHILS NFR BLD AUTO: 0 % — SIGNIFICANT CHANGE UP (ref 0–1)
BILIRUB SERPL-MCNC: <0.2 MG/DL — SIGNIFICANT CHANGE UP (ref 0.2–1.2)
BUN SERPL-MCNC: 6 MG/DL — LOW (ref 10–20)
CALCIUM SERPL-MCNC: 8.5 MG/DL — SIGNIFICANT CHANGE UP (ref 8.5–10.1)
CHLORIDE SERPL-SCNC: 103 MMOL/L — SIGNIFICANT CHANGE UP (ref 98–110)
CO2 SERPL-SCNC: 29 MMOL/L — SIGNIFICANT CHANGE UP (ref 17–32)
CREAT SERPL-MCNC: 0.5 MG/DL — LOW (ref 0.7–1.5)
EOSINOPHIL # BLD AUTO: 0 K/UL — SIGNIFICANT CHANGE UP (ref 0–0.7)
EOSINOPHIL NFR BLD AUTO: 0 % — SIGNIFICANT CHANGE UP (ref 0–8)
GLUCOSE SERPL-MCNC: 101 MG/DL — HIGH (ref 70–99)
HCT VFR BLD CALC: 37.4 % — SIGNIFICANT CHANGE UP (ref 37–47)
HGB BLD-MCNC: 12.1 G/DL — SIGNIFICANT CHANGE UP (ref 12–16)
IMM GRANULOCYTES NFR BLD AUTO: 0.5 % — HIGH (ref 0.1–0.3)
LYMPHOCYTES # BLD AUTO: 0.79 K/UL — LOW (ref 1.2–3.4)
LYMPHOCYTES # BLD AUTO: 13.9 % — LOW (ref 20.5–51.1)
MAGNESIUM SERPL-MCNC: 1.9 MG/DL — SIGNIFICANT CHANGE UP (ref 1.8–2.4)
MCHC RBC-ENTMCNC: 30.2 PG — SIGNIFICANT CHANGE UP (ref 27–31)
MCHC RBC-ENTMCNC: 32.4 G/DL — SIGNIFICANT CHANGE UP (ref 32–37)
MCV RBC AUTO: 93.3 FL — SIGNIFICANT CHANGE UP (ref 81–99)
MONOCYTES # BLD AUTO: 0.23 K/UL — SIGNIFICANT CHANGE UP (ref 0.1–0.6)
MONOCYTES NFR BLD AUTO: 4.1 % — SIGNIFICANT CHANGE UP (ref 1.7–9.3)
NEUTROPHILS # BLD AUTO: 4.62 K/UL — SIGNIFICANT CHANGE UP (ref 1.4–6.5)
NEUTROPHILS NFR BLD AUTO: 81.5 % — HIGH (ref 42.2–75.2)
NRBC # BLD: 0 /100 WBCS — SIGNIFICANT CHANGE UP (ref 0–0)
PLATELET # BLD AUTO: 155 K/UL — SIGNIFICANT CHANGE UP (ref 130–400)
POTASSIUM SERPL-MCNC: 5.4 MMOL/L — HIGH (ref 3.5–5)
POTASSIUM SERPL-SCNC: 5.4 MMOL/L — HIGH (ref 3.5–5)
PROT SERPL-MCNC: 5.9 G/DL — LOW (ref 6–8)
RBC # BLD: 4.01 M/UL — LOW (ref 4.2–5.4)
RBC # FLD: 11.5 % — SIGNIFICANT CHANGE UP (ref 11.5–14.5)
SODIUM SERPL-SCNC: 140 MMOL/L — SIGNIFICANT CHANGE UP (ref 135–146)
WBC # BLD: 5.67 K/UL — SIGNIFICANT CHANGE UP (ref 4.8–10.8)
WBC # FLD AUTO: 5.67 K/UL — SIGNIFICANT CHANGE UP (ref 4.8–10.8)

## 2021-08-03 PROCEDURE — 93970 EXTREMITY STUDY: CPT | Mod: 26

## 2021-08-03 PROCEDURE — 99233 SBSQ HOSP IP/OBS HIGH 50: CPT

## 2021-08-03 RX ORDER — CHLORHEXIDINE GLUCONATE 213 G/1000ML
1 SOLUTION TOPICAL
Refills: 0 | Status: DISCONTINUED | OUTPATIENT
Start: 2021-08-03 | End: 2021-08-04

## 2021-08-03 RX ADMIN — Medication 10 MILLILITER(S): at 13:42

## 2021-08-03 RX ADMIN — Medication 600 MILLIGRAM(S): at 22:21

## 2021-08-03 RX ADMIN — Medication 500 MILLIGRAM(S): at 11:41

## 2021-08-03 RX ADMIN — Medication 6 MILLIGRAM(S): at 05:51

## 2021-08-03 RX ADMIN — ENOXAPARIN SODIUM 40 MILLIGRAM(S): 100 INJECTION SUBCUTANEOUS at 11:41

## 2021-08-03 RX ADMIN — Medication 600 MILLIGRAM(S): at 16:20

## 2021-08-03 RX ADMIN — ONDANSETRON 4 MILLIGRAM(S): 8 TABLET, FILM COATED ORAL at 13:41

## 2021-08-03 RX ADMIN — Medication 600 MILLIGRAM(S): at 22:51

## 2021-08-03 RX ADMIN — Medication 1000 UNIT(S): at 11:41

## 2021-08-03 RX ADMIN — Medication 5 MILLIGRAM(S): at 21:59

## 2021-08-03 RX ADMIN — Medication 600 MILLIGRAM(S): at 16:50

## 2021-08-03 RX ADMIN — ESCITALOPRAM OXALATE 20 MILLIGRAM(S): 10 TABLET, FILM COATED ORAL at 11:41

## 2021-08-03 NOTE — PROGRESS NOTE ADULT - SUBJECTIVE AND OBJECTIVE BOX
FRANCE LLANOS 31y Female  MRN#: [id number]   CODE STATUS: FULL    Hospital Day: 2d    Pt is currently admitted with the primary diagnosis of acute respiratory failure with hypoxia secondary to COVID-19 pna.    SUBJECTIVE  Hospital Course  31 year old Female  with recent discontinuation of pregnancy at 20 weeks for fetal abnormalities, and anxiety presented with fever, cramping abdominal pain, and vaginal bleeding x 1week. Pt was also tested outpatient for covid on  and was POSITIVE.  Pt has had fever tmax 104, nausea, vomiting, diarrhea, cough, chest pain and sob on deep inspiration. Pt states has cramping intermittent abdominal pain, intermittent vaginal bleeding, and lower back pain at epidural injection site.  Came in today due to worsening sob. sts entire fam is covid + pt is unvaccinated. No recent travel, leg pain/swelling, hx of dvt/pe, hormone therapy.    : Started on 10-day course of decadron.  CXR showed patchy bilateral opacities worse in LL.  Inflammatory markers: ddimer 254, cRP 58, ferritin 132, procal 0.09, and lactate 1.0.      Overnight events   Patient had episode of substernal chest pain yesterday afternoon.  EKG and troponins were negative.  Likely secondary to persistent cough.  Will continue to monitor.    Subjective complaints   Patient seen and examined at bedside.  States her symptoms are improved this AM and no longer endorses chest pain.  Sating well on 3L NC.    Present Today:   - Sorin:  No [ X ], Yes [   ] : Indication:     - Type of IV Access:       .. CVC/Piccline:  No [ X ], Yes [   ] : Indication:       .. Midline: No [ X ], Yes [   ] : Indication:                                             ----------------------------------------------------------  OBJECTIVE  PAST MEDICAL & SURGICAL HISTORY  History of anxiety    Other   due  to fetal abnormalities x 2    No significant past surgical history                                              -----------------------------------------------------------  ALLERGIES:  No Known Allergies                                            ------------------------------------------------------------    HOME MEDICATIONS  Home Medications:  Lexapro 20 mg oral tablet: 1 tab(s) orally once a day (01 Aug 2021 10:52)                           MEDICATIONS:  STANDING MEDICATIONS  ascorbic acid 500 milliGRAM(s) Oral daily  chlorhexidine 4% Liquid 1 Application(s) Topical two times a day  cholecalciferol 1000 Unit(s) Oral daily  dexAMETHasone  Injectable 6 milliGRAM(s) IV Push daily  enoxaparin Injectable 40 milliGRAM(s) SubCutaneous daily  escitalopram 20 milliGRAM(s) Oral daily  melatonin 5 milliGRAM(s) Oral at bedtime    PRN MEDICATIONS  acetaminophen   Tablet .. 650 milliGRAM(s) Oral every 4 hours PRN  ALBUTerol    90 MICROgram(s) HFA Inhaler 2 Puff(s) Inhalation every 4 hours PRN  guaifenesin/dextromethorphan Oral Liquid 10 milliLiter(s) Oral every 4 hours PRN  ibuprofen  Tablet. 600 milliGRAM(s) Oral every 6 hours PRN  ondansetron Injectable 4 milliGRAM(s) IV Push every 6 hours PRN                                            ------------------------------------------------------------  VITAL SIGNS: Last 24 Hours  T(C): 35.8 (03 Aug 2021 14:13), Max: 36.4 (02 Aug 2021 21:00)  T(F): 96.5 (03 Aug 2021 14:13), Max: 97.6 (02 Aug 2021 21:00)  HR: 52 (03 Aug 2021 14:13) (50 - 58)  BP: 99/60 (03 Aug 2021 14:13) (99/49 - 104/53)  BP(mean): --  RR: 18 (03 Aug 2021 14:13) (18 - 20)  SpO2: 98% (03 Aug 2021 14:13) (94% - 98%)                                             --------------------------------------------------------------  LABS:                        12.1   5.67  )-----------( 155      ( 03 Aug 2021 08:23 )             37.4     08-03    140  |  103  |  6<L>  ----------------------------<  101<H>  5.4<H>   |  29  |  0.5<L>    Ca    8.5      03 Aug 2021 08:23  Mg     1.9     -03    TPro  5.9<L>  /  Alb  3.5  /  TBili  <0.2  /  DBili  x   /  AST  35  /  ALT  32  /  AlkPhos  104            Troponin T, Serum: <0.01 ng/mL (21 @ 18:02)          CARDIAC MARKERS ( 02 Aug 2021 18:02 )  x     / <0.01 ng/mL / x     / x     / x                                                  -------------------------------------------------------------  RADIOLOGY:  EXAM:  XR CHEST PORTABLE IMMED 1V, 2021    Impression:  Patchy, peripheral bilateral pulmonary opacities with lower lobe predominance.                                            --------------------------------------------------------------    PHYSICAL EXAM:  General: lying in bed, NAD, breathing comfortably on 3L NC  HEENT: atraumatic, supple  LUNGS: CTA, no wheezing  HEART: RRR, S1/S2  ABDOMEN: soft, nondistended, +BS  EXT: moves all extremities  NEURO: AAOx3, CN grossly intact, no focal deficits  SKIN: warm, dry                                           --------------------------------------------------------------    ASSESSMENT & PLAN  31 year old Female  with recent discontinuation of pregnancy at 20 weeks for fetal abnormalities, and anxiety presented with fever, cramping abdominal pain, and vaginal bleeding x 1week and admitted for acute respiratory failure with hypoxia secondary to COVID-19 pneumonia.      #Acute respiratory failure with hypoxia  #Covid-19 pneumonia  - c/w supplemental O2 via 2L NC  - ID following, not candidate for Remdesivir due to timeframe from infection onset  - Inflammatory markers: ddimer 254, CRP 58, ferritin 132, procal 0.09, lactate 1.0  - c/w 10-day course of decadron (on day 2 of 10)  - hold abx for now  - c/w albuterol inhaler PRN  - c/w tylenol, ibuprofen, and zofran PRN for HA and nausea    #Vaginal bleeding, secondary to pregnancy termination 3 weeks prior to admission - improving  - expected per GYN  - continue to monitor symptoms and CBC  - denies pus or foul smelling discharge    #h/o anxiety  - c/w lexapro                                                                                  ----------------------------------------------------  # DVT prophylaxis: Lovenox  # GI prophylaxis: not indicated  # Diet: regular  # Activity Score (AM-PAC): IAT  # Code status: FULL  # Disposition: from home, floors for now

## 2021-08-03 NOTE — PROGRESS NOTE ADULT - SUBJECTIVE AND OBJECTIVE BOX
MRN:[id number]                      After Visit Summary   4/8/2018    Nanda Srivastava    MRN: [id number]           Thank you!     Thank you for choosing [facility] for your care. Our goal is always to provide you with excellent care. Hearing back from our patients is one way we can continue to improve our services. Please take a few minutes to complete the written survey that you may receive in the mail after you visit with us. Thank you!        Patient Information     Date Of Birth          1991        About your hospital stay     You were admitted on:  April 8, 2018 You last received care in the:  [facility]    You were discharged on:  April 8, 2018       Who to Call     For medical emergencies, please call 911.  For non-urgent questions about your medical care, please call your primary care provider or clinic, None          Attending Provider     Provider Specialty    Jamie Montalvo MD OB/Gyn       Primary Care Provider    None Specified      Your next 10 appointments already scheduled     Apr 27, 2018   Procedure with Jamie Montalvo MD    Birthplace (--)    [address] [place] [address], [address]  [facility] [address] [phone]              Further instructions from your care team       Discharge Instruction for Undelivered Patients      You were seen for: Labor Assessment  We Consulted: Dr. SUE Montalvo  You had (Test or Medicine):urinalysis     Diet:   Drink 8 to 12 glasses of liquids (milk, juice, water) every day.     Activity:  Call your doctor or nurse midwife if your baby is moving less than usual.     Call your provider if you notice:  Swelling in your face or increased swelling in your hands or legs.  Headaches that are not relieved by Tylenol (acetaminophen).  Changes in your vision (blurring: seeing spots or stars.)  Nausea (sick to your stomach) and vomiting (throwing up).   Weight gain of 5 pounds or more per week.  Heartburn that doesn't go away.  Signs of  "bladder infection: pain when you urinate (use the toilet), need to go more often and more urgently.  The bag of carter (rupture of membranes) breaks, or you notice leaking in your underwear.  Bright red blood in your underwear.  Abdominal (lower belly) or stomach pain.  For first baby: Contractions (tightening) less than 5 minutes apart for one hour or more.  Second (plus) baby: Contractions (tightening) less than 10 minutes apart and getting stronger.  *If less than 34 weeks: Contractions (tightenings) more than 6 times in one hour.  Increase or change in vaginal discharge (note the color and amount)      Follow-up:  As scheduled in the clinic          Pending Results     Date and Time Order Name Status Description    2018 1431 UA with Microscopic reflex to Culture In process             Admission Information     Date & Time Provider Department Dept. Phone    2018 Jamie Montalvo MD [facility] [phone]      Your Vitals Were     Blood Pressure Temperature Respirations             125/81 100  F (37.8  C) (Temporal) 16         MyChart Information     [employer] lets you send messages to your doctor, view your test results, renew your prescriptions, schedule appointments and more. To sign up, go to [email or web address] . Click on \"Log in\" on the left side of the screen, which will take you to the Welcome page. Then click on \"Sign up Now\" on the right side of the page.     You will be asked to enter the access code listed below, as well as some personal information. Please follow the directions to create your username and password.     Your access code is: [email or web address]  Expires: 2018  3:36 PM     Your access code will  in 90 days. If you need help or a new code, please call your [facility] clinic or [phone].        Care EveryWhere ID     This is your Care EveryWhere ID. This could be used by other organizations to access your [facility] medical records  [id number]        Equal " Access to Services     [facility]: Hadii aad ku hadchichotash Nasreenerin, waaxda luqadaha, qaybta kaalmacristian garcia. So [facility] [phone].    ATENCIÓN: Si habla español, tiene a granados disposición servicios gratuitos de asistencia lingüística. Llame al [phone].    We comply with applicable federal civil rights laws and [place] laws. We do not discriminate on the basis of race, color, national origin, age, disability, sex, sexual orientation, or gender identity.               Review of your medicines      UNREVIEWED medicines. Ask your doctor about these medicines        Dose / Directions    ibuprofen 600 MG tablet   Commonly known as:  ADVIL/MOTRIN   Used for:  Missed         Dose:  600 mg   Take 1 tablet (600 mg) by mouth every 6 hours as needed for pain (mild)   Quantity:  30 tablet   Refills:  0       PNV PO        Dose:  1 tablet   Take 1 tablet by mouth   Refills:  0       TUMS 500 MG chewable tablet   Indication:  Heartburn   Generic drug:  calcium carbonate        Dose:  2 chew tab   Take 2 chew tab by mouth as needed for heartburn   Refills:  0                Protect others around you: Learn how to safely use, store and throw away your medicines at www.disposemymeds.org.             Medication List: This is a list of all your medications and when to take them. Check marks below indicate your daily home schedule. Keep this list as a reference.      Medications           Morning Afternoon Evening Bedtime As Needed    ibuprofen 600 MG tablet   Commonly known as:  ADVIL/MOTRIN   Take 1 tablet (600 mg) by mouth every 6 hours as needed for pain (mild)                                PNV PO   Take 1 tablet by mouth                                TUMS 500 MG chewable tablet   Take 2 chew tab by mouth as needed for heartburn   Generic drug:  calcium carbonate                                   S: breathing better  no more CP  walking, eating, drinking ok  Sad about death of her relative.       All other pertinent ROS negative.      Vital Signs Last 24 Hrs  T(C): 35.8 (03 Aug 2021 14:13), Max: 36.4 (02 Aug 2021 21:00)  T(F): 96.5 (03 Aug 2021 14:13), Max: 97.6 (02 Aug 2021 21:00)  HR: 52 (03 Aug 2021 14:13) (50 - 58)  BP: 99/60 (03 Aug 2021 14:13) (99/49 - 104/53)  BP(mean): --  RR: 18 (03 Aug 2021 14:13) (18 - 20)  SpO2: 98% (03 Aug 2021 14:13) (94% - 98%)  PHYSICAL EXAM:    Constitutional: NAD, awake and alert, well-developed  HEENT: PERR, EOMI, Normal Hearing, MMM  Neck: Soft and supple, No LAD, No JVD  Respiratory: Breath sounds are clear bilaterally, No wheezing, rales. occ rhonchi  Cardiovascular: S1 and S2, regular rate and rhythm, no Murmurs, gallops or rubs  Gastrointestinal: Bowel Sounds present, soft, nontender, nondistended, no guarding, no rebound  Extremities: No peripheral edema      MEDICATIONS:  MEDICATIONS  (STANDING):  ascorbic acid 500 milliGRAM(s) Oral daily  chlorhexidine 4% Liquid 1 Application(s) Topical two times a day  cholecalciferol 1000 Unit(s) Oral daily  dexAMETHasone  Injectable 6 milliGRAM(s) IV Push daily  enoxaparin Injectable 40 milliGRAM(s) SubCutaneous daily  escitalopram 20 milliGRAM(s) Oral daily  melatonin 5 milliGRAM(s) Oral at bedtime      LABS: All Labs Reviewed:                        12.1   5.67  )-----------( 155      ( 03 Aug 2021 08:23 )             37.4     08-03    140  |  103  |  6<L>  ----------------------------<  101<H>  5.4<H>   |  29  |  0.5<L>    Ca    8.5      03 Aug 2021 08:23  Mg     1.9     08-03    TPro  5.9<L>  /  Alb  3.5  /  TBili  <0.2  /  DBili  x   /  AST  35  /  ALT  32  /  AlkPhos  104  08-03      CARDIAC MARKERS ( 02 Aug 2021 18:02 )  x     / <0.01 ng/mL / x     / x     / x          Blood Culture:     Radiology: reviewed

## 2021-08-04 ENCOUNTER — TRANSCRIPTION ENCOUNTER (OUTPATIENT)
Age: 31
End: 2021-08-04

## 2021-08-04 VITALS
DIASTOLIC BLOOD PRESSURE: 59 MMHG | OXYGEN SATURATION: 96 % | RESPIRATION RATE: 18 BRPM | SYSTOLIC BLOOD PRESSURE: 105 MMHG | HEART RATE: 53 BPM | TEMPERATURE: 96 F

## 2021-08-04 LAB
ALBUMIN SERPL ELPH-MCNC: 3.3 G/DL — LOW (ref 3.5–5.2)
ALP SERPL-CCNC: 102 U/L — SIGNIFICANT CHANGE UP (ref 30–115)
ALT FLD-CCNC: 37 U/L — SIGNIFICANT CHANGE UP (ref 0–41)
ANION GAP SERPL CALC-SCNC: 8 MMOL/L — SIGNIFICANT CHANGE UP (ref 7–14)
AST SERPL-CCNC: 41 U/L — SIGNIFICANT CHANGE UP (ref 0–41)
BASOPHILS # BLD AUTO: 0.01 K/UL — SIGNIFICANT CHANGE UP (ref 0–0.2)
BASOPHILS NFR BLD AUTO: 0.2 % — SIGNIFICANT CHANGE UP (ref 0–1)
BILIRUB SERPL-MCNC: <0.2 MG/DL — SIGNIFICANT CHANGE UP (ref 0.2–1.2)
BUN SERPL-MCNC: 7 MG/DL — LOW (ref 10–20)
CALCIUM SERPL-MCNC: 8.6 MG/DL — SIGNIFICANT CHANGE UP (ref 8.5–10.1)
CHLORIDE SERPL-SCNC: 102 MMOL/L — SIGNIFICANT CHANGE UP (ref 98–110)
CO2 SERPL-SCNC: 29 MMOL/L — SIGNIFICANT CHANGE UP (ref 17–32)
CREAT SERPL-MCNC: 0.6 MG/DL — LOW (ref 0.7–1.5)
EOSINOPHIL # BLD AUTO: 0.01 K/UL — SIGNIFICANT CHANGE UP (ref 0–0.7)
EOSINOPHIL NFR BLD AUTO: 0.2 % — SIGNIFICANT CHANGE UP (ref 0–8)
GLUCOSE SERPL-MCNC: 80 MG/DL — SIGNIFICANT CHANGE UP (ref 70–99)
HCT VFR BLD CALC: 38.5 % — SIGNIFICANT CHANGE UP (ref 37–47)
HGB BLD-MCNC: 12.3 G/DL — SIGNIFICANT CHANGE UP (ref 12–16)
IMM GRANULOCYTES NFR BLD AUTO: 0.6 % — HIGH (ref 0.1–0.3)
LYMPHOCYTES # BLD AUTO: 1.44 K/UL — SIGNIFICANT CHANGE UP (ref 1.2–3.4)
LYMPHOCYTES # BLD AUTO: 31.1 % — SIGNIFICANT CHANGE UP (ref 20.5–51.1)
MAGNESIUM SERPL-MCNC: 1.8 MG/DL — SIGNIFICANT CHANGE UP (ref 1.8–2.4)
MCHC RBC-ENTMCNC: 29.9 PG — SIGNIFICANT CHANGE UP (ref 27–31)
MCHC RBC-ENTMCNC: 31.9 G/DL — LOW (ref 32–37)
MCV RBC AUTO: 93.7 FL — SIGNIFICANT CHANGE UP (ref 81–99)
MONOCYTES # BLD AUTO: 0.42 K/UL — SIGNIFICANT CHANGE UP (ref 0.1–0.6)
MONOCYTES NFR BLD AUTO: 9.1 % — SIGNIFICANT CHANGE UP (ref 1.7–9.3)
NEUTROPHILS # BLD AUTO: 2.72 K/UL — SIGNIFICANT CHANGE UP (ref 1.4–6.5)
NEUTROPHILS NFR BLD AUTO: 58.8 % — SIGNIFICANT CHANGE UP (ref 42.2–75.2)
NRBC # BLD: 0 /100 WBCS — SIGNIFICANT CHANGE UP (ref 0–0)
PLATELET # BLD AUTO: 177 K/UL — SIGNIFICANT CHANGE UP (ref 130–400)
POTASSIUM SERPL-MCNC: 4.9 MMOL/L — SIGNIFICANT CHANGE UP (ref 3.5–5)
POTASSIUM SERPL-SCNC: 4.9 MMOL/L — SIGNIFICANT CHANGE UP (ref 3.5–5)
PROT SERPL-MCNC: 5.7 G/DL — LOW (ref 6–8)
RBC # BLD: 4.11 M/UL — LOW (ref 4.2–5.4)
RBC # FLD: 11.4 % — LOW (ref 11.5–14.5)
SODIUM SERPL-SCNC: 139 MMOL/L — SIGNIFICANT CHANGE UP (ref 135–146)
WBC # BLD: 4.63 K/UL — LOW (ref 4.8–10.8)
WBC # FLD AUTO: 4.63 K/UL — LOW (ref 4.8–10.8)

## 2021-08-04 PROCEDURE — 99239 HOSP IP/OBS DSCHRG MGMT >30: CPT

## 2021-08-04 RX ORDER — MULTIVIT-MIN/FERROUS GLUCONATE 9 MG/15 ML
1 LIQUID (ML) ORAL
Qty: 30 | Refills: 0
Start: 2021-08-04

## 2021-08-04 RX ADMIN — Medication 500 MILLIGRAM(S): at 11:09

## 2021-08-04 RX ADMIN — ESCITALOPRAM OXALATE 20 MILLIGRAM(S): 10 TABLET, FILM COATED ORAL at 11:09

## 2021-08-04 RX ADMIN — ENOXAPARIN SODIUM 40 MILLIGRAM(S): 100 INJECTION SUBCUTANEOUS at 11:12

## 2021-08-04 RX ADMIN — Medication 1000 UNIT(S): at 11:09

## 2021-08-04 RX ADMIN — ONDANSETRON 4 MILLIGRAM(S): 8 TABLET, FILM COATED ORAL at 14:12

## 2021-08-04 RX ADMIN — Medication 600 MILLIGRAM(S): at 14:11

## 2021-08-04 RX ADMIN — Medication 6 MILLIGRAM(S): at 05:45

## 2021-08-04 NOTE — DISCHARGE NOTE PROVIDER - NSFOLLOWUPCLINICS_GEN_ALL_ED_FT
[facility]  Medicine  [address], [place]   Phone: [phone]  Fax:   Follow Up Time: 2 weeks

## 2021-08-04 NOTE — DISCHARGE NOTE PROVIDER - NSDCPNSUBOBJ_GEN_ALL_CORE
<<<RESIDENT DISCHARGE NOTE>>>     FRANCE LLANOS  MRN-[id number]    VITAL SIGNS:  T(F): 97 (08-04-21 @ 05:00), Max: 97 (08-04-21 @ 05:00)  HR: 48 (08-04-21 @ 05:00)  BP: 101/65 (08-04-21 @ 05:00)  SpO2: 97% (08-04-21 @ 07:56)      PHYSICAL EXAMINATION:  General: lying in bed, NAD, breathing and speaking comfortably on room air  Head & Neck: atraumatic, supple, no jvd  Pulmonary: CTA, no wheezing/rales/rhonchi, intermittent cough  Cardiovascular: RRR, S1/s2 detected  Gastrointestinal/Abdomen & Pelvis: soft, nontender, nondistended, + BS  Neurologic/Motor: AAOx3, no focal deficits, CN grossly intact    TEST RESULTS:                        12.3   4.63  )-----------( 177      ( 04 Aug 2021 07:15 )             38.5       08-04    139  |  102  |  7<L>  ----------------------------<  80  4.9   |  29  |  0.6<L>    Ca    8.6      04 Aug 2021 07:15  Mg     1.8     08-04    TPro  5.7<L>  /  Alb  3.3<L>  /  TBili  <0.2  /  DBili  x   /  AST  41  /  ALT  37  /  AlkPhos  102  08-04      FINAL DISCHARGE INTERVIEW:  Resident(s) Present: (Name: Dr. Zakiya Finnegan, RN Present: (Name: YUMIKO Doe)    DISCHARGE MEDICATION RECONCILIATION  reviewed with Attending (Name: Dr. Neda Villagomez)    DISPOSITION:   [ X ] Home,    [  ] Home with Visiting Nursing Services,   [    ]  SNF/ NH,    [   ] Acute Rehab (4A),   [   ] Other (Specify:_________)

## 2021-08-04 NOTE — DISCHARGE NOTE PROVIDER - NSDCFUADDINST_GEN_ALL_CORE_FT
Please quarantine at home for 10 days following discharge from the hospital.  After the 10-day quarantine, please make an appointment to follow-up with the outpatient clinic. Please quarantine at home for 10 days following discharge from the hospital.  After the 10-day quarantine, please make an appointment to follow-up with the outpatient clinic.    Will need follow-up chest x-ray 30-days after discharge. Please quarantine at home for 7 days following discharge from the hospital.  After the 7-day quarantine, please follow-up with Dr. Donny Winters at the outpatient clinic.    You will need a follow-up chest x-ray 30-days after discharge.

## 2021-08-04 NOTE — CHART NOTE - NSCHARTNOTEFT_GEN_A_CORE
[place] O2 Covid    Patient requires home oxygen due to covid pna, ivab have been tried along with steroids and have been unsuccessful.  Patient is otherwise in chronic stable state.    Patient is aware and agreeable to home oxygen.  She will require concentrator and portable oxygen to patient home as she will travel home via ambulance.    Oxygen saturation on room air at REST is 97%.  Oxygen saturation on room air during AMBULATION decreases to 84%.  Oxygen saturation while AMBULATING with nasal cannula @2 liters/minute improves to 97%.

## 2021-08-04 NOTE — PROGRESS NOTE ADULT - SUBJECTIVE AND OBJECTIVE BOX
FRANCE LLANOS  [facility] T[facility] 025 A ([facility] T[facility])            Patient was evaluated and examined  by bedside,                 REVIEW OF SYSTEMS:  please see pertinent positives mentioned above, all other 12 ROS negative      T(C): , Max: 36.1 (21 @ 05:00)  HR: 48 (21 @ 05:00)  BP: 101/65 (21 @ 05:00)  RR: 18 (21 @ 05:00)  SpO2: 84% (21 @ 11:01)  CAPILLARY BLOOD GLUCOSE          PHYSICAL EXAM:  General: NAD, AAOX3, patient is laying comfortably in bed  HEENT: AT, NC, Supple, NO JVD, NO CB  Lungs: CTA B/L, no wheezing, no rhonchi  CVS: normal S1, S2, RRR, NO M/G/R  Abdomen: soft, bowel sounds present, non-tender, non-distended  Extremities: no edema, no clubbing, no cyanosis, positive peripheral pulses b/l  Neuro: no acute focal neurological deficits  Skin: no rush, no ecchymosis      LAB  CBC  Date: 21 @ 07:15  Mean cell Otumrvyaeq48.9  Mean cell Hemoglobin Conc31.9  Mean cell Volum 93.7  Platelet count-Automate 177  RBC Count 4.11  Red Cell Distrib Width11.4  WBC Count4.63  % Albumin, Urine--  Hematocrit 38.5  Hemoglobin 12.3  CBC  Date: 21 @ 08:23  Mean cell Mvvmpghkre12.2  Mean cell Hemoglobin Conc32.4  Mean cell Volum 93.3  Platelet count-Automate 155  RBC Count 4.01  Red Cell Distrib Width11.5  WBC Count5.67  % Albumin, Urine--  Hematocrit 37.4  Hemoglobin 12.1  CBC  Date: 21 @ 11:03  Mean cell Wkiaahcvlt14.5  Mean cell Hemoglobin Conc31.9  Mean cell Volum 95.7  Platelet count-Automate 120  RBC Count 4.23  Red Cell Distrib Width11.7  WBC Count3.57  % Albumin, Urine--  Hematocrit 40.5  Hemoglobin 12.9    BMP  21 @ 07:15  Blood Gas Arterial-Calcium,Ionized--  Blood Urea Nitrogen, Serum 7 mg/dL<L> [10 - 20]  Carbon Dioxide, Serum29 mmol/L [17 - 32]  Chloride, Txicd984 mmol/L [98 - 110]  Creatinie, Serum0.6 mg/dL<L> [0.7 - 1.5]  Glucose, Serum80 mg/dL [70 - 99]  Potassium, Serum4.9 mmol/L [3.5 - 5.0]  Sodium, Serum 139 mmol/L [135 - 146]  [facility]  21 @ 08:23  Blood Gas Arterial-Calcium,Ionized--  Blood Urea Nitrogen, Serum 6 mg/dL<L> [10 - 20]  Carbon Dioxide, Serum29 mmol/L [17 - 32]  Chloride, Bxddf941 mmol/L [98 - 110]  Creatinie, Serum0.5 mg/dL<L> [0.7 - 1.5]  Glucose, Ybifm666 mg/dL<H> [70 - 99]  Potassium, Serum5.4 mmol/L<H> [3.5 - 5.0]  Sodium, Serum 140 mmol/L [135 - 146]  [facility]  21 @ 11:03  Blood Gas Arterial-Calcium,Ionized--  Blood Urea Nitrogen, Serum 5 mg/dL<L> [10 - 20]  Carbon Dioxide, Serum24 mmol/L [17 - 32]  Chloride, Dortc657 mmol/L [98 - 110]  Creatinie, Serum0.6 mg/dL<L> [0.7 - 1.5]  Glucose, Flnpr875 mg/dL<H> [70 - 99]  Potassium, Serum4.3 mmol/L [3.5 - 5.0]  Sodium, Serum 137 mmol/L [135 - 146]      Medications:  acetaminophen   Tablet .. 650 milliGRAM(s) Oral every 4 hours PRN  ALBUTerol    90 MICROgram(s) HFA Inhaler 2 Puff(s) Inhalation every 4 hours PRN  ascorbic acid 500 milliGRAM(s) Oral daily  chlorhexidine 4% Liquid 1 Application(s) Topical two times a day  cholecalciferol 1000 Unit(s) Oral daily  dexAMETHasone  Injectable 6 milliGRAM(s) IV Push daily  enoxaparin Injectable 40 milliGRAM(s) SubCutaneous daily  escitalopram 20 milliGRAM(s) Oral daily  guaifenesin/dextromethorphan Oral Liquid 10 milliLiter(s) Oral every 4 hours PRN  ibuprofen  Tablet. 600 milliGRAM(s) Oral every 6 hours PRN  melatonin 5 milliGRAM(s) Oral at bedtime  ondansetron Injectable 4 milliGRAM(s) IV Push every 6 hours PRN        Assessment and Plan:  Patient is a 31 year old Female  with recent termination of pregnancy at 20 weeks for fetal abnormalities, and anxiety presented with fever, cramping abdominal pain, and vaginal bleeding x 1week and admitted for acute respiratory failure with hypoxia secondary to COVID-19 pneumonia.      #Acute respiratory failure with hypoxia  #Covid-19 pneumonia  -- ID following, not candidate for Remdesivir due to timeframe from infection onset  - Inflammatory markers: ddimer 254, CRP 58, ferritin 132, procal 0.09, lactate 1.0  - c/w 10-day course of decadron, upon d/c home will switch to PO Prednisone 40 mg po once daily  - c/w tylenol, ibuprofen, and zofran PRN for HA and nausea    8/2: 2L NC.  c/w Dexa.   8/3: 2L NC. walking ok. No chest pain. vaginal bleeding improved. c/w Dexa.   : on room air desaturating to low 80's- if confirmed than pt. will need home oxygen tx.         #Vaginal bleeding, secondary to pregnancy termination 3 weeks prior to admission - improving  - expected per GYN  - continue to monitor symptoms and CBC  - denies pus or foul smelling discharge    #h/o anxiety  - c/w lexapro      # DVT prophylaxis: Lovenox    # Code status: FULL    #Progress Note Handoff: d/c home planning today, will recheck ambulatory Oxygenation level on room air  Family discussion: Patient verbalized good understanding of discharge instructions and agreed with discharge plan.  Disposition: Home_possibly today    Total time spent to complete patient's bedside assessment, review medical chart, discuss discharge  plan of care with covering medical team, patient was more than 35 minutes

## 2021-08-04 NOTE — PROGRESS NOTE ADULT - ASSESSMENT
31 year old Female  with recent termination of pregnancy at 20 weeks for fetal abnormalities, and anxiety presented with fever, cramping abdominal pain, and vaginal bleeding x 1week and admitted for acute respiratory failure with hypoxia secondary to COVID-19 pneumonia.      #Acute respiratory failure with hypoxia  #Covid-19 pneumonia  - c/w supplemental O2 via 2L NC  - ID following, not candidate for Remdesivir due to timeframe from infection onset  - Inflammatory markers: ddimer 254, CRP 58, ferritin 132, procal 0.09, lactate 1.0  - c/w 10-day course of decadron (on day 1 of 10)  - hold abx for now  - c/w albuterol inhaler PRN  - c/w tylenol, ibuprofen, and zofran PRN for HA and nausea    8/2: 2L NC.  c/w Dexa.   8/3: 2L NC. walking ok. No chest pain. vaginal bleeding improved. c/w Dexa.   possible d/c home w/ O2 in 24-48 hours. CHECK WALKING SATS TOMORROW.     #Hyperkalemia:  Repeat @ 4PM  If still high,Lokelma & EKG.     #Vaginal bleeding, secondary to pregnancy termination 3 weeks prior to admission - improving  - expected per GYN  - continue to monitor symptoms and CBC  - denies pus or foul smelling discharge    #h/o anxiety  - c/w lexapro      # DVT prophylaxis: Lovenox  # GI prophylaxis: not indicated  # Diet: regular  # Activity Score (AM-PAC): IAT  # Code status: FULL  # Disposition: from home, floors for now .  possible d/c home w/ O2 in 24-48 hours. CHECK WALKING SATS TOMORROW.   
32 y/o  - female w/ PMHx of anxiety presents to ED for COVID 19 / Abd pain & Vaginal bleeding    IMPRESSION;  COVID 19 with resolved severe illness. SpO2 < 94% on RA and presently no need for supplemental O2.  Pt was inbetween the early viral replicative phase and the late immune mediated phase based on the onset of symptoms.   No significant elevation of the inflammatory markers   procalcitonin 0.09, not suggestive of a bacterial PNA.  Ferritin 132  CRP 58  Ddimers 254Doubt candidal vagingitis    RECOMMENDATIONS;  Target SpO2 92 % to 96 %  D/c RDV after todays dose  Could finish course of steroids with po prednisone 40 mg q24 for a total of 10 days   Could d/c home  recall prn please

## 2021-08-04 NOTE — DISCHARGE NOTE NURSING/CASE MANAGEMENT/SOCIAL WORK - NSDCVIVACCINE_GEN_ALL_CORE_FT
MMR; 24-Jul-2019 10:53; Pallarino, Loriann (RN); [employer] [id number] (Exp. Date: 17-Dec-2020); SubCutaneous; Arm Left; 0.5 milliLiter(s); VIS (VIS Published: 20-Apr-2012, VIS Presented: 24-Jul-2019);

## 2021-08-04 NOTE — DISCHARGE NOTE PROVIDER - CARE PROVIDER_API CALL
Neda Villagomez)  Internal Medicine  [address] [place] [address]  Phone: [phone]  Fax: [phone]  Follow Up Time: 2 weeks   Donny Winters (DO)  Medicine  Physicians  [address] [facility], [address] [place] [address]  Phone: [phone]  Fax: [phone]  Scheduled Appointment: 08/12/2021 08:30 AM

## 2021-08-04 NOTE — DISCHARGE NOTE PROVIDER - NSDCCPCAREPLAN_GEN_ALL_CORE_FT
PRINCIPAL DISCHARGE DIAGNOSIS  Diagnosis: COVID-19  Assessment and Plan of Treatment:        PRINCIPAL DISCHARGE DIAGNOSIS  Diagnosis: COVID-19  Assessment and Plan of Treatment: Coronavirus disease 2019 (COVID-19) is a respiratory illness  that can spread from person to person. The virus that causes  COVID-19 is a novel coronavirus that was first identified during  an investigation into an outbreak in [place], [place].  The virus that causes COVID-19 probably emerged from an  animal source, but is now spreading from person to person.  The virus is thought to spread mainly between people who  are in close contact with one another (within about 6 feet)  through respiratory droplets produced when an infected  person coughs or sneezes. It also may be possible that a person  can get COVID-19 by touching a surface or object that has  the virus on it and then touching their own mouth, nose, or  possibly their eyes, but this is not thought to be the main  way the virus spreads.  Please stay home and avoid contact with others for at least a week after symptoms resolve and follow government guidelines.   Patients with COVID-19 have had mild to severe respiratory  illness with symptoms of  • fever  • cough  • shortness of breath  People can help protect themselves from respiratory illness with  everyday preventive actions.    • Avoid close contact with people who are sick.  • Avoid touching your eyes, nose, and mouth with  unwashed hands.  • Wash your hands often with soap and water for at least 20   seconds. Use an alcohol-based hand  that contains at  least 60% alcohol if soap and water are not available.   Stay home when you are sick.  • Cover your cough or sneeze with a tissue, then throw the  tissue in the trash.  • Clean and disinfect frequently touched objects  and surfaces.  Call 911 and inform them you are covid positive before you decide to go to the emergency room if you have chest pain, difficulty breathing, high fevers, worsening of your symptoms, feel unwell, or have nausea and vomiting.

## 2021-08-04 NOTE — PROGRESS NOTE ADULT - REASON FOR ADMISSION
GYN Bleeding and Fever (+COVID19)

## 2021-08-04 NOTE — DISCHARGE NOTE PROVIDER - HOSPITAL COURSE
Hospital Course  31 year old Female  with recent discontinuation of pregnancy at 20 weeks for fetal abnormalities, and anxiety presented with fever, cramping abdominal pain, and vaginal bleeding x 1week. Pt was also tested outpatient for covid on  and was POSITIVE.  Pt has had fever tmax 104, nausea, vomiting, diarrhea, cough, chest pain and sob on deep inspiration. Pt states has cramping intermittent abdominal pain, intermittent vaginal bleeding, and lower back pain at epidural injection site.  Came in today due to worsening sob. sts entire fam is covid + pt is unvaccinated. No recent travel, leg pain/swelling, hx of dvt/pe, hormone therapy.    8: Started on 10-day course of decadron.  CXR showed patchy bilateral opacities worse in LL.  Inflammatory markers: ddimer 254, cRP 58, ferritin 132, procal 0.09, and lactate 1.0.  8/3: Patient saturating appropriately on room air.  No complaints this morning.  Cleared for discharge to home.

## 2021-08-04 NOTE — DISCHARGE NOTE PROVIDER - NSDCMRMEDTOKEN_GEN_ALL_CORE_FT
Lexapro 20 mg oral tablet: 1 tab(s) orally once a day  predniSONE 20 mg oral tablet: 2 tab(s) orally once a day    Centrum Women&#x27;s oral tablet: 1 tab(s) orally once a day   Lexapro 20 mg oral tablet: 1 tab(s) orally once a day  predniSONE 20 mg oral tablet: 2 tab(s) orally once a day

## 2021-08-04 NOTE — PROGRESS NOTE ADULT - SUBJECTIVE AND OBJECTIVE BOX
FRANCE LLANOS  31y, Female    All available historical data reviewed    OVERNIGHT EVENTS:  no fevers  feels well and has no complaints  95% RA    ROS:  General: Denies rigors, nightsweats  HEENT: Denies headache, rhinorrhea, sore throat, eye pain  CV: Denies CP, palpitations  PULM: Denies wheezing, hemoptysis  GI: Denies hematemesis, hematochezia, melena  : Denies discharge, hematuria  MSK: Denies arthralgias, myalgias  SKIN: Denies rash, lesions  NEURO: Denies paresthesias, weakness  PSYCH: Denies depression, anxiety    VITALS:  T(F): 97, Max: 97 (08-04-21 @ 05:00)  HR: 48  BP: 101/65  RR: 18Vital Signs Last 24 Hrs  T(C): 36.1 (04 Aug 2021 05:00), Max: 36.1 (04 Aug 2021 05:00)  T(F): 97 (04 Aug 2021 05:00), Max: 97 (04 Aug 2021 05:00)  HR: 48 (04 Aug 2021 05:00) (48 - 60)  BP: 101/65 (04 Aug 2021 05:00) (98/62 - 101/65)  BP(mean): --  RR: 18 (04 Aug 2021 05:00) (18 - 18)  SpO2: 95% (04 Aug 2021 06:53) (94% - 98%)    TESTS & MEASUREMENTS:                        12.1   5.67  )-----------( 155      ( 03 Aug 2021 08:23 )             37.4     08-03    140  |  103  |  6<L>  ----------------------------<  101<H>  5.4<H>   |  29  |  0.5<L>    Ca    8.5      03 Aug 2021 08:23  Mg     1.9     08-03    TPro  5.9<L>  /  Alb  3.5  /  TBili  <0.2  /  DBili  x   /  AST  35  /  ALT  32  /  AlkPhos  104  08-03    LIVER FUNCTIONS - ( 03 Aug 2021 08:23 )  Alb: 3.5 g/dL / Pro: 5.9 g/dL / ALK PHOS: 104 U/L / ALT: 32 U/L / AST: 35 U/L / GGT: x                   RADIOLOGY & ADDITIONAL TESTS:  Personal review of radiological diagnostics performed  Echo and EKG results noted when applicable.     MEDICATIONS:  acetaminophen   Tablet .. 650 milliGRAM(s) Oral every 4 hours PRN  ALBUTerol    90 MICROgram(s) HFA Inhaler 2 Puff(s) Inhalation every 4 hours PRN  ascorbic acid 500 milliGRAM(s) Oral daily  chlorhexidine 4% Liquid 1 Application(s) Topical two times a day  cholecalciferol 1000 Unit(s) Oral daily  dexAMETHasone  Injectable 6 milliGRAM(s) IV Push daily  enoxaparin Injectable 40 milliGRAM(s) SubCutaneous daily  escitalopram 20 milliGRAM(s) Oral daily  guaifenesin/dextromethorphan Oral Liquid 10 milliLiter(s) Oral every 4 hours PRN  ibuprofen  Tablet. 600 milliGRAM(s) Oral every 6 hours PRN  melatonin 5 milliGRAM(s) Oral at bedtime  ondansetron Injectable 4 milliGRAM(s) IV Push every 6 hours PRN      ANTIBIOTICS:

## 2021-08-04 NOTE — DISCHARGE NOTE PROVIDER - PROVIDER TOKENS
PROVIDER:[TOKEN:[[id number]],FOLLOWUP:[2 weeks]] PROVIDER:[TOKEN:[[id number]],SCHEDULEDAPPT:[08/12/2021],SCHEDULEDAPPTTIME:[08:30 AM]]

## 2021-08-05 ENCOUNTER — TRANSCRIPTION ENCOUNTER (OUTPATIENT)
Age: 31
End: 2021-08-05

## 2021-08-09 ENCOUNTER — NON-APPOINTMENT (OUTPATIENT)
Age: 31
End: 2021-08-09

## 2021-08-10 DIAGNOSIS — J96.01 ACUTE RESPIRATORY FAILURE WITH HYPOXIA: ICD-10-CM

## 2021-08-10 DIAGNOSIS — F41.9 ANXIETY DISORDER, UNSPECIFIED: ICD-10-CM

## 2021-08-10 DIAGNOSIS — N93.9 ABNORMAL UTERINE AND VAGINAL BLEEDING, UNSPECIFIED: ICD-10-CM

## 2021-08-10 DIAGNOSIS — J12.82 PNEUMONIA DUE TO CORONAVIRUS DISEASE 2019: ICD-10-CM

## 2021-08-10 DIAGNOSIS — U07.1 COVID-19: ICD-10-CM

## 2021-08-10 DIAGNOSIS — R07.9 CHEST PAIN, UNSPECIFIED: ICD-10-CM

## 2021-08-11 LAB — CHROM ANALY OVERALL INTERP SPEC-IMP: SIGNIFICANT CHANGE UP

## 2021-08-12 ENCOUNTER — APPOINTMENT (OUTPATIENT)
Dept: INTERNAL MEDICINE | Facility: CLINIC | Age: 31
End: 2021-08-12

## 2021-08-12 ENCOUNTER — NON-APPOINTMENT (OUTPATIENT)
Age: 31
End: 2021-08-12

## 2021-08-17 ENCOUNTER — NON-APPOINTMENT (OUTPATIENT)
Age: 31
End: 2021-08-17

## 2021-08-17 PROBLEM — O03.9 COMPLETE OR UNSPECIFIED SPONTANEOUS ABORTION WITHOUT COMPLICATION: Chronic | Status: ACTIVE | Noted: 2021-08-01

## 2021-08-17 PROBLEM — Z86.59 PERSONAL HISTORY OF OTHER MENTAL AND BEHAVIORAL DISORDERS: Chronic | Status: ACTIVE | Noted: 2021-08-01

## 2021-08-18 ENCOUNTER — NON-APPOINTMENT (OUTPATIENT)
Age: 31
End: 2021-08-18

## 2021-08-24 ENCOUNTER — APPOINTMENT (OUTPATIENT)
Dept: OBGYN | Facility: CLINIC | Age: 31
End: 2021-08-24
Payer: MEDICAID

## 2021-08-24 VITALS — HEIGHT: 69 IN | WEIGHT: 170 LBS | BODY MASS INDEX: 25.18 KG/M2 | TEMPERATURE: 98.7 F

## 2021-08-24 PROCEDURE — 99024 POSTOP FOLLOW-UP VISIT: CPT

## 2021-09-12 ENCOUNTER — FORM ENCOUNTER (OUTPATIENT)
Age: 31
End: 2021-09-12

## 2021-09-14 LAB — MISCELLANEOUS TEST NAME: SIGNIFICANT CHANGE UP

## 2021-09-20 ENCOUNTER — APPOINTMENT (OUTPATIENT)
Dept: ANTEPARTUM | Facility: CLINIC | Age: 31
End: 2021-09-20

## 2021-09-20 ENCOUNTER — OUTPATIENT (OUTPATIENT)
Dept: OUTPATIENT SERVICES | Facility: HOSPITAL | Age: 31
LOS: 1 days | Discharge: HOME | End: 2021-09-20

## 2021-09-28 NOTE — HISTORY OF PRESENT ILLNESS
[Home] : at home, [unfilled] , at the time of the visit. [Medical Office: ([facility])___] : at the medical office located in  [Spouse] : spouse [Verbal consent obtained from patient] : the patient, [unfilled]

## 2021-10-02 ENCOUNTER — NON-APPOINTMENT (OUTPATIENT)
Age: 31
End: 2021-10-02

## 2021-10-05 LAB — MISCELLANEOUS TEST NAME: SIGNIFICANT CHANGE UP

## 2021-10-15 NOTE — OB PROVIDER TRIAGE NOTE - NS_FETALHEARTHEAR_OBGYN_ALL_OB
Date of Service: 10/15/2021    ENDOSCOPIST:  Winetr Tomas MD     ASSISTANT:  None     PROCEDURE: Colonoscopy     INSTRUMENT:  Olympus video colonoscope.     EXTENT OF EXAMINATION:  terminal ileum     LIMITATION OF EXAMINATION:  None.     MEDICATIONS:  MAC     PREOPERATIVE DIAGNOSIS:    Screening for colorectal cancer    POSTOPERATIVE DIAGNOSIS:   1. No polyps or mass lesions  2. Small internal hemorrhoids and skin tags  3. Normal-appearing terminal ileum  4. Withdrawal time was greater than 10 minutes     DESCRIPTION OF PROCEDURE:   After explaining risks, benefits and alternatives to the patient including risk of perforation, bleeding, sedation and risk of missing polyps and mass lesions, a written consent was obtained.  The patient was placed in left lateral decubitus position and appropriate sedation was given.  Digital rectal examination was done which showed normal tone and no mass.  Scope was inserted in the rectum and all the way to the cecum with no difficulties.  Cecum was identified by the ileocecal valve and the appendiceal orifice.  Cecum, ascending, transverse, descending and sigmoid colon were examined very carefully upon inserting and withdrawal of the scope where no significant pathology was identified. Terminal ileum was intubated and appeared within normal limits. Retroflexion in the rectum demonstrated no significant pathology except for small internal hemorrhoids and skin tags. The procedure was tolerated well and terminated with no complications.    DIAGNOSIS AND IMPRESSION:  Colonoscopy for screening purposes with no evidence of polyps or mass lesions.  Repeat colonoscopy is suggested in 10 years according to the current guidelines    SPECIMEN:  None    ESTIMATED BLOOD LOSS:  None     COMPLICATIONS:  None.     DISPOSITION:  The patient to be allowed to recover in the recovery area per protocols.           Yes

## 2022-01-01 ENCOUNTER — EMERGENCY (EMERGENCY)
Facility: HOSPITAL | Age: 32
LOS: 0 days | Discharge: HOME | End: 2022-01-02
Attending: EMERGENCY MEDICINE | Admitting: EMERGENCY MEDICINE
Payer: COMMERCIAL

## 2022-01-01 VITALS
HEIGHT: 69 IN | OXYGEN SATURATION: 100 % | TEMPERATURE: 99 F | SYSTOLIC BLOOD PRESSURE: 103 MMHG | HEART RATE: 67 BPM | WEIGHT: 164.91 LBS | RESPIRATION RATE: 20 BRPM | DIASTOLIC BLOOD PRESSURE: 64 MMHG

## 2022-01-01 DIAGNOSIS — R11.0 NAUSEA: ICD-10-CM

## 2022-01-01 DIAGNOSIS — R35.0 FREQUENCY OF MICTURITION: ICD-10-CM

## 2022-01-01 DIAGNOSIS — K80.50 CALCULUS OF BILE DUCT WITHOUT CHOLANGITIS OR CHOLECYSTITIS WITHOUT OBSTRUCTION: ICD-10-CM

## 2022-01-01 DIAGNOSIS — R10.13 EPIGASTRIC PAIN: ICD-10-CM

## 2022-01-01 LAB
ALBUMIN SERPL ELPH-MCNC: 4.4 G/DL — SIGNIFICANT CHANGE UP (ref 3.5–5.2)
ALP SERPL-CCNC: 113 U/L — SIGNIFICANT CHANGE UP (ref 30–115)
ALT FLD-CCNC: 20 U/L — SIGNIFICANT CHANGE UP (ref 0–41)
ANION GAP SERPL CALC-SCNC: 12 MMOL/L — SIGNIFICANT CHANGE UP (ref 7–14)
APPEARANCE UR: CLEAR — SIGNIFICANT CHANGE UP
AST SERPL-CCNC: 17 U/L — SIGNIFICANT CHANGE UP (ref 0–41)
BASOPHILS # BLD AUTO: 0.04 K/UL — SIGNIFICANT CHANGE UP (ref 0–0.2)
BASOPHILS NFR BLD AUTO: 0.5 % — SIGNIFICANT CHANGE UP (ref 0–1)
BILIRUB SERPL-MCNC: 0.3 MG/DL — SIGNIFICANT CHANGE UP (ref 0.2–1.2)
BILIRUB UR-MCNC: NEGATIVE — SIGNIFICANT CHANGE UP
BUN SERPL-MCNC: 11 MG/DL — SIGNIFICANT CHANGE UP (ref 10–20)
CALCIUM SERPL-MCNC: 8.9 MG/DL — SIGNIFICANT CHANGE UP (ref 8.5–10.1)
CHLORIDE SERPL-SCNC: 100 MMOL/L — SIGNIFICANT CHANGE UP (ref 98–110)
CO2 SERPL-SCNC: 22 MMOL/L — SIGNIFICANT CHANGE UP (ref 17–32)
COLOR SPEC: YELLOW — SIGNIFICANT CHANGE UP
CREAT SERPL-MCNC: 0.7 MG/DL — SIGNIFICANT CHANGE UP (ref 0.7–1.5)
DIFF PNL FLD: NEGATIVE — SIGNIFICANT CHANGE UP
EOSINOPHIL # BLD AUTO: 0.2 K/UL — SIGNIFICANT CHANGE UP (ref 0–0.7)
EOSINOPHIL NFR BLD AUTO: 2.3 % — SIGNIFICANT CHANGE UP (ref 0–8)
GLUCOSE SERPL-MCNC: 93 MG/DL — SIGNIFICANT CHANGE UP (ref 70–99)
GLUCOSE UR QL: NEGATIVE MG/DL — SIGNIFICANT CHANGE UP
HCG SERPL QL: NEGATIVE — SIGNIFICANT CHANGE UP
HCT VFR BLD CALC: 38.6 % — SIGNIFICANT CHANGE UP (ref 37–47)
HGB BLD-MCNC: 12.7 G/DL — SIGNIFICANT CHANGE UP (ref 12–16)
IMM GRANULOCYTES NFR BLD AUTO: 0.3 % — SIGNIFICANT CHANGE UP (ref 0.1–0.3)
KETONES UR-MCNC: NEGATIVE — SIGNIFICANT CHANGE UP
LEUKOCYTE ESTERASE UR-ACNC: NEGATIVE — SIGNIFICANT CHANGE UP
LIDOCAIN IGE QN: 30 U/L — SIGNIFICANT CHANGE UP (ref 7–60)
LYMPHOCYTES # BLD AUTO: 3.02 K/UL — SIGNIFICANT CHANGE UP (ref 1.2–3.4)
LYMPHOCYTES # BLD AUTO: 34.6 % — SIGNIFICANT CHANGE UP (ref 20.5–51.1)
MCHC RBC-ENTMCNC: 31.1 PG — HIGH (ref 27–31)
MCHC RBC-ENTMCNC: 32.9 G/DL — SIGNIFICANT CHANGE UP (ref 32–37)
MCV RBC AUTO: 94.6 FL — SIGNIFICANT CHANGE UP (ref 81–99)
MONOCYTES # BLD AUTO: 0.67 K/UL — HIGH (ref 0.1–0.6)
MONOCYTES NFR BLD AUTO: 7.7 % — SIGNIFICANT CHANGE UP (ref 1.7–9.3)
NEUTROPHILS # BLD AUTO: 4.77 K/UL — SIGNIFICANT CHANGE UP (ref 1.4–6.5)
NEUTROPHILS NFR BLD AUTO: 54.6 % — SIGNIFICANT CHANGE UP (ref 42.2–75.2)
NITRITE UR-MCNC: NEGATIVE — SIGNIFICANT CHANGE UP
NRBC # BLD: 0 /100 WBCS — SIGNIFICANT CHANGE UP (ref 0–0)
PH UR: 6.5 — SIGNIFICANT CHANGE UP (ref 5–8)
PLATELET # BLD AUTO: 203 K/UL — SIGNIFICANT CHANGE UP (ref 130–400)
POTASSIUM SERPL-MCNC: 4.3 MMOL/L — SIGNIFICANT CHANGE UP (ref 3.5–5)
POTASSIUM SERPL-SCNC: 4.3 MMOL/L — SIGNIFICANT CHANGE UP (ref 3.5–5)
PROT SERPL-MCNC: 6.8 G/DL — SIGNIFICANT CHANGE UP (ref 6–8)
PROT UR-MCNC: NEGATIVE MG/DL — SIGNIFICANT CHANGE UP
RBC # BLD: 4.08 M/UL — LOW (ref 4.2–5.4)
RBC # FLD: 11.7 % — SIGNIFICANT CHANGE UP (ref 11.5–14.5)
SODIUM SERPL-SCNC: 134 MMOL/L — LOW (ref 135–146)
SP GR SPEC: 1.02 — SIGNIFICANT CHANGE UP (ref 1.01–1.03)
UROBILINOGEN FLD QL: 0.2 MG/DL — SIGNIFICANT CHANGE UP
WBC # BLD: 8.73 K/UL — SIGNIFICANT CHANGE UP (ref 4.8–10.8)
WBC # FLD AUTO: 8.73 K/UL — SIGNIFICANT CHANGE UP (ref 4.8–10.8)

## 2022-01-01 PROCEDURE — 76705 ECHO EXAM OF ABDOMEN: CPT | Mod: 26

## 2022-01-01 PROCEDURE — 99285 EMERGENCY DEPT VISIT HI MDM: CPT

## 2022-01-01 RX ORDER — FAMOTIDINE 10 MG/ML
20 INJECTION INTRAVENOUS ONCE
Refills: 0 | Status: COMPLETED | OUTPATIENT
Start: 2022-01-01 | End: 2022-01-01

## 2022-01-01 RX ORDER — ONDANSETRON 8 MG/1
4 TABLET, FILM COATED ORAL ONCE
Refills: 0 | Status: COMPLETED | OUTPATIENT
Start: 2022-01-01 | End: 2022-01-01

## 2022-01-01 RX ORDER — SODIUM CHLORIDE 9 MG/ML
1000 INJECTION INTRAMUSCULAR; INTRAVENOUS; SUBCUTANEOUS ONCE
Refills: 0 | Status: COMPLETED | OUTPATIENT
Start: 2022-01-01 | End: 2022-01-01

## 2022-01-01 RX ADMIN — SODIUM CHLORIDE 1000 MILLILITER(S): 9 INJECTION INTRAMUSCULAR; INTRAVENOUS; SUBCUTANEOUS at 22:12

## 2022-01-01 RX ADMIN — Medication 30 MILLILITER(S): at 22:35

## 2022-01-01 RX ADMIN — ONDANSETRON 4 MILLIGRAM(S): 8 TABLET, FILM COATED ORAL at 22:36

## 2022-01-01 RX ADMIN — FAMOTIDINE 104 MILLIGRAM(S): 10 INJECTION INTRAVENOUS at 22:35

## 2022-01-01 NOTE — ED ADULT TRIAGE NOTE - TEMPERATURE IN CELSIUS (DEGREES C)
Called to speak to [place].  No answer and her voicemail is full.  Will need to know if she is having pain again now.   37.1

## 2022-01-01 NOTE — ED PROVIDER NOTE - OBJECTIVE STATEMENT
30 yo female no sig hx present c/o epigastric pain off/on since Thanksgiving. Felt pain recurred more frequently in the past 2 weeks. eating sometimes exacerbated the pain/bloating. pain associates with nausea.   Denies fever/chill/vomiting/diarrhea/change of appetite/constipation and urinary sxs. Denies HA/dizziness/chest pain/sob/palpitations and pain to extremities and ambulatory difficulty.

## 2022-01-01 NOTE — ED PROVIDER NOTE - PHYSICAL EXAMINATION
CONSTITUTIONAL: Well-appearing; well-nourished; in no apparent distress.   EYES: PERRL; EOM intact.   CARDIOVASCULAR: Normal S1, S2; no murmurs, rubs, or gallops.   RESPIRATORY: Normal chest excursion with respiration; breath sounds clear and equal bilaterally; no wheezes, rhonchi, or rales.  GI: + mild epigastric tenderness. Normal bowel sounds; non-distended; no palpable organomegaly.   MS: No calf swelling and tenderness.  SKIN: Normal for age and race; warm; dry; good turgor; no apparent lesions or exudate.   NEURO/PSYCH: A & O x 4; grossly unremarkable.

## 2022-01-01 NOTE — ED PROVIDER NOTE - CARE PROVIDER_API CALL
Estefania Valdivia)  Surgery  [address] [place] [address]  Phone: [phone]  Fax: [phone]  Follow Up Time:

## 2022-01-01 NOTE — ED PROVIDER NOTE - CLINICAL SUMMARY MEDICAL DECISION MAKING FREE TEXT BOX
pt with biliary colic without evidence of infection or obstruction, abd snt pain controlled po tolerant will d/c diet modification, surgical f/u. Patient counseled regarding conditions which should prompt return.

## 2022-01-01 NOTE — ED PROVIDER NOTE - NSFOLLOWUPINSTRUCTIONS_ED_ALL_ED_FT
Biliary Colic    WHAT YOU NEED TO KNOW:    Biliary colic is severe pain in your upper abdomen caused by a gallbladder problem. Your gallbladder stores bile, which helps break down the fats that you eat. Gallbladder     DISCHARGE INSTRUCTIONS:    Medicines:     Medicines can help decrease pain and muscle spasms. You may also need medicine to calm your stomach and stop vomiting.    Take your medicine as directed. Contact your healthcare provider if you think your medicine is not helping or you have side effects. Tell him if you are allergic to any medicine. Keep a list of the medicines, vitamins, and herbs you take. Include the amounts, and when and why you take them. Bring the list or the pill bottles to follow-up visits. Carry your medicine list with you in case of an emergency.    Avoid alcohol: Alcohol can damage your gallbladder and make your symptoms worse.    Maintain a healthy weight: Ask your healthcare provider how much you should weigh. Ask him to help you create a weight loss plan if you are overweight.     Eat a variety of healthy foods: Healthy foods include fruits, vegetables, whole-grain breads, low-fat dairy products, beans, lean meats, and fish. Foods that are high in fiber and low in fat and cholesterol may decrease your symptoms. Ask if you need to be on a special diet.    Exercise: Ask your healthcare provider about the best exercise plan for you. Exercise may help improve your symptoms.    Follow up with your healthcare provider as directed: Bring a list of any questions you have so you remember to ask them during your visits.     Contact your healthcare provider if:   You have a fever.  Your pain gets worse, even after you take medicine.  You have nausea or are vomiting.  Your skin or eyes are yellow.  You have questions or concerns about your condition or care.    Return to the emergency department if:   You have severe pain.  You feel like you are going to faint.  You are short of breath.

## 2022-01-01 NOTE — ED PROVIDER NOTE - PATIENT PORTAL LINK FT
You can access the FollowMyHealth Patient Portal offered by [facility] by registering at the following website: [email or web address]. By joining [email or web address] [email or web address] portal, you will also be able to view your health information using other applications (apps) compatible with our system.

## 2022-01-04 ENCOUNTER — NON-APPOINTMENT (OUTPATIENT)
Age: 32
End: 2022-01-04

## 2022-01-06 ENCOUNTER — APPOINTMENT (OUTPATIENT)
Dept: SURGERY | Facility: CLINIC | Age: 32
End: 2022-01-06

## 2022-01-12 ENCOUNTER — OUTPATIENT (OUTPATIENT)
Dept: OUTPATIENT SERVICES | Facility: HOSPITAL | Age: 32
LOS: 1 days | Discharge: HOME | End: 2022-01-12

## 2022-01-12 VITALS
TEMPERATURE: 97 F | DIASTOLIC BLOOD PRESSURE: 55 MMHG | HEART RATE: 60 BPM | HEIGHT: 69 IN | OXYGEN SATURATION: 97 % | WEIGHT: 174.61 LBS | RESPIRATION RATE: 18 BRPM | SYSTOLIC BLOOD PRESSURE: 108 MMHG

## 2022-01-12 DIAGNOSIS — K80.20 CALCULUS OF GALLBLADDER WITHOUT CHOLECYSTITIS WITHOUT OBSTRUCTION: ICD-10-CM

## 2022-01-12 DIAGNOSIS — Z01.818 ENCOUNTER FOR OTHER PREPROCEDURAL EXAMINATION: ICD-10-CM

## 2022-01-12 DIAGNOSIS — Z98.890 OTHER SPECIFIED POSTPROCEDURAL STATES: Chronic | ICD-10-CM

## 2022-01-12 LAB
ALBUMIN SERPL ELPH-MCNC: 4.5 G/DL — SIGNIFICANT CHANGE UP (ref 3.5–5.2)
ALP SERPL-CCNC: 110 U/L — SIGNIFICANT CHANGE UP (ref 30–115)
ALT FLD-CCNC: 14 U/L — SIGNIFICANT CHANGE UP (ref 0–41)
ANION GAP SERPL CALC-SCNC: 14 MMOL/L — SIGNIFICANT CHANGE UP (ref 7–14)
APTT BLD: 31 SEC — SIGNIFICANT CHANGE UP (ref 27–39.2)
AST SERPL-CCNC: 15 U/L — SIGNIFICANT CHANGE UP (ref 0–41)
BASOPHILS # BLD AUTO: 0.03 K/UL — SIGNIFICANT CHANGE UP (ref 0–0.2)
BASOPHILS NFR BLD AUTO: 0.4 % — SIGNIFICANT CHANGE UP (ref 0–1)
BILIRUB SERPL-MCNC: 0.5 MG/DL — SIGNIFICANT CHANGE UP (ref 0.2–1.2)
BUN SERPL-MCNC: 16 MG/DL — SIGNIFICANT CHANGE UP (ref 10–20)
CALCIUM SERPL-MCNC: 9.2 MG/DL — SIGNIFICANT CHANGE UP (ref 8.5–10.1)
CHLORIDE SERPL-SCNC: 102 MMOL/L — SIGNIFICANT CHANGE UP (ref 98–110)
CO2 SERPL-SCNC: 23 MMOL/L — SIGNIFICANT CHANGE UP (ref 17–32)
CREAT SERPL-MCNC: 0.7 MG/DL — SIGNIFICANT CHANGE UP (ref 0.7–1.5)
EOSINOPHIL # BLD AUTO: 0.18 K/UL — SIGNIFICANT CHANGE UP (ref 0–0.7)
EOSINOPHIL NFR BLD AUTO: 2.4 % — SIGNIFICANT CHANGE UP (ref 0–8)
GLUCOSE SERPL-MCNC: 84 MG/DL — SIGNIFICANT CHANGE UP (ref 70–99)
HCT VFR BLD CALC: 41 % — SIGNIFICANT CHANGE UP (ref 37–47)
HGB BLD-MCNC: 13.2 G/DL — SIGNIFICANT CHANGE UP (ref 12–16)
IMM GRANULOCYTES NFR BLD AUTO: 0.4 % — HIGH (ref 0.1–0.3)
INR BLD: 0.93 RATIO — SIGNIFICANT CHANGE UP (ref 0.65–1.3)
LYMPHOCYTES # BLD AUTO: 1.93 K/UL — SIGNIFICANT CHANGE UP (ref 1.2–3.4)
LYMPHOCYTES # BLD AUTO: 25.4 % — SIGNIFICANT CHANGE UP (ref 20.5–51.1)
MCHC RBC-ENTMCNC: 31.2 PG — HIGH (ref 27–31)
MCHC RBC-ENTMCNC: 32.2 G/DL — SIGNIFICANT CHANGE UP (ref 32–37)
MCV RBC AUTO: 96.9 FL — SIGNIFICANT CHANGE UP (ref 81–99)
MONOCYTES # BLD AUTO: 0.54 K/UL — SIGNIFICANT CHANGE UP (ref 0.1–0.6)
MONOCYTES NFR BLD AUTO: 7.1 % — SIGNIFICANT CHANGE UP (ref 1.7–9.3)
NEUTROPHILS # BLD AUTO: 4.88 K/UL — SIGNIFICANT CHANGE UP (ref 1.4–6.5)
NEUTROPHILS NFR BLD AUTO: 64.3 % — SIGNIFICANT CHANGE UP (ref 42.2–75.2)
NRBC # BLD: 0 /100 WBCS — SIGNIFICANT CHANGE UP (ref 0–0)
PLATELET # BLD AUTO: 214 K/UL — SIGNIFICANT CHANGE UP (ref 130–400)
POTASSIUM SERPL-MCNC: 4.6 MMOL/L — SIGNIFICANT CHANGE UP (ref 3.5–5)
POTASSIUM SERPL-SCNC: 4.6 MMOL/L — SIGNIFICANT CHANGE UP (ref 3.5–5)
PROT SERPL-MCNC: 7.2 G/DL — SIGNIFICANT CHANGE UP (ref 6–8)
PROTHROM AB SERPL-ACNC: 10.7 SEC — SIGNIFICANT CHANGE UP (ref 9.95–12.87)
RBC # BLD: 4.23 M/UL — SIGNIFICANT CHANGE UP (ref 4.2–5.4)
RBC # FLD: 11.8 % — SIGNIFICANT CHANGE UP (ref 11.5–14.5)
SODIUM SERPL-SCNC: 139 MMOL/L — SIGNIFICANT CHANGE UP (ref 135–146)
WBC # BLD: 7.59 K/UL — SIGNIFICANT CHANGE UP (ref 4.8–10.8)
WBC # FLD AUTO: 7.59 K/UL — SIGNIFICANT CHANGE UP (ref 4.8–10.8)

## 2022-01-12 NOTE — H&P PST ADULT - NSICDXFAMILYHX_GEN_ALL_CORE_FT
FAMILY HISTORY:  Father  Still living? No  FH: HTN (hypertension), Age at diagnosis: Age Unknown    Grandparent  Still living? Unknown  Family history of breast cancer, Age at diagnosis: Age Unknown

## 2022-01-12 NOTE — H&P PST ADULT - REASON FOR ADMISSION
30 Y/O F SCHEDULED FOR PAST LAPAROSCOPIC CHOLECYSTECTOMY POSSIBLE OPEN ON 1/17/22 WITH DR MATHEWS UNDER GA. PT REPORTS RECENT EPIGASTRIC PAIN WORSENED WITH FRIED FOOD. PAIN WAS SO SEVERE, 10/10,  THAT IT BROUGHT HER TO THE ER. WORKUP REVEALED CHOLOLITHIASIS AND GALLBLADDER SLUDGE. NOW FOR SCHEDULED PROCEDURE.

## 2022-01-12 NOTE — H&P PST ADULT - NSANTHOSAYNRD_GEN_A_CORE
No. LEONORA screening performed.  STOP BANG Legend: 0-2 = LOW Risk; 3-4 = INTERMEDIATE Risk; 5-8 = HIGH Risk

## 2022-01-12 NOTE — H&P PST ADULT - HISTORY OF PRESENT ILLNESS
CURRENTLY  DENIES ANY CP, SOB, PALPITATIONS, COUGH OR DYSURIA  EXERCISE TOLERANCE 2-3 FOS WITHOUT SOB    AS PER PATIENT  this is his/her complete medical history including medications - PRESCRIPTIONS  OVER THE COUNTER MEDS      pt denies any covid s/s,  7/2021 tested positive for covid.   pt advised self quarantine till day of procedure    Anesthesia Alert  NO--Difficult Airway  NO--History of neck surgery or radiation  NO--Limited ROM of neck  NO--History of Malignant hyperthermia  NO--No personal or family history of Pseudocholinesterase deficiency.  NO--Prior Anesthesia Complication  NO--Latex Allergy  NO--Loose teeth  NO--History of Rheumatoid Arthritis  NO--LEONORA  NO--Bleeding risk  NO--Other_____    Patient verbalized understanding of instructions and was given the opportunity to ask questions and have them answered.

## 2022-01-17 ENCOUNTER — RESULT REVIEW (OUTPATIENT)
Age: 32
End: 2022-01-17

## 2022-01-17 ENCOUNTER — OUTPATIENT (OUTPATIENT)
Dept: OUTPATIENT SERVICES | Facility: HOSPITAL | Age: 32
LOS: 1 days | Discharge: HOME | End: 2022-01-17
Payer: MEDICAID

## 2022-01-17 VITALS
OXYGEN SATURATION: 97 % | RESPIRATION RATE: 16 BRPM | TEMPERATURE: 98 F | WEIGHT: 169.98 LBS | SYSTOLIC BLOOD PRESSURE: 96 MMHG | HEART RATE: 60 BPM | DIASTOLIC BLOOD PRESSURE: 73 MMHG | HEIGHT: 69 IN

## 2022-01-17 VITALS — DIASTOLIC BLOOD PRESSURE: 53 MMHG | HEART RATE: 66 BPM | RESPIRATION RATE: 17 BRPM | SYSTOLIC BLOOD PRESSURE: 113 MMHG

## 2022-01-17 DIAGNOSIS — Z98.890 OTHER SPECIFIED POSTPROCEDURAL STATES: Chronic | ICD-10-CM

## 2022-01-17 PROCEDURE — 88304 TISSUE EXAM BY PATHOLOGIST: CPT | Mod: 26

## 2022-01-17 RX ORDER — SODIUM CHLORIDE 9 MG/ML
1000 INJECTION, SOLUTION INTRAVENOUS
Refills: 0 | Status: DISCONTINUED | OUTPATIENT
Start: 2022-01-17 | End: 2022-01-31

## 2022-01-17 RX ORDER — HYDROMORPHONE HYDROCHLORIDE 2 MG/ML
0.5 INJECTION INTRAMUSCULAR; INTRAVENOUS; SUBCUTANEOUS
Refills: 0 | Status: DISCONTINUED | OUTPATIENT
Start: 2022-01-17 | End: 2022-01-17

## 2022-01-17 RX ORDER — FAMOTIDINE 10 MG/ML
20 INJECTION INTRAVENOUS ONCE
Refills: 0 | Status: COMPLETED | OUTPATIENT
Start: 2022-01-17 | End: 2022-01-17

## 2022-01-17 RX ORDER — MORPHINE SULFATE 50 MG/1
2 CAPSULE, EXTENDED RELEASE ORAL
Refills: 0 | Status: DISCONTINUED | OUTPATIENT
Start: 2022-01-17 | End: 2022-01-17

## 2022-01-17 RX ORDER — OXYCODONE HYDROCHLORIDE 5 MG/1
1 TABLET ORAL
Qty: 10 | Refills: 0
Start: 2022-01-17

## 2022-01-17 RX ORDER — PROCHLORPERAZINE MALEATE 5 MG
5 TABLET ORAL ONCE
Refills: 0 | Status: COMPLETED | OUTPATIENT
Start: 2022-01-17 | End: 2022-01-17

## 2022-01-17 RX ORDER — ONDANSETRON 8 MG/1
4 TABLET, FILM COATED ORAL ONCE
Refills: 0 | Status: COMPLETED | OUTPATIENT
Start: 2022-01-17 | End: 2022-01-17

## 2022-01-17 RX ORDER — ACETAMINOPHEN 500 MG
650 TABLET ORAL ONCE
Refills: 0 | Status: DISCONTINUED | OUTPATIENT
Start: 2022-01-17 | End: 2022-01-31

## 2022-01-17 RX ADMIN — ONDANSETRON 4 MILLIGRAM(S): 8 TABLET, FILM COATED ORAL at 09:36

## 2022-01-17 RX ADMIN — FAMOTIDINE 20 MILLIGRAM(S): 10 INJECTION INTRAVENOUS at 10:40

## 2022-01-17 RX ADMIN — HYDROMORPHONE HYDROCHLORIDE 0.5 MILLIGRAM(S): 2 INJECTION INTRAMUSCULAR; INTRAVENOUS; SUBCUTANEOUS at 10:13

## 2022-01-17 RX ADMIN — HYDROMORPHONE HYDROCHLORIDE 0.5 MILLIGRAM(S): 2 INJECTION INTRAMUSCULAR; INTRAVENOUS; SUBCUTANEOUS at 09:55

## 2022-01-17 RX ADMIN — HYDROMORPHONE HYDROCHLORIDE 0.5 MILLIGRAM(S): 2 INJECTION INTRAMUSCULAR; INTRAVENOUS; SUBCUTANEOUS at 09:36

## 2022-01-17 RX ADMIN — HYDROMORPHONE HYDROCHLORIDE 0.5 MILLIGRAM(S): 2 INJECTION INTRAMUSCULAR; INTRAVENOUS; SUBCUTANEOUS at 10:02

## 2022-01-17 RX ADMIN — HYDROMORPHONE HYDROCHLORIDE 0.5 MILLIGRAM(S): 2 INJECTION INTRAMUSCULAR; INTRAVENOUS; SUBCUTANEOUS at 10:47

## 2022-01-17 RX ADMIN — Medication 5 MILLIGRAM(S): at 09:58

## 2022-01-17 RX ADMIN — SODIUM CHLORIDE 100 MILLILITER(S): 9 INJECTION, SOLUTION INTRAVENOUS at 09:21

## 2022-01-17 NOTE — BRIEF OPERATIVE NOTE - OPERATION/FINDINGS
Laparoscopic cholecystectomy, non-gangrenous, inflamed gallbladder. Cystic duct and artery identified. Critical view obtained. Duct and artery ligated. Hemostasis achieved.

## 2022-01-17 NOTE — PRE-ANESTHESIA EVALUATION ADULT - SPO2 (%)
Spoke to the patient this morning.  Patient is experiencing a lot of anxiety right now.  Patient met with Dr Love this morning to discuss radiation planning, she brought up needing lorazepam refilled which is prescribed by Dr Bobo.  Dr Love did give her a 1 time dose prior to SIM.    Patient is anxious related to her health but also her  also is in the ICU after having a MI early this morning.    She is wondering if her lorazepam can be increased.  She is currently taking 0.5 tablets by mouth every 4 hours.      Dr Bobo - please advise.  Patient may need refill sent to pharmacy.    97

## 2022-01-17 NOTE — ASU DISCHARGE PLAN (ADULT/PEDIATRIC) - NS MD DC FALL RISK RISK
For information on Fall & Injury Prevention, visit: [email or web address] OR  [email or web address] OR  https://www.cdc.gov/steadi/patient.html

## 2022-01-17 NOTE — ASU PATIENT PROFILE, ADULT - FALL HARM RISK - HARM RISK INTERVENTIONS

## 2022-01-17 NOTE — ASU DISCHARGE PLAN (ADULT/PEDIATRIC) - CARE PROVIDER_API CALL
Nilam Flanagan)  Surgery  [address] [place] [address]  Phone: [phone]  Fax: [phone]  Follow Up Time: 2 weeks

## 2022-01-17 NOTE — ASU DISCHARGE PLAN (ADULT/PEDIATRIC) - ASU DC SPECIAL INSTRUCTIONSFT
You are being discharged from [facility]. Please call to schedule a follow up appointment with Dr. Flanagan as previously discussed in 2 weeks. For pain please take Tylenol and ibuprofen as needed every 6-8 hours, please take oxycodone 5mg every 6 hours as needed for severe pain. Please do not drive while under the influence of pain medications. Please keep your dressings in place until you follow up with Dr. Flanagan in his office, if they fall off please keep the white steri strips in place. You may shower, but do not submerge in a water bath. Please avoid heavy weight lifting for the next 4-6 weeks.  If you have any further questions about your care, please do not hesitate to contact Dr. Flanagan 's office or return to the Emergency Department.

## 2022-01-26 LAB — SURGICAL PATHOLOGY STUDY: SIGNIFICANT CHANGE UP

## 2022-01-27 DIAGNOSIS — K80.10 CALCULUS OF GALLBLADDER WITH CHRONIC CHOLECYSTITIS WITHOUT OBSTRUCTION: ICD-10-CM

## 2022-01-27 DIAGNOSIS — F32.9 MAJOR DEPRESSIVE DISORDER, SINGLE EPISODE, UNSPECIFIED: ICD-10-CM

## 2022-01-27 DIAGNOSIS — F41.9 ANXIETY DISORDER, UNSPECIFIED: ICD-10-CM

## 2022-01-27 DIAGNOSIS — F17.210 NICOTINE DEPENDENCE, CIGARETTES, UNCOMPLICATED: ICD-10-CM

## 2022-03-02 NOTE — PROCEDURE NOTE - NSLOADDOSE_OBGYN_ALL_OB
Instructions: This plan will send the code FBSD to the PM system.  DO NOT or CHANGE the price. Price (Do Not Change): 0.00 Detail Level: Simple Other, please specify/Bupivacaine 0.1% fentanyl 2mcg/ml@ 15ml/hr

## 2022-04-28 LAB — MISCELLANEOUS TEST NAME: SIGNIFICANT CHANGE UP

## 2022-06-06 NOTE — DISCHARGE NOTE PROVIDER - NPI NUMBER (FOR SYSAD[employer] USE ONLY) :
Chief complaint:   Chief Complaint   Patient presents with   • Office Visit       Vitals:  Visit Vitals  Wt 20 kg (44 lb 3.2 oz)       HISTORY OF PRESENT ILLNESS     Rash  This is a new problem. Episode onset: 2 weeks ago. The problem is unchanged. Location: UL and LL, face. The problem is moderate. The rash is characterized by itchiness. He was exposed to nothing. Associated symptoms include itching. Pertinent negatives include no congestion, cough, fever or vomiting. Treatments tried: Calamine lotion. The treatment provided mild relief. There is no history of allergies or eczema.   History of similar skin rash few months ago with spontaneous resolution.    Other significant problems:  Patient Active Problem List    Diagnosis Date Noted   • Nightmares 01/04/2021     Priority: Low       PAST MEDICAL, FAMILY AND SOCIAL HISTORY     Medications:  Current Outpatient Medications   Medication   • acetaminophen (Tylenol Childrens) 160 MG/5ML suspension   • ibuprofen (CHILDRENS ADVIL) 100 MG/5ML suspension   • triamcinolone (ARISTOCORT) 0.1 % ointment   • diphenhydrAMINE (BENADRYL) 12.5 MG/5ML liquid   • cetirizine (ZyrTEC Childrens Allergy) 5 MG/5ML solution   • amoxicillin-clavulanate (Augmentin ES-600) 600-42.9 MG/5ML suspension     No current facility-administered medications for this visit.       Allergies:  ALLERGIES:  No Known Allergies    Past Medical  History/Surgeries:  No past medical history on file.    Past Surgical History:   Procedure Laterality Date   • Circumcision, primary         Family History:  No family history on file.    Social History:  Social History     Tobacco Use   • Smoking status: Never Smoker   • Smokeless tobacco: Never Used   Substance Use Topics   • Alcohol use: Not on file       REVIEW OF SYSTEMS     Review of Systems   Constitutional: Negative.  Negative for fever.   HENT: Negative.  Negative for congestion.    Respiratory: Negative.  Negative for cough.    Gastrointestinal: Negative.   Negative for vomiting.   Skin: Positive for itching and rash.       PHYSICAL EXAM     Physical Exam  Constitutional:       General: He is not in acute distress.  HENT:      Right Ear: Tympanic membrane normal.      Left Ear: Tympanic membrane normal.      Mouth/Throat:      Mouth: Mucous membranes are moist.      Pharynx: Oropharynx is clear.   Eyes:      Conjunctiva/sclera: Conjunctivae normal.   Skin:     Comments: Fine papular skin rash on the lateral aspects of the face and the upper arms.  Large flesh Coloured papular skin rash on the extension aspects of the elbows and the knees.   Neurological:      Mental Status: He is alert.         ASSESSMENT/PLAN     DX: Keratosis pilaris of the face and arms, will apply Cera V cream.  Non specific dermatitis of the elbows and the knees most like viral: will start Triamcinolone 0.1% BID.  Follow up as needed.   [[id number]] [[id number]]

## 2022-06-20 ENCOUNTER — NON-APPOINTMENT (OUTPATIENT)
Age: 32
End: 2022-06-20

## 2022-07-06 ENCOUNTER — LABORATORY RESULT (OUTPATIENT)
Age: 32
End: 2022-07-06

## 2022-07-07 ENCOUNTER — LABORATORY RESULT (OUTPATIENT)
Age: 32
End: 2022-07-07

## 2022-07-07 ENCOUNTER — APPOINTMENT (OUTPATIENT)
Dept: OBGYN | Facility: CLINIC | Age: 32
End: 2022-07-07

## 2022-07-07 VITALS — BODY MASS INDEX: 25.77 KG/M2 | HEIGHT: 69 IN | WEIGHT: 174 LBS | TEMPERATURE: 98 F

## 2022-07-07 PROCEDURE — 99395 PREV VISIT EST AGE 18-39: CPT

## 2022-07-11 ENCOUNTER — NON-APPOINTMENT (OUTPATIENT)
Age: 32
End: 2022-07-11

## 2022-07-15 NOTE — OB RN PATIENT PROFILE - INFANT HOME WITH MOTHER, OB PROFILE
Found to have Hg of 5.6. orthostatics + at [facility]. No signs of acute bleed on ROS> likely iron def anemia in the setting of recently starting blood thinners after stroke 4 months ago. Iron studies not received prior to blood transfusion.  -ordered for 2 units prbc  -consider iron infusion  -f/u GI consult recs  -f/u iron studies (drawn after transfusion), B12, folate  -f/u FOBT  -holding AC Found to have Hg of 5.6. orthostatics + at [facility]. No signs of acute bleed on ROS> likely iron def anemia in the setting of recently starting blood thinners after stroke 4 months ago. Iron studies not received prior to blood transfusion. FOBT negative on admission.  -ordered for 2 units prbc  -consider iron infusion  -f/u GI consult recs  -f/u iron studies (drawn after transfusion), B12, folate  -holding AC Found to have Hg of 5.6, normocytic (MCV 91). Orthostatics + at [facility]. No signs of acute bleed on ROS> likely iron def anemia in the setting of recently starting blood thinners after stroke 4 months ago. Iron studies not received prior to blood transfusion. Iron 28 (low), TIBC 249 (wnl), transferrin 216 (wnl), ferritin 6 (low). FOBT negative on admission. Tbili wnl (0.3).   -ordered for 2 units prbc  -consider iron infusion  -f/u GI consult recs  -f/u iron studies (drawn after transfusion), B12, folate  -f/u reticulocyte count  -holding AC Found to have Hg of 5.6, normocytic (MCV 91). Orthostatics + at [facility]. No signs of acute bleed on ROS> likely iron def anemia in the setting of recently starting blood thinners after stroke 4 months ago. Iron studies not received prior to blood transfusion. Iron 28 (low), TIBC 249 (wnl), transferrin 216 (wnl), ferritin 6 (low). FOBT negative on admission. Tbili wnl (0.3).   -ordered for 2 units prbc  -consider iron infusion  -f/u GI consult recs  -f/u iron studies (drawn after transfusion), B12, folate  -f/u reticulocyte count  -holding AC  -Protonix 40 mg IV BID Found to have Hg of 5.6, normocytic (MCV 91). Orthostatics + at [facility]. No signs of acute bleed on ROS> likely iron def anemia in the setting of recently starting blood thinners after stroke 4 months ago. Iron studies not received prior to blood transfusion. Iron 28 (low), TIBC 249 (wnl), transferrin 216 (wnl), ferritin 6 (low). FOBT negative on admission. Tbili wnl (0.3).   -ordered for 2 units prbc  -consider iron infusion  -advance to regular diet, outpatient scope, per GI consult recs  -f/u iron studies (drawn after transfusion), B12, folate  -f/u reticulocyte count  -holding AC  -Protonix 40 mg IV BID no

## 2022-07-22 ENCOUNTER — NON-APPOINTMENT (OUTPATIENT)
Age: 32
End: 2022-07-22

## 2022-07-22 ENCOUNTER — APPOINTMENT (OUTPATIENT)
Dept: OBGYN | Facility: CLINIC | Age: 32
End: 2022-07-22

## 2022-07-22 PROCEDURE — 99213 OFFICE O/P EST LOW 20 MIN: CPT | Mod: TH

## 2022-07-25 NOTE — OB RN PATIENT PROFILE - AMNIOTIC FLUID COLOR, LABOR
Pt premedicated with 0.5 mg dilaudid for surgery to change wound packing.    Electronically signed by Selma Holstein, RN on 7/24/22 at 11:52 PM EDT intact

## 2022-07-28 ENCOUNTER — APPOINTMENT (OUTPATIENT)
Dept: OBGYN | Facility: CLINIC | Age: 32
End: 2022-07-28

## 2022-07-28 VITALS — HEIGHT: 69 IN | BODY MASS INDEX: 25.77 KG/M2 | WEIGHT: 174 LBS | TEMPERATURE: 98.2 F

## 2022-07-28 PROCEDURE — 99213 OFFICE O/P EST LOW 20 MIN: CPT | Mod: TH

## 2022-08-03 ENCOUNTER — LABORATORY RESULT (OUTPATIENT)
Age: 32
End: 2022-08-03

## 2022-08-12 DIAGNOSIS — Z87.42 PERSONAL HISTORY OF OTHER DISEASES OF THE FEMALE GENITAL TRACT: ICD-10-CM

## 2022-08-12 DIAGNOSIS — Z30.9 ENCOUNTER FOR CONTRACEPTIVE MANAGEMENT, UNSPECIFIED: ICD-10-CM

## 2022-08-12 DIAGNOSIS — Z86.19 PERSONAL HISTORY OF OTHER INFECTIOUS AND PARASITIC DISEASES: ICD-10-CM

## 2022-08-12 DIAGNOSIS — Z34.90 ENCOUNTER FOR SUPERVISION OF NORMAL PREGNANCY, UNSPECIFIED, UNSPECIFIED TRIMESTER: ICD-10-CM

## 2022-08-12 DIAGNOSIS — N89.8 OTHER SPECIFIED NONINFLAMMATORY DISORDERS OF VAGINA: ICD-10-CM

## 2022-08-12 DIAGNOSIS — Z30.09 ENCOUNTER FOR OTHER GENERAL COUNSELING AND ADVICE ON CONTRACEPTION: ICD-10-CM

## 2022-08-12 DIAGNOSIS — Z32.01 ENCOUNTER FOR PREGNANCY TEST, RESULT POSITIVE: ICD-10-CM

## 2022-08-12 DIAGNOSIS — N91.1 SECONDARY AMENORRHEA: ICD-10-CM

## 2022-08-12 DIAGNOSIS — Z01.419 ENCOUNTER FOR GYNECOLOGICAL EXAMINATION (GENERAL) (ROUTINE) W/OUT ABNORMAL FINDINGS: ICD-10-CM

## 2022-08-12 RX ORDER — PROGESTERONE 200 MG/1
200 CAPSULE ORAL
Qty: 30 | Refills: 2 | Status: COMPLETED | COMMUNITY
Start: 2021-03-15 | End: 2022-08-12

## 2022-08-12 RX ORDER — FLUCONAZOLE 150 MG/1
150 TABLET ORAL DAILY
Qty: 2 | Refills: 0 | Status: COMPLETED | COMMUNITY
Start: 2020-02-28 | End: 2022-08-12

## 2022-08-12 RX ORDER — PROGESTERONE 200 MG/1
200 CAPSULE ORAL
Qty: 30 | Refills: 2 | Status: COMPLETED | COMMUNITY
Start: 2021-04-08 | End: 2022-08-12

## 2022-08-15 ENCOUNTER — APPOINTMENT (OUTPATIENT)
Dept: OBGYN | Facility: CLINIC | Age: 32
End: 2022-08-15

## 2022-08-15 VITALS — WEIGHT: 179 LBS | BODY MASS INDEX: 26.51 KG/M2 | HEIGHT: 69 IN | TEMPERATURE: 97 F

## 2022-08-15 LAB
BILIRUB UR QL STRIP: NORMAL
CLARITY UR: CLEAR
COLLECTION METHOD: NORMAL
GLUCOSE UR-MCNC: NORMAL
HCG UR QL: 0.2 EU/DL
HGB UR QL STRIP.AUTO: NORMAL
KETONES UR-MCNC: NORMAL
LEUKOCYTE ESTERASE UR QL STRIP: NORMAL
NITRITE UR QL STRIP: NORMAL
PH UR STRIP: 5.5
PROT UR STRIP-MCNC: NORMAL
SP GR UR STRIP: 1.02

## 2022-08-15 PROCEDURE — 99213 OFFICE O/P EST LOW 20 MIN: CPT | Mod: TH

## 2022-08-15 RX ORDER — AZITHROMYCIN 250 MG/1
250 TABLET, FILM COATED ORAL
Qty: 6 | Refills: 0 | Status: COMPLETED | COMMUNITY
Start: 2022-03-21

## 2022-08-15 RX ORDER — SYRINGE WITH NEEDLE, 1 ML 25GX5/8"
22G X 1" SYRINGE, EMPTY DISPOSABLE MISCELLANEOUS
Qty: 30 | Refills: 0 | Status: COMPLETED | COMMUNITY
Start: 2022-04-05

## 2022-08-15 RX ORDER — PROGESTERONE 50 MG/ML
50 INJECTION, SOLUTION INTRAMUSCULAR
Qty: 20 | Refills: 0 | Status: COMPLETED | COMMUNITY
Start: 2022-04-05

## 2022-08-15 RX ORDER — ESTRADIOL 2 MG/1
2 TABLET ORAL
Qty: 270 | Refills: 0 | Status: COMPLETED | COMMUNITY
Start: 2022-05-02

## 2022-08-15 RX ORDER — PREDNISONE 5 MG/1
5 TABLET ORAL
Qty: 30 | Refills: 0 | Status: COMPLETED | COMMUNITY
Start: 2022-04-05

## 2022-08-15 RX ORDER — VALACYCLOVIR 1 G/1
1 TABLET, FILM COATED ORAL
Qty: 20 | Refills: 2 | Status: COMPLETED | COMMUNITY
Start: 2019-08-05 | End: 2022-08-15

## 2022-08-15 RX ORDER — GANIRELIX ACETATE 250 UG/.5ML
250 INJECTION, SOLUTION SUBCUTANEOUS
Qty: 4 | Refills: 0 | Status: COMPLETED | COMMUNITY
Start: 2022-02-14

## 2022-08-15 RX ORDER — NEEDLES, DISPOSABLE 25GX5/8"
18G X 1-1/2" NEEDLE, DISPOSABLE MISCELLANEOUS
Qty: 30 | Refills: 0 | Status: COMPLETED | COMMUNITY
Start: 2022-04-05

## 2022-08-15 RX ORDER — CHORIOGONADOTROPIN ALFA 10000 UNIT
10000 KIT INTRAMUSCULAR
Qty: 1 | Refills: 0 | Status: COMPLETED | COMMUNITY
Start: 2022-02-14

## 2022-08-15 RX ORDER — ETHYNODIOL DIACETATE AND ETHINYL ESTRADIOL 1 MG-35MCG
1-35 KIT ORAL
Qty: 84 | Refills: 0 | Status: COMPLETED | COMMUNITY
Start: 2022-03-07

## 2022-08-15 RX ORDER — SYRINGE WITH NEEDLE, 1 ML 25GX5/8"
22G X 1-1/2" SYRINGE, EMPTY DISPOSABLE MISCELLANEOUS
Qty: 30 | Refills: 0 | Status: COMPLETED | COMMUNITY
Start: 2022-06-01

## 2022-08-30 ENCOUNTER — NON-APPOINTMENT (OUTPATIENT)
Age: 32
End: 2022-08-30

## 2022-09-01 ENCOUNTER — APPOINTMENT (OUTPATIENT)
Dept: OBGYN | Facility: CLINIC | Age: 32
End: 2022-09-01

## 2022-09-01 VITALS — BODY MASS INDEX: 26.81 KG/M2 | HEIGHT: 69 IN | WEIGHT: 181 LBS

## 2022-09-01 LAB
BILIRUB UR QL STRIP: NEGATIVE
BILIRUB UR QL STRIP: NORMAL
CLARITY UR: CLEAR
COLLECTION METHOD: NORMAL
GLUCOSE UR-MCNC: NEGATIVE
GLUCOSE UR-MCNC: NORMAL
HCG UR QL: 0.2 EU/DL
HCG UR QL: 0.2 EU/DL
HGB UR QL STRIP.AUTO: NEGATIVE
HGB UR QL STRIP.AUTO: NORMAL
KETONES UR-MCNC: NORMAL
KETONES UR-MCNC: NORMAL
LEUKOCYTE ESTERASE UR QL STRIP: NORMAL
LEUKOCYTE ESTERASE UR QL STRIP: NORMAL
NITRITE UR QL STRIP: NEGATIVE
NITRITE UR QL STRIP: NORMAL
PH UR STRIP: 5
PH UR STRIP: 6
PROT UR STRIP-MCNC: NEGATIVE
PROT UR STRIP-MCNC: NORMAL
SP GR UR STRIP: 1.02
SP GR UR STRIP: 1.03

## 2022-09-01 PROCEDURE — 99213 OFFICE O/P EST LOW 20 MIN: CPT | Mod: TH

## 2022-09-01 RX ORDER — DOXYLAMINE SUCCINATE AND PYRIDOXINE HYDROCHLORIDE 10; 10 MG/1; MG/1
10-10 TABLET, DELAYED RELEASE ORAL
Qty: 60 | Refills: 3 | Status: ACTIVE | COMMUNITY
Start: 2022-09-01 | End: 1900-01-01

## 2022-09-01 RX ORDER — FAMOTIDINE 40 MG/1
40 TABLET, FILM COATED ORAL DAILY
Qty: 30 | Refills: 2 | Status: ACTIVE | COMMUNITY
Start: 2022-09-01 | End: 1900-01-01

## 2022-09-04 ENCOUNTER — NON-APPOINTMENT (OUTPATIENT)
Age: 32
End: 2022-09-04

## 2022-09-04 RX ORDER — TERCONAZOLE 8 MG/G
0.8 CREAM VAGINAL
Qty: 1 | Refills: 1 | Status: ACTIVE | COMMUNITY
Start: 2022-09-04 | End: 1900-01-01

## 2022-09-05 ENCOUNTER — EMERGENCY (EMERGENCY)
Facility: HOSPITAL | Age: 32
LOS: 0 days | Discharge: HOME | End: 2022-09-05
Attending: EMERGENCY MEDICINE | Admitting: EMERGENCY MEDICINE

## 2022-09-05 ENCOUNTER — NON-APPOINTMENT (OUTPATIENT)
Age: 32
End: 2022-09-05

## 2022-09-05 VITALS
TEMPERATURE: 98 F | HEIGHT: 69 IN | DIASTOLIC BLOOD PRESSURE: 73 MMHG | OXYGEN SATURATION: 98 % | RESPIRATION RATE: 18 BRPM | HEART RATE: 87 BPM | SYSTOLIC BLOOD PRESSURE: 110 MMHG

## 2022-09-05 DIAGNOSIS — F41.9 ANXIETY DISORDER, UNSPECIFIED: ICD-10-CM

## 2022-09-05 DIAGNOSIS — O36.8320 MATERNAL CARE FOR ABNORMALITIES OF THE FETAL HEART RATE OR RHYTHM, SECOND TRIMESTER, NOT APPLICABLE OR UNSPECIFIED: ICD-10-CM

## 2022-09-05 DIAGNOSIS — O99.342 OTHER MENTAL DISORDERS COMPLICATING PREGNANCY, SECOND TRIMESTER: ICD-10-CM

## 2022-09-05 DIAGNOSIS — Z98.890 OTHER SPECIFIED POSTPROCEDURAL STATES: Chronic | ICD-10-CM

## 2022-09-05 DIAGNOSIS — Z3A.20 20 WEEKS GESTATION OF PREGNANCY: ICD-10-CM

## 2022-09-05 PROCEDURE — 99284 EMERGENCY DEPT VISIT MOD MDM: CPT

## 2022-09-05 NOTE — ED PROVIDER NOTE - PHYSICAL EXAMINATION
--EXAM--  VITAL SIGNS: I have reviewed vs documented at present.      ABD: Normal bowel sounds; soft; non-distended; non-tender.

## 2022-09-05 NOTE — ED PROVIDER NOTE - NS ED ROS FT
Review of Systems:  	•	CONSTITUTIONAL - no fever, no diaphoresis, no chills  	  	•	GI - no abd pain, no nausea, no vomiting, no diarrhea, no constipation

## 2022-09-05 NOTE — ED ADULT NURSE NOTE - EXTENSIONS OF SELF_ADULT
Labs all ok, RBC are slightly small (Stable) d/t sickle trait but no anemia.  Cholesterol and electrolytes ok None

## 2022-09-05 NOTE — ED PROVIDER NOTE - CLINICAL SUMMARY MEDICAL DECISION MAKING FREE TEXT BOX
32yoF, 20 weeks pregnant here with decreased fetal movement today. dneies pain, vaginal bleeding or new d/c. no f/c or urinary sxs. on exam, nontoxic, vss   abd benign  sent by OB for fetal heart check.   FHR 160bpm  In my opinion, based on current evaluation and results, an acute medical or surgical emergency does not appear to be occurring at this time and I feel that the pt is stable for further outpatient work up and/or treatment. advised to f/up closely with OB. Return precautions discussed.

## 2022-09-05 NOTE — ED PROVIDER NOTE - OBJECTIVE STATEMENT
this is a 31 yo female presents to ed for evaluation. patient is 20 weeks pregnant and wanted to have fetal heart rate checked. patient denies any pain.

## 2022-09-05 NOTE — ED PROVIDER NOTE - PROGRESS NOTE DETAILS
fetal heart is 160 on doppler.   patient is assured.  patient states that she did not feel movement in morning but did feel baby move on way to hospital. patient decided to just have fetal heart check.      patient ob called ahead and stated patient just needed fetal heart rate

## 2022-09-05 NOTE — ED ADULT TRIAGE NOTE - CHIEF COMPLAINT QUOTE
Pt is 20 weeks pregnant. "I haven't felt the baby move in a while. My doctor sent me here for an ultrasound."

## 2022-09-05 NOTE — ED PROVIDER NOTE - PATIENT PORTAL LINK FT
You can access the FollowMyHealth Patient Portal offered by [facility] by registering at the following website: [email or web address]. By joining [employer]’s FollowMyHealth portal, you will also be able to view your health information using other applications (apps) compatible with our system.

## 2022-09-05 NOTE — ED PROVIDER NOTE - NS ED ATTENDING STATEMENT MOD
This was a shared visit with the SONJA. I reviewed and verified the documentation and independently performed the documented:

## 2022-09-07 ENCOUNTER — APPOINTMENT (OUTPATIENT)
Dept: ANTEPARTUM | Facility: CLINIC | Age: 32
End: 2022-09-07

## 2022-09-07 ENCOUNTER — NON-APPOINTMENT (OUTPATIENT)
Age: 32
End: 2022-09-07

## 2022-09-07 ENCOUNTER — ASOB RESULT (OUTPATIENT)
Age: 32
End: 2022-09-07

## 2022-09-07 VITALS
WEIGHT: 181 LBS | DIASTOLIC BLOOD PRESSURE: 72 MMHG | HEART RATE: 95 BPM | SYSTOLIC BLOOD PRESSURE: 116 MMHG | BODY MASS INDEX: 26.81 KG/M2 | HEIGHT: 69 IN

## 2022-09-07 LAB
BILIRUB UR QL STRIP: NORMAL
CLARITY UR: CLEAR
GLUCOSE UR-MCNC: NORMAL
HCG UR QL: 0.2 EU/DL
HGB UR QL STRIP.AUTO: NORMAL
KETONES UR-MCNC: NORMAL
LEUKOCYTE ESTERASE UR QL STRIP: NORMAL
NITRITE UR QL STRIP: NORMAL
PH UR STRIP: 7.5
PROT UR STRIP-MCNC: NORMAL
SP GR UR STRIP: 1.02

## 2022-09-07 PROCEDURE — 76817 TRANSVAGINAL US OBSTETRIC: CPT | Mod: 59

## 2022-09-07 PROCEDURE — 99215 OFFICE O/P EST HI 40 MIN: CPT | Mod: TH,25

## 2022-09-07 PROCEDURE — 76811 OB US DETAILED SNGL FETUS: CPT

## 2022-09-08 ENCOUNTER — NON-APPOINTMENT (OUTPATIENT)
Age: 32
End: 2022-09-08

## 2022-09-09 ENCOUNTER — ASOB RESULT (OUTPATIENT)
Age: 32
End: 2022-09-09

## 2022-09-09 ENCOUNTER — APPOINTMENT (OUTPATIENT)
Dept: ANTEPARTUM | Facility: CLINIC | Age: 32
End: 2022-09-09

## 2022-09-09 VITALS
HEIGHT: 69 IN | DIASTOLIC BLOOD PRESSURE: 76 MMHG | SYSTOLIC BLOOD PRESSURE: 114 MMHG | BODY MASS INDEX: 26.96 KG/M2 | WEIGHT: 182 LBS | HEART RATE: 99 BPM

## 2022-09-09 PROCEDURE — 76815 OB US LIMITED FETUS(S): CPT

## 2022-09-09 PROCEDURE — 99212 OFFICE O/P EST SF 10 MIN: CPT | Mod: TH,25

## 2022-09-12 ENCOUNTER — APPOINTMENT (OUTPATIENT)
Dept: PEDIATRIC CARDIOLOGY | Facility: CLINIC | Age: 32
End: 2022-09-12

## 2022-09-12 PROCEDURE — 93325 DOPPLER ECHO COLOR FLOW MAPG: CPT

## 2022-09-12 PROCEDURE — 76825 ECHO EXAM OF FETAL HEART: CPT

## 2022-09-12 PROCEDURE — 76827 ECHO EXAM OF FETAL HEART: CPT

## 2022-09-12 PROCEDURE — 99205 OFFICE O/P NEW HI 60 MIN: CPT | Mod: 25

## 2022-09-12 NOTE — DISCUSSION/SUMMARY
[FreeTextEntry1] : Normal fetal echocardiogram\par Follow up PRN; consider  non urgent visit if any suspicion for congenital syndrome\par Otherwise recommend routine prenatal care and delivery\par \par \par I have spent 60 minutes of time on the encounter excluding separately reported services.\par \par \par

## 2022-09-12 NOTE — PHYSICAL EXAM
[General Appearance - In No Acute Distress] : in no acute distress [General Appearance - Well-Appearing] : well appearing

## 2022-09-12 NOTE — REASON FOR VISIT
[Initial Consultation] : an initial consultation for [Patient] : patient [FreeTextEntry3] : Fetal echocardiogram

## 2022-09-12 NOTE — HISTORY OF PRESENT ILLNESS
[FreeTextEntry1] : Ms. LLANOS was referred to pediatric cardiology for a fetal echocardiogram. Patient has been doing well without complaint. 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

## 2022-09-13 ENCOUNTER — NON-APPOINTMENT (OUTPATIENT)
Age: 32
End: 2022-09-13

## 2022-09-19 ENCOUNTER — APPOINTMENT (OUTPATIENT)
Dept: OBGYN | Facility: CLINIC | Age: 32
End: 2022-09-19

## 2022-09-19 ENCOUNTER — NON-APPOINTMENT (OUTPATIENT)
Age: 32
End: 2022-09-19

## 2022-09-19 VITALS — WEIGHT: 186 LBS | BODY MASS INDEX: 27.55 KG/M2 | HEIGHT: 69 IN | TEMPERATURE: 97.9 F

## 2022-09-19 PROCEDURE — 99213 OFFICE O/P EST LOW 20 MIN: CPT | Mod: TH

## 2022-09-27 ENCOUNTER — NON-APPOINTMENT (OUTPATIENT)
Age: 32
End: 2022-09-27

## 2022-09-30 ENCOUNTER — LABORATORY RESULT (OUTPATIENT)
Age: 32
End: 2022-09-30

## 2022-10-03 ENCOUNTER — NON-APPOINTMENT (OUTPATIENT)
Age: 32
End: 2022-10-03

## 2022-10-08 ENCOUNTER — NON-APPOINTMENT (OUTPATIENT)
Age: 32
End: 2022-10-08

## 2022-10-11 ENCOUNTER — APPOINTMENT (OUTPATIENT)
Dept: OBGYN | Facility: CLINIC | Age: 32
End: 2022-10-11

## 2022-10-11 VITALS — HEIGHT: 60 IN | WEIGHT: 190 LBS | TEMPERATURE: 98.1 F | BODY MASS INDEX: 37.3 KG/M2

## 2022-10-11 LAB
BILIRUB UR QL STRIP: NORMAL
GLUCOSE UR-MCNC: NORMAL
HCG UR QL: 0.2 EU/DL
HGB UR QL STRIP.AUTO: NORMAL
KETONES UR-MCNC: NORMAL
LEUKOCYTE ESTERASE UR QL STRIP: NORMAL
NITRITE UR QL STRIP: NORMAL
PH UR STRIP: 6.5
PROT UR STRIP-MCNC: NORMAL
SP GR UR STRIP: 1.01

## 2022-10-11 PROCEDURE — 0502F SUBSEQUENT PRENATAL CARE: CPT

## 2022-10-24 ENCOUNTER — NON-APPOINTMENT (OUTPATIENT)
Age: 32
End: 2022-10-24

## 2022-11-01 ENCOUNTER — NON-APPOINTMENT (OUTPATIENT)
Age: 32
End: 2022-11-01

## 2022-11-01 ENCOUNTER — APPOINTMENT (OUTPATIENT)
Dept: ANTEPARTUM | Facility: CLINIC | Age: 32
End: 2022-11-01

## 2022-11-01 ENCOUNTER — ASOB RESULT (OUTPATIENT)
Age: 32
End: 2022-11-01

## 2022-11-01 VITALS
DIASTOLIC BLOOD PRESSURE: 74 MMHG | HEIGHT: 69 IN | WEIGHT: 197 LBS | SYSTOLIC BLOOD PRESSURE: 112 MMHG | HEART RATE: 99 BPM | BODY MASS INDEX: 29.18 KG/M2

## 2022-11-01 DIAGNOSIS — O35.8XX0 MATERNAL CARE FOR OTHER (SUSPECTED) FETAL ABNORMALITY AND DAMAGE, NOT APPLICABLE OR UNSPECIFIED: ICD-10-CM

## 2022-11-01 PROCEDURE — 76816 OB US FOLLOW-UP PER FETUS: CPT

## 2022-11-01 PROCEDURE — 76817 TRANSVAGINAL US OBSTETRIC: CPT

## 2022-11-07 ENCOUNTER — APPOINTMENT (OUTPATIENT)
Dept: PLASTIC SURGERY | Facility: CLINIC | Age: 32
End: 2022-11-07

## 2022-11-07 PROCEDURE — 99202 OFFICE O/P NEW SF 15 MIN: CPT | Mod: 95

## 2022-11-07 NOTE — HISTORY OF PRESENT ILLNESS
[Home] : at home, [unfilled] , at the time of the visit. [Medical Office: ([facility])___] : at the medical office located in

## 2022-11-09 NOTE — DISCUSSION/SUMMARY
[FreeTextEntry1] : \par This visit was provided via telehealth using real-time 2 way audio and visual technology. The patient was located at home and I was located at my office at [address] [place] [address], [address] [place] [address] at the time of the telehealth visit.Verbal consent was given by the patient; both the patient and I participated in the telehealth encounter.\par Prenatal consultation with parents regarding fetal imaging (3D US) that identified a unilateral (left) cleft lip and possible alveolus. Reported intact palate but this is uncertain.\par \par Parents have 2 sons ages 6 and 4 (?) who are healthy and without facial differences. This current pregnancy is IVF; parents lost their previous 2 pregnancies. Genetics was consulted.\par Next US in 2 weeks \par Due date is beginning of January.\par \par \par We offered counceling regarding  feeding (Dr. Rivas's bottle0, the potential need for nasoalveolar molding with Hiram Knight and offering instructional help by Dawna Evans as a great resource at the time of birth.\par Spend 25 minutes with family discussing the diagnosis and treatment plan. \par Patient and family informed of the timing and risks moving forward.\par All patient questions were answered.\par \par

## 2022-11-10 ENCOUNTER — APPOINTMENT (OUTPATIENT)
Dept: OBGYN | Facility: CLINIC | Age: 32
End: 2022-11-10

## 2022-11-10 VITALS — HEIGHT: 69 IN | BODY MASS INDEX: 29.33 KG/M2 | WEIGHT: 198 LBS | TEMPERATURE: 97.9 F

## 2022-11-10 PROCEDURE — 76815 OB US LIMITED FETUS(S): CPT

## 2022-11-10 PROCEDURE — 0502F SUBSEQUENT PRENATAL CARE: CPT

## 2022-11-10 PROCEDURE — 76820 UMBILICAL ARTERY ECHO: CPT

## 2022-11-10 PROCEDURE — 76819 FETAL BIOPHYS PROFIL W/O NST: CPT | Mod: 59

## 2022-11-15 ENCOUNTER — NON-APPOINTMENT (OUTPATIENT)
Age: 32
End: 2022-11-15

## 2022-11-21 ENCOUNTER — LABORATORY RESULT (OUTPATIENT)
Age: 32
End: 2022-11-21

## 2022-11-21 ENCOUNTER — APPOINTMENT (OUTPATIENT)
Dept: OBGYN | Facility: CLINIC | Age: 32
End: 2022-11-21

## 2022-11-21 VITALS — WEIGHT: 196 LBS | BODY MASS INDEX: 29.03 KG/M2 | TEMPERATURE: 98 F | HEIGHT: 69 IN

## 2022-11-21 PROCEDURE — 0502F SUBSEQUENT PRENATAL CARE: CPT

## 2022-12-03 ENCOUNTER — NON-APPOINTMENT (OUTPATIENT)
Age: 32
End: 2022-12-03

## 2022-12-05 ENCOUNTER — APPOINTMENT (OUTPATIENT)
Dept: ANTEPARTUM | Facility: CLINIC | Age: 32
End: 2022-12-05

## 2022-12-05 ENCOUNTER — NON-APPOINTMENT (OUTPATIENT)
Age: 32
End: 2022-12-05

## 2022-12-05 ENCOUNTER — ASOB RESULT (OUTPATIENT)
Age: 32
End: 2022-12-05

## 2022-12-05 VITALS
HEIGHT: 69 IN | WEIGHT: 202 LBS | BODY MASS INDEX: 29.92 KG/M2 | DIASTOLIC BLOOD PRESSURE: 78 MMHG | HEART RATE: 98 BPM | SYSTOLIC BLOOD PRESSURE: 116 MMHG

## 2022-12-05 PROCEDURE — 76816 OB US FOLLOW-UP PER FETUS: CPT

## 2022-12-06 ENCOUNTER — APPOINTMENT (OUTPATIENT)
Dept: OBGYN | Facility: CLINIC | Age: 32
End: 2022-12-06

## 2022-12-06 ENCOUNTER — NON-APPOINTMENT (OUTPATIENT)
Age: 32
End: 2022-12-06

## 2022-12-06 VITALS — HEIGHT: 69 IN | BODY MASS INDEX: 29.62 KG/M2 | WEIGHT: 200 LBS | TEMPERATURE: 98.1 F

## 2022-12-06 LAB
BILIRUB UR QL STRIP: NORMAL
GLUCOSE UR-MCNC: NORMAL
HCG UR QL: 1 EU/DL
HGB UR QL STRIP.AUTO: NORMAL
KETONES UR-MCNC: NORMAL
LEUKOCYTE ESTERASE UR QL STRIP: NORMAL
NITRITE UR QL STRIP: NORMAL
PH UR STRIP: 6
PROT UR STRIP-MCNC: NORMAL
SP GR UR STRIP: 1.02

## 2022-12-06 PROCEDURE — 81003 URINALYSIS AUTO W/O SCOPE: CPT | Mod: QW

## 2022-12-06 PROCEDURE — 0502F SUBSEQUENT PRENATAL CARE: CPT

## 2022-12-17 ENCOUNTER — NON-APPOINTMENT (OUTPATIENT)
Age: 32
End: 2022-12-17

## 2022-12-20 ENCOUNTER — APPOINTMENT (OUTPATIENT)
Dept: OBGYN | Facility: CLINIC | Age: 32
End: 2022-12-20

## 2022-12-20 VITALS — WEIGHT: 198 LBS | HEIGHT: 69 IN | BODY MASS INDEX: 29.33 KG/M2 | TEMPERATURE: 98.2 F

## 2022-12-20 DIAGNOSIS — B00.9 HERPESVIRAL INFECTION, UNSPECIFIED: ICD-10-CM

## 2022-12-20 DIAGNOSIS — Z86.19 PERSONAL HISTORY OF OTHER INFECTIOUS AND PARASITIC DISEASES: ICD-10-CM

## 2022-12-20 LAB
BILIRUB UR QL STRIP: NORMAL
CLARITY UR: NORMAL
COLLECTION METHOD: NORMAL
GLUCOSE UR-MCNC: 100
HCG UR QL: 4 EU/DL
HGB UR QL STRIP.AUTO: NORMAL
KETONES UR-MCNC: NORMAL
LEUKOCYTE ESTERASE UR QL STRIP: NORMAL
NITRITE UR QL STRIP: NORMAL
PH UR STRIP: 6
PROT UR STRIP-MCNC: 100
SP GR UR STRIP: 1.01

## 2022-12-20 PROCEDURE — 0502F SUBSEQUENT PRENATAL CARE: CPT

## 2022-12-21 ENCOUNTER — NON-APPOINTMENT (OUTPATIENT)
Age: 32
End: 2022-12-21

## 2022-12-21 ENCOUNTER — ASOB RESULT (OUTPATIENT)
Age: 32
End: 2022-12-21

## 2022-12-21 ENCOUNTER — APPOINTMENT (OUTPATIENT)
Dept: ANTEPARTUM | Facility: CLINIC | Age: 32
End: 2022-12-21

## 2022-12-21 VITALS
BODY MASS INDEX: 29.77 KG/M2 | DIASTOLIC BLOOD PRESSURE: 78 MMHG | SYSTOLIC BLOOD PRESSURE: 118 MMHG | HEART RATE: 106 BPM | WEIGHT: 201 LBS | HEIGHT: 69 IN

## 2022-12-21 LAB
BASOPHILS # BLD AUTO: 0.02 K/UL
BASOPHILS NFR BLD AUTO: 0.2 %
EOSINOPHIL # BLD AUTO: 0.05 K/UL
EOSINOPHIL NFR BLD AUTO: 0.6 %
FERRITIN SERPL-MCNC: 62 NG/ML
HCT VFR BLD CALC: 35.3 %
HGB BLD-MCNC: 11.6 G/DL
IMM GRANULOCYTES NFR BLD AUTO: 0.9 %
LYMPHOCYTES # BLD AUTO: 1 K/UL
LYMPHOCYTES NFR BLD AUTO: 11.7 %
MAN DIFF?: NORMAL
MCHC RBC-ENTMCNC: 30.9 PG
MCHC RBC-ENTMCNC: 32.9 G/DL
MCV RBC AUTO: 93.9 FL
MONOCYTES # BLD AUTO: 0.76 K/UL
MONOCYTES NFR BLD AUTO: 8.9 %
NEUTROPHILS # BLD AUTO: 6.64 K/UL
NEUTROPHILS NFR BLD AUTO: 77.7 %
PLATELET # BLD AUTO: 150 K/UL
RBC # BLD: 3.76 M/UL
RBC # FLD: 13.3 %
WBC # FLD AUTO: 8.55 K/UL

## 2022-12-21 PROCEDURE — 76819 FETAL BIOPHYS PROFIL W/O NST: CPT | Mod: 59

## 2022-12-21 PROCEDURE — 76816 OB US FOLLOW-UP PER FETUS: CPT

## 2022-12-23 ENCOUNTER — NON-APPOINTMENT (OUTPATIENT)
Age: 32
End: 2022-12-23

## 2022-12-23 LAB
GP B STREP DNA SPEC QL NAA+PROBE: NOT DETECTED
SOURCE GBS: NORMAL

## 2022-12-26 ENCOUNTER — NON-APPOINTMENT (OUTPATIENT)
Age: 32
End: 2022-12-26

## 2022-12-28 ENCOUNTER — ASOB RESULT (OUTPATIENT)
Age: 32
End: 2022-12-28

## 2022-12-28 ENCOUNTER — NON-APPOINTMENT (OUTPATIENT)
Age: 32
End: 2022-12-28

## 2022-12-28 ENCOUNTER — APPOINTMENT (OUTPATIENT)
Dept: ANTEPARTUM | Facility: CLINIC | Age: 32
End: 2022-12-28

## 2022-12-28 ENCOUNTER — APPOINTMENT (OUTPATIENT)
Dept: OBGYN | Facility: CLINIC | Age: 32
End: 2022-12-28

## 2022-12-28 VITALS — WEIGHT: 200 LBS | TEMPERATURE: 98 F | BODY MASS INDEX: 29.62 KG/M2 | HEIGHT: 69 IN

## 2022-12-28 VITALS
BODY MASS INDEX: 29.62 KG/M2 | WEIGHT: 200 LBS | SYSTOLIC BLOOD PRESSURE: 115 MMHG | DIASTOLIC BLOOD PRESSURE: 75 MMHG | HEART RATE: 88 BPM | HEIGHT: 69 IN

## 2022-12-28 LAB
BILIRUB UR QL STRIP: NORMAL
GLUCOSE UR-MCNC: NORMAL
HCG UR QL: 0.2 EU/DL
HGB UR QL STRIP.AUTO: NORMAL
KETONES UR-MCNC: NORMAL
LEUKOCYTE ESTERASE UR QL STRIP: NORMAL
NITRITE UR QL STRIP: NORMAL
PH UR STRIP: 7
PROT UR STRIP-MCNC: NORMAL
SP GR UR STRIP: 1

## 2022-12-28 PROCEDURE — 76819 FETAL BIOPHYS PROFIL W/O NST: CPT

## 2022-12-28 PROCEDURE — 0502F SUBSEQUENT PRENATAL CARE: CPT

## 2022-12-30 NOTE — OB RN DELIVERY SUMMARY - BABY A: APGAR 1 MIN RESP RATE, DELIVERY
The [facility] pharmacy is the pharmacy in his profile which is where I ended up sending the script.  If that is not ok let me know.  Thanks. (0) absent

## 2023-01-03 ENCOUNTER — NON-APPOINTMENT (OUTPATIENT)
Age: 33
End: 2023-01-03

## 2023-01-04 ENCOUNTER — APPOINTMENT (OUTPATIENT)
Dept: ANTEPARTUM | Facility: CLINIC | Age: 33
End: 2023-01-04
Payer: MEDICAID

## 2023-01-04 ENCOUNTER — ASOB RESULT (OUTPATIENT)
Age: 33
End: 2023-01-04

## 2023-01-04 PROCEDURE — 76819 FETAL BIOPHYS PROFIL W/O NST: CPT

## 2023-01-05 ENCOUNTER — APPOINTMENT (OUTPATIENT)
Dept: OBGYN | Facility: CLINIC | Age: 33
End: 2023-01-05
Payer: COMMERCIAL

## 2023-01-05 VITALS — TEMPERATURE: 97.8 F | HEIGHT: 69 IN | WEIGHT: 199 LBS | BODY MASS INDEX: 29.47 KG/M2

## 2023-01-05 PROCEDURE — 99212 OFFICE O/P EST SF 10 MIN: CPT | Mod: TH

## 2023-01-11 ENCOUNTER — ASOB RESULT (OUTPATIENT)
Age: 33
End: 2023-01-11

## 2023-01-11 ENCOUNTER — APPOINTMENT (OUTPATIENT)
Dept: OBGYN | Facility: CLINIC | Age: 33
End: 2023-01-11
Payer: COMMERCIAL

## 2023-01-11 ENCOUNTER — APPOINTMENT (OUTPATIENT)
Dept: ANTEPARTUM | Facility: CLINIC | Age: 33
End: 2023-01-11
Payer: MEDICAID

## 2023-01-11 VITALS
WEIGHT: 201 LBS | HEART RATE: 70 BPM | HEIGHT: 69 IN | DIASTOLIC BLOOD PRESSURE: 80 MMHG | SYSTOLIC BLOOD PRESSURE: 118 MMHG | BODY MASS INDEX: 29.77 KG/M2

## 2023-01-11 VITALS — HEIGHT: 69 IN | TEMPERATURE: 98 F | WEIGHT: 201 LBS | BODY MASS INDEX: 29.77 KG/M2

## 2023-01-11 LAB
BILIRUB UR QL STRIP: NORMAL
GLUCOSE UR-MCNC: NORMAL
HCG UR QL: 0.2 EU/DL
HGB UR QL STRIP.AUTO: NORMAL
KETONES UR-MCNC: NORMAL
LEUKOCYTE ESTERASE UR QL STRIP: NORMAL
NITRITE UR QL STRIP: NORMAL
PH UR STRIP: 7
PROT UR STRIP-MCNC: NORMAL
SP GR UR STRIP: 1.01

## 2023-01-11 PROCEDURE — 99212 OFFICE O/P EST SF 10 MIN: CPT | Mod: TH

## 2023-01-11 PROCEDURE — 76819 FETAL BIOPHYS PROFIL W/O NST: CPT

## 2023-01-12 ENCOUNTER — NON-APPOINTMENT (OUTPATIENT)
Age: 33
End: 2023-01-12

## 2023-01-13 ENCOUNTER — NON-APPOINTMENT (OUTPATIENT)
Age: 33
End: 2023-01-13

## 2023-01-15 ENCOUNTER — INPATIENT (INPATIENT)
Facility: HOSPITAL | Age: 33
LOS: 0 days | Discharge: HOME | End: 2023-01-16
Attending: OBSTETRICS & GYNECOLOGY | Admitting: OBSTETRICS & GYNECOLOGY
Payer: MEDICAID

## 2023-01-15 VITALS — HEART RATE: 77 BPM | SYSTOLIC BLOOD PRESSURE: 117 MMHG | DIASTOLIC BLOOD PRESSURE: 67 MMHG

## 2023-01-15 DIAGNOSIS — Z98.890 OTHER SPECIFIED POSTPROCEDURAL STATES: Chronic | ICD-10-CM

## 2023-01-15 DIAGNOSIS — Z90.49 ACQUIRED ABSENCE OF OTHER SPECIFIED PARTS OF DIGESTIVE TRACT: Chronic | ICD-10-CM

## 2023-01-15 LAB
APPEARANCE UR: ABNORMAL
BACTERIA # UR AUTO: ABNORMAL
BASOPHILS # BLD AUTO: 0.04 K/UL — SIGNIFICANT CHANGE UP (ref 0–0.2)
BASOPHILS NFR BLD AUTO: 0.3 % — SIGNIFICANT CHANGE UP (ref 0–1)
BILIRUB UR-MCNC: NEGATIVE — SIGNIFICANT CHANGE UP
BLD GP AB SCN SERPL QL: SIGNIFICANT CHANGE UP
COLOR SPEC: YELLOW — SIGNIFICANT CHANGE UP
DIFF PNL FLD: NEGATIVE — SIGNIFICANT CHANGE UP
EOSINOPHIL # BLD AUTO: 0.08 K/UL — SIGNIFICANT CHANGE UP (ref 0–0.7)
EOSINOPHIL NFR BLD AUTO: 0.5 % — SIGNIFICANT CHANGE UP (ref 0–8)
EPI CELLS # UR: 13 /HPF — HIGH (ref 0–5)
GLUCOSE UR QL: NEGATIVE — SIGNIFICANT CHANGE UP
HCT VFR BLD CALC: 36.7 % — LOW (ref 37–47)
HGB BLD-MCNC: 12.2 G/DL — SIGNIFICANT CHANGE UP (ref 12–16)
HYALINE CASTS # UR AUTO: 8 /LPF — HIGH (ref 0–7)
IMM GRANULOCYTES NFR BLD AUTO: 0.9 % — HIGH (ref 0.1–0.3)
KETONES UR-MCNC: NEGATIVE — SIGNIFICANT CHANGE UP
LEUKOCYTE ESTERASE UR-ACNC: ABNORMAL
LYMPHOCYTES # BLD AUTO: 15.7 % — LOW (ref 20.5–51.1)
LYMPHOCYTES # BLD AUTO: 2.42 K/UL — SIGNIFICANT CHANGE UP (ref 1.2–3.4)
MCHC RBC-ENTMCNC: 30.7 PG — SIGNIFICANT CHANGE UP (ref 27–31)
MCHC RBC-ENTMCNC: 33.2 G/DL — SIGNIFICANT CHANGE UP (ref 32–37)
MCV RBC AUTO: 92.4 FL — SIGNIFICANT CHANGE UP (ref 81–99)
MONOCYTES # BLD AUTO: 0.8 K/UL — HIGH (ref 0.1–0.6)
MONOCYTES NFR BLD AUTO: 5.2 % — SIGNIFICANT CHANGE UP (ref 1.7–9.3)
NEUTROPHILS # BLD AUTO: 11.92 K/UL — HIGH (ref 1.4–6.5)
NEUTROPHILS NFR BLD AUTO: 77.4 % — HIGH (ref 42.2–75.2)
NITRITE UR-MCNC: NEGATIVE — SIGNIFICANT CHANGE UP
NRBC # BLD: 0 /100 WBCS — SIGNIFICANT CHANGE UP (ref 0–0)
PH UR: 6 — SIGNIFICANT CHANGE UP (ref 5–8)
PLATELET # BLD AUTO: 140 K/UL — SIGNIFICANT CHANGE UP (ref 130–400)
PRENATAL SYPHILIS TEST: SIGNIFICANT CHANGE UP
PROT UR-MCNC: ABNORMAL
RBC # BLD: 3.97 M/UL — LOW (ref 4.2–5.4)
RBC # FLD: 13.6 % — SIGNIFICANT CHANGE UP (ref 11.5–14.5)
RBC CASTS # UR COMP ASSIST: 3 /HPF — SIGNIFICANT CHANGE UP (ref 0–4)
SP GR SPEC: 1.02 — SIGNIFICANT CHANGE UP (ref 1.01–1.03)
UROBILINOGEN FLD QL: SIGNIFICANT CHANGE UP
WBC # BLD: 15.4 K/UL — HIGH (ref 4.8–10.8)
WBC # FLD AUTO: 15.4 K/UL — HIGH (ref 4.8–10.8)
WBC UR QL: 21 /HPF — HIGH (ref 0–5)

## 2023-01-15 PROCEDURE — 59409 OBSTETRICAL CARE: CPT | Mod: U9

## 2023-01-15 RX ORDER — KETOROLAC TROMETHAMINE 30 MG/ML
30 SYRINGE (ML) INJECTION ONCE
Refills: 0 | Status: DISCONTINUED | OUTPATIENT
Start: 2023-01-15 | End: 2023-01-16

## 2023-01-15 RX ORDER — SIMETHICONE 80 MG/1
80 TABLET, CHEWABLE ORAL EVERY 4 HOURS
Refills: 0 | Status: DISCONTINUED | OUTPATIENT
Start: 2023-01-15 | End: 2023-01-16

## 2023-01-15 RX ORDER — ZINC SULFATE TAB 220 MG (50 MG ZINC EQUIVALENT) 220 (50 ZN) MG
1 TAB ORAL
Qty: 0 | Refills: 0 | DISCHARGE

## 2023-01-15 RX ORDER — ACETAMINOPHEN 500 MG
975 TABLET ORAL
Refills: 0 | Status: DISCONTINUED | OUTPATIENT
Start: 2023-01-15 | End: 2023-01-16

## 2023-01-15 RX ORDER — AER TRAVELER 0.5 G/1
1 SOLUTION RECTAL; TOPICAL EVERY 4 HOURS
Refills: 0 | Status: DISCONTINUED | OUTPATIENT
Start: 2023-01-15 | End: 2023-01-16

## 2023-01-15 RX ORDER — LANOLIN
1 OINTMENT (GRAM) TOPICAL EVERY 6 HOURS
Refills: 0 | Status: DISCONTINUED | OUTPATIENT
Start: 2023-01-15 | End: 2023-01-16

## 2023-01-15 RX ORDER — SODIUM CHLORIDE 9 MG/ML
3 INJECTION INTRAMUSCULAR; INTRAVENOUS; SUBCUTANEOUS EVERY 8 HOURS
Refills: 0 | Status: DISCONTINUED | OUTPATIENT
Start: 2023-01-15 | End: 2023-01-16

## 2023-01-15 RX ORDER — DIBUCAINE 1 %
1 OINTMENT (GRAM) RECTAL EVERY 6 HOURS
Refills: 0 | Status: DISCONTINUED | OUTPATIENT
Start: 2023-01-15 | End: 2023-01-16

## 2023-01-15 RX ORDER — SODIUM CHLORIDE 9 MG/ML
1000 INJECTION, SOLUTION INTRAVENOUS
Refills: 0 | Status: DISCONTINUED | OUTPATIENT
Start: 2023-01-15 | End: 2023-01-15

## 2023-01-15 RX ORDER — OMEPRAZOLE 10 MG/1
1 CAPSULE, DELAYED RELEASE ORAL
Qty: 0 | Refills: 0 | DISCHARGE

## 2023-01-15 RX ORDER — OXYCODONE HYDROCHLORIDE 5 MG/1
5 TABLET ORAL
Refills: 0 | Status: DISCONTINUED | OUTPATIENT
Start: 2023-01-15 | End: 2023-01-16

## 2023-01-15 RX ORDER — SODIUM CHLORIDE 9 MG/ML
1000 INJECTION, SOLUTION INTRAVENOUS
Refills: 0 | Status: DISCONTINUED | OUTPATIENT
Start: 2023-01-15 | End: 2023-01-16

## 2023-01-15 RX ORDER — INFLUENZA VIRUS VACCINE 15; 15; 15; 15 UG/.5ML; UG/.5ML; UG/.5ML; UG/.5ML
0.5 SUSPENSION INTRAMUSCULAR ONCE
Refills: 0 | Status: DISCONTINUED | OUTPATIENT
Start: 2023-01-15 | End: 2023-01-15

## 2023-01-15 RX ORDER — DIPHENHYDRAMINE HCL 50 MG
25 CAPSULE ORAL EVERY 6 HOURS
Refills: 0 | Status: DISCONTINUED | OUTPATIENT
Start: 2023-01-15 | End: 2023-01-16

## 2023-01-15 RX ORDER — ONDANSETRON 8 MG/1
4 TABLET, FILM COATED ORAL EVERY 6 HOURS
Refills: 0 | Status: DISCONTINUED | OUTPATIENT
Start: 2023-01-15 | End: 2023-01-15

## 2023-01-15 RX ORDER — OXYTOCIN 10 UNIT/ML
333.33 VIAL (ML) INJECTION
Qty: 20 | Refills: 0 | Status: DISCONTINUED | OUTPATIENT
Start: 2023-01-15 | End: 2023-01-15

## 2023-01-15 RX ORDER — ESCITALOPRAM OXALATE 10 MG/1
1 TABLET, FILM COATED ORAL
Qty: 0 | Refills: 0 | DISCHARGE

## 2023-01-15 RX ORDER — IBUPROFEN 200 MG
2 TABLET ORAL
Qty: 0 | Refills: 0 | DISCHARGE

## 2023-01-15 RX ORDER — BENZOCAINE 10 %
1 GEL (GRAM) MUCOUS MEMBRANE EVERY 6 HOURS
Refills: 0 | Status: DISCONTINUED | OUTPATIENT
Start: 2023-01-15 | End: 2023-01-16

## 2023-01-15 RX ORDER — PRAMOXINE HYDROCHLORIDE 150 MG/15G
1 AEROSOL, FOAM RECTAL EVERY 4 HOURS
Refills: 0 | Status: DISCONTINUED | OUTPATIENT
Start: 2023-01-15 | End: 2023-01-16

## 2023-01-15 RX ORDER — TETANUS TOXOID, REDUCED DIPHTHERIA TOXOID AND ACELLULAR PERTUSSIS VACCINE, ADSORBED 5; 2.5; 8; 8; 2.5 [IU]/.5ML; [IU]/.5ML; UG/.5ML; UG/.5ML; UG/.5ML
0.5 SUSPENSION INTRAMUSCULAR ONCE
Refills: 0 | Status: DISCONTINUED | OUTPATIENT
Start: 2023-01-15 | End: 2023-01-16

## 2023-01-15 RX ORDER — CITRIC ACID/SODIUM CITRATE 300-500 MG
15 SOLUTION, ORAL ORAL EVERY 6 HOURS
Refills: 0 | Status: DISCONTINUED | OUTPATIENT
Start: 2023-01-15 | End: 2023-01-15

## 2023-01-15 RX ORDER — IBUPROFEN 200 MG
600 TABLET ORAL EVERY 6 HOURS
Refills: 0 | Status: DISCONTINUED | OUTPATIENT
Start: 2023-01-15 | End: 2023-01-16

## 2023-01-15 RX ORDER — SENNA PLUS 8.6 MG/1
2 TABLET ORAL AT BEDTIME
Refills: 0 | Status: DISCONTINUED | OUTPATIENT
Start: 2023-01-15 | End: 2023-01-16

## 2023-01-15 RX ORDER — IBUPROFEN 200 MG
600 TABLET ORAL EVERY 6 HOURS
Refills: 0 | Status: COMPLETED | OUTPATIENT
Start: 2023-01-15 | End: 2023-12-14

## 2023-01-15 RX ORDER — OXYCODONE HYDROCHLORIDE 5 MG/1
5 TABLET ORAL ONCE
Refills: 0 | Status: DISCONTINUED | OUTPATIENT
Start: 2023-01-15 | End: 2023-01-16

## 2023-01-15 RX ORDER — MAGNESIUM HYDROXIDE 400 MG/1
30 TABLET, CHEWABLE ORAL
Refills: 0 | Status: DISCONTINUED | OUTPATIENT
Start: 2023-01-15 | End: 2023-01-16

## 2023-01-15 RX ORDER — OXYTOCIN 10 UNIT/ML
41.67 VIAL (ML) INJECTION
Qty: 20 | Refills: 0 | Status: DISCONTINUED | OUTPATIENT
Start: 2023-01-15 | End: 2023-01-16

## 2023-01-15 RX ORDER — INFLUENZA VIRUS VACCINE 15; 15; 15; 15 UG/.5ML; UG/.5ML; UG/.5ML; UG/.5ML
0.5 SUSPENSION INTRAMUSCULAR ONCE
Refills: 0 | Status: COMPLETED | OUTPATIENT
Start: 2023-01-15 | End: 2023-01-15

## 2023-01-15 RX ORDER — HYDROCORTISONE 1 %
1 OINTMENT (GRAM) TOPICAL EVERY 6 HOURS
Refills: 0 | Status: DISCONTINUED | OUTPATIENT
Start: 2023-01-15 | End: 2023-01-16

## 2023-01-15 RX ORDER — OXYTOCIN 10 UNIT/ML
VIAL (ML) INJECTION
Qty: 30 | Refills: 0 | Status: DISCONTINUED | OUTPATIENT
Start: 2023-01-15 | End: 2023-01-15

## 2023-01-15 RX ORDER — SIMETHICONE 80 MG/1
80 TABLET, CHEWABLE ORAL EVERY 4 HOURS
Refills: 0 | Status: DISCONTINUED | OUTPATIENT
Start: 2023-01-15 | End: 2023-01-15

## 2023-01-15 RX ADMIN — ONDANSETRON 4 MILLIGRAM(S): 8 TABLET, FILM COATED ORAL at 14:30

## 2023-01-15 RX ADMIN — Medication 2 MILLIUNIT(S)/MIN: at 09:52

## 2023-01-15 RX ADMIN — SIMETHICONE 80 MILLIGRAM(S): 80 TABLET, CHEWABLE ORAL at 18:34

## 2023-01-15 RX ADMIN — SODIUM CHLORIDE 125 MILLILITER(S): 9 INJECTION, SOLUTION INTRAVENOUS at 11:03

## 2023-01-15 RX ADMIN — SIMETHICONE 80 MILLIGRAM(S): 80 TABLET, CHEWABLE ORAL at 23:09

## 2023-01-15 RX ADMIN — Medication 975 MILLIGRAM(S): at 21:50

## 2023-01-15 RX ADMIN — Medication 600 MILLIGRAM(S): at 23:09

## 2023-01-15 RX ADMIN — SODIUM CHLORIDE 3 MILLILITER(S): 9 INJECTION INTRAMUSCULAR; INTRAVENOUS; SUBCUTANEOUS at 23:05

## 2023-01-15 RX ADMIN — SENNA PLUS 2 TABLET(S): 8.6 TABLET ORAL at 23:09

## 2023-01-15 RX ADMIN — SODIUM CHLORIDE 125 MILLILITER(S): 9 INJECTION, SOLUTION INTRAVENOUS at 18:34

## 2023-01-15 RX ADMIN — Medication 975 MILLIGRAM(S): at 21:18

## 2023-01-15 NOTE — OB PROVIDER H&P - NSICDXPASTSURGICALHX_GEN_ALL_CORE_FT
PAST SURGICAL HISTORY:  H/O colonoscopy 2019    History of esophagogastroduodenoscopy (EGD)     S/P cholecystectomy

## 2023-01-15 NOTE — OB PROVIDER DELIVERY SUMMARY - NSSELHIDDEN_OBGYN_ALL_OB_FT
[NS_DeliveryAttending1_OBGYN_ALL_OB_FT:[id number]],[NS_DeliveryRN_OBGYN_ALL_OB_FT:[id number]],[NS_CirculateRN2_OBGYN_ALL_OB_FT:[id number]]

## 2023-01-15 NOTE — PROCEDURE NOTE - ADDITIONAL PROCEDURE DETAILS
epid infusion of Bupiv 0.625%  with Fentanyl 2 mcg/cc started at 18cc/hr at 1140 epid infusion of Bupiv 0.625%  with Fentanyl 2 mcg/cc started at 18cc/hr at 1140    At 1635   Epid infusion reduced to 8cc/hr at the request of Dr Lundberg

## 2023-01-15 NOTE — PATIENT PROFILE, NEWBORN NICU. - SOURCE OF INFORMATION, NEWBORN NICU  PROFILE
I have reviewed the notes, assessments, and/or procedures performed this visit, and I concur with the documentation.   chart(s)

## 2023-01-15 NOTE — OB RN DELIVERY SUMMARY - NS_BIRTHTRAUMADETAILSA_OBGYN_ALL_OB_FT
Pediatric team  and  at delivery,  assessed by same. Pediatric team Scooter and Megan at delivery,  assessed by same.

## 2023-01-15 NOTE — OB RN PATIENT PROFILE - NS PRO DEPRESSION SCREENING Y/N6
This gentleman had a history of a Lap-Band that was removed due to technical issues with the Lap-Band and the fact that it was too tight.  He needs to lose 20 pounds prior to surgery.  My plan is to convert him to a sleeve gastrectomy.  I would like to do the procedure with Dr. Moreno.  If we are unable to see the planes clearly we will convert him to a Homero-en-Y gastric bypass.  He understands the logic behind this and the potential risk and complications of both procedures and the certainly increased risk of leakage given his previous Lap-Band.
yes

## 2023-01-15 NOTE — OB PROVIDER H&P - ASSESSMENT
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 Murphy and Dr. Lundberg aware   A/P: 33 yo  at 39w1d GA, GBS neg, IVF pregnancy (secondary to h/o skeletal anomalies with NCOA7X8 gene); complicated by unilateral left cleft lip, suspected fetal macrosomia (EFW 92% with AC>99%), elevated GCT with normal GTT; IOL at term   -Admit to L+D  -Monitor EFM and TOCO   -IVF and labs  -Pain control PRN  -diet: clear liquid diet  -UDS  -induction: pitocin. Discussed with patient in light of suspected fetal macrosomia, will utilize pitocin until the patient is in the active phase of labor. Once in the active phase of labor (>6cm), will discontinue the pitocin and allow labor to progress on its own and watch for signs of labor dystocia.   -case discussed with peds    Dr. Murphy and Dr. Lundberg aware   A/P: 33 yo  at 39w1d GA, GBS neg, IVF pregnancy (secondary to h/o skeletal anomalies with VPMW5V4 gene); complicated by unilateral left cleft lip, suspected fetal macrosomia (EFW 92% with AC>99%), elevated GCT with normal GTT; IOL at term   -Admit to L+D  -Monitor EFM and TOCO   -IVF and labs  -Pain control PRN  -diet: clear liquid diet  -UDS  -induction: pitocin. Discussed with patient in light of suspected fetal macrosomia, will utilize pitocin until the patient is in the active phase of labor. Once in the active phase of labor (>6cm), will discontinue the pitocin and allow labor to progress on its own and watch for signs of labor dystocia.   -case discussed with peds  -needs MMR pp for dixon Murphy and Dr. Lundberg aware

## 2023-01-15 NOTE — OB PROVIDER H&P - HISTORY OF PRESENT ILLNESS
33 yo  @39w1, BHAVANI  by day 5 frozen embryo transfer, presents for IOL at term.     Pregnancy complicated by:   -IVF pregnancy (day five frozen embryo transfer) (normal fetal ECHO, normal PGT testing)   -fetal unilateral (left) cleft lip & possible alveolus. S/p genetic counseling and evaluation by plastics surgery   -suspected fetal macrosomia (EFW>99% at 35w4d)   -elevated GCT but normal GTT   -h/o prior pregnancy with multiple skeletal anomalies (2021). 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 Reports good fetal movement, denies leaking fluid, vaginal bleeding, or contractions.    Pregnancy complicated by:   -IVF pregnancy (day five frozen embryo transfer) (normal fetal ECHO, normal PGT testing, this fetus is a carrier of the LSNI8X8 gene)   -fetal unilateral (left) cleft lip & possible alveolus. S/p genetic counseling and evaluation by plastics surgery   -suspected fetal macrosomia (EFW>99% at 35w4d)   -elevated GCT but normal GTT   -h/o prior pregnancy with multiple skeletal anomalies (2021). 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 Reports good fetal movement, denies leaking fluid, vaginal bleeding, or contractions. GBS neg    Pregnancy complicated by:   -IVF pregnancy (day five frozen embryo transfer) (normal fetal ECHO, normal PGT testing, this fetus is a carrier of the MQHS1F2 gene)   -fetal unilateral (left) cleft lip & possible alveolus. S/p genetic counseling and evaluation by plastics surgery   -suspected fetal macrosomia (EFW>99% at 35w4d)   -elevated GCT but normal GTT   -h/o prior pregnancy with multiple skeletal anomalies (2021). Testing positive for SFLN3F4 gene radha variant

## 2023-01-15 NOTE — OB PROVIDER H&P - NSRISKFACTORSDETA_OBGYN_ALL_OB
Other Poor Pregnancy Outcome/Referral for High Risk Care/Pregnancy resulting from Infertility treatment

## 2023-01-15 NOTE — OB PROVIDER H&P - NSHPPHYSICALEXAM_GEN_ALL_CORE
Vital Signs Last 24 Hrs  T(C): 36.8 (15 Vikash 2023 08:23), Max: 36.8 (15 Vikash 2023 08:09)  T(F): 98.2 (15 Vikash 2023 08:23), Max: 98.24 (15 Vikash 2023 08:09)  HR: 81 (15 Vikash 2023 09:11) (75 - 89)  BP: 117/67 (15 Vikash 2023 08:23) (117/67 - 117/67)  RR: 18 (15 Vikash 2023 08:23) (18 - 18)  SpO2: 98% (15 Vikash 2023 09:11) (97% - 99%)    Parameters below as of 15 Vikash 2023 08:23  Patient On (Oxygen Delivery Method): room air Vital Signs Last 24 Hrs  T(C): 36.8 (15 Vikash 2023 08:23), Max: 36.8 (15 Vikash 2023 08:09)  T(F): 98.2 (15 Vikash 2023 08:23), Max: 98.24 (15 Vikash 2023 08:09)  HR: 81 (15 Vikash 2023 09:11) (75 - 89)  BP: 117/67 (15 Vikash 2023 08:23) (117/67 - 117/67)  RR: 18 (15 Vikash 2023 08:23) (18 - 18)  SpO2: 98% (15 Vikash 2023 09:11) (97% - 99%)    Parameters below as of 15 Vikash 2023 08:23  Patient On (Oxygen Delivery Method): room air    Gen: NAD, sitting comfortably  Abd: Gravid, soft, NT, no palpable ctx  SVE: 2/50/-3, vtx, intact  EFM: 150/mod/+accels  Arthur: none  Sono: cephalic, EFW 4134g (92.9%), AC >99% Vital Signs Last 24 Hrs  T(C): 36.8 (15 Vikash 2023 08:23), Max: 36.8 (15 Vikash 2023 08:09)  T(F): 98.2 (15 Vikash 2023 08:23), Max: 98.24 (15 Vikash 2023 08:09)  HR: 81 (15 Vikash 2023 09:11) (75 - 89)  BP: 117/67 (15 Vikash 2023 08:23) (117/67 - 117/67)  RR: 18 (15 Vikash 2023 08:23) (18 - 18)  SpO2: 98% (15 Vikash 2023 09:11) (97% - 99%)    Physical Exam:   Gen: NAD, sitting comfortably  Abd: Gravid, soft, NT, no palpable ctx  SVE: 2/50/-3, vtx, intact  EFM: 150/mod/+accels  [place]: none  Sono: cephalic, EFW 4134g (92.9%), AC >99%

## 2023-01-15 NOTE — OB PROVIDER H&P - NS_OBGYNHISTORY_OBGYN_ALL_OB_FT
OB Hx:   FT  x2, largest 7-8  TOP via IOL x2 @20w and @22w for skeletal dysplasia  SAB x1, no D&C    GYN Hx; Denies h/o fibroids, cysts, abnormal pap, STI OB Hx:   FT  x2, largest 7-8  eTOP @20w and @22w for skeletal dysplasia  SAB x1, no D&C    GYN Hx; Denies h/o fibroids, cysts, abnormal pap, STI

## 2023-01-15 NOTE — OB RN DELIVERY SUMMARY - NSSELHIDDEN_OBGYN_ALL_OB_FT
[NS_DeliveryAttending1_OBGYN_ALL_OB_FT:[id number]],[NS_DeliveryRN_OBGYN_ALL_OB_FT:[id number]],[NS_CirculateRN2_OBGYN_ALL_OB_FT:[id number]]

## 2023-01-15 NOTE — OB PROVIDER H&P - NSHPLABSRESULTS_GEN_ALL_CORE
Prenatal Labs:   Rubeola: equivocal   Varicella: immune  Rubella: immune  RPR: neg  Hep C: neg  HbSAg: neg  HIV: neg (11/21/2022)   Blood Type: O pos   Antibody Screen: neg  AFP: low risk   GCT: 163  GTT: 79/142/104/40  GBS: neg (12/20/2022)     Ultrasounds:   @20w4d: EFW 394g, vtx, posterior placenta, MVP 5.5cm, cleft lip noted, cvx 5.4cm   @28w3d: EFW 1661g (>99%), vtx, posterior placenta, MVP 5.93cm. Posterior placenta is no longer low-lying   @33w2d: EFW 2879g (99%), vtx, posterior placenta, MVP 6.69cm  @35w4d: EFW 3532g (>99%) [AC >99%, HC >62%], vtx, posterior placenta, MVP 4.45cm

## 2023-01-15 NOTE — OB RN PATIENT PROFILE - NS_OBGYNHISTORY_OBGYN_ALL_OB_FT
OB Hx:   FT  x2, largest 7-8  eTOP @20w and @22w for skeletal dysplasia  SAB x1, no D&C    GYN Hx; Denies h/o fibroids, cysts, abnormal pap, STI

## 2023-01-15 NOTE — OB PROVIDER DELIVERY SUMMARY - NSPROVIDERDELIVERYNOTE_OBGYN_ALL_OB_FT
IN BIRTHING ROOM. PT WITH INTENSE RECTAL PRESSURE AND WANTING TO PUSH. PEDIATRIC PERSONEL CALLED DUE TO ANTENATALLY DIAGNOSED CLEFT LIP. ATRAUMATIC  VIABLE BABY GIRL OVER MEDIAN EPISIOTOMY. SPONTANEOUSLY CRIED.  BABY SUCTIONED UPON DELIVERY AND CORD CLAMPED X 2 AND CUT AND BABY HANDED TO NURSE AND TO MOTHER. CORD BLOOD TAKEN FOR GASES BUT DELAYED CORD CLAMPING NOT DONE DUET TO PATIENT DESIRE FOR STEM CELL COLLECTION.  PITOCIN 40 UNITS IN 1000 CC RL RUN RAPIDLY AND PLACENTA DELIVERED SPONTANEOUSLY AND APPEARED INTACT. INSPECTION OF CERVIX, VAGINA AND PERINEUM DONE AND PREVIOUSLY KNOWN RT VAGINAL WALL CYST AGAIN NOTED. SMALL PARTIAL 3RD DEGREE EXTENSION OF EPISIOTOMY NOTED AND REPAIRED IN THE USUAL MANNER WITH 2-0 CHROMIC SUTURE. VAGINAL WALL EEPAIRED FIRST FOLLOWED BY CROWN STITCH AND INTERUPTED SUTURES TO CLOSE DEEPER TISSUES.  REMAINED OF EPISITIOMY AND EXTENSION CLOSED IN TWO LAYERS OF 2 -0 CHROMIC SUTURE WITH THE LAST LAYER SUBCUTICULAR.  GOOD HEMOSTASIS NOTED.  SPHINCTER INTACT.  UTERINE FUNDUS FIRM AND WELL BELOW UMBILICUS. APGARS 9-9; WEIGHT 8 LBS 7 OZ.  MIDLINE UPPER CLEFT LIP NOTED ( EXPECTED FROM PRENATAL SONOGRAMS) AND INTACT PALATE NOTED.

## 2023-01-15 NOTE — OB PROVIDER H&P - NSLOWPPHRISK_OBGYN_A_OB
No previous uterine incision/Ramos Pregnancy/Less than or equal to 4 previous vaginal births/No known bleeding disorder/No history of postpartum hemorrhage/No other PPH risks indicated

## 2023-01-16 ENCOUNTER — TRANSCRIPTION ENCOUNTER (OUTPATIENT)
Age: 33
End: 2023-01-16

## 2023-01-16 VITALS
TEMPERATURE: 98 F | DIASTOLIC BLOOD PRESSURE: 62 MMHG | HEART RATE: 66 BPM | SYSTOLIC BLOOD PRESSURE: 110 MMHG | RESPIRATION RATE: 16 BRPM

## 2023-01-16 LAB
AMPHET UR-MCNC: NEGATIVE — SIGNIFICANT CHANGE UP
BARBITURATES UR SCN-MCNC: NEGATIVE — SIGNIFICANT CHANGE UP
BASOPHILS # BLD AUTO: 0.02 K/UL — SIGNIFICANT CHANGE UP (ref 0–0.2)
BASOPHILS NFR BLD AUTO: 0.1 % — SIGNIFICANT CHANGE UP (ref 0–1)
BENZODIAZ UR-MCNC: NEGATIVE — SIGNIFICANT CHANGE UP
BUPRENORPHINE SCREEN, URINE RESULT: NEGATIVE — SIGNIFICANT CHANGE UP
COCAINE METAB.OTHER UR-MCNC: NEGATIVE — SIGNIFICANT CHANGE UP
EOSINOPHIL # BLD AUTO: 0.14 K/UL — SIGNIFICANT CHANGE UP (ref 0–0.7)
EOSINOPHIL NFR BLD AUTO: 0.9 % — SIGNIFICANT CHANGE UP (ref 0–8)
FENTANYL UR QL: NEGATIVE — SIGNIFICANT CHANGE UP
HCT VFR BLD CALC: 34.6 % — LOW (ref 37–47)
HGB BLD-MCNC: 11.2 G/DL — LOW (ref 12–16)
IMM GRANULOCYTES NFR BLD AUTO: 0.9 % — HIGH (ref 0.1–0.3)
L&D DRUG SCREEN, URINE: SIGNIFICANT CHANGE UP
LYMPHOCYTES # BLD AUTO: 17.2 % — LOW (ref 20.5–51.1)
LYMPHOCYTES # BLD AUTO: 2.73 K/UL — SIGNIFICANT CHANGE UP (ref 1.2–3.4)
MCHC RBC-ENTMCNC: 30.7 PG — SIGNIFICANT CHANGE UP (ref 27–31)
MCHC RBC-ENTMCNC: 32.4 G/DL — SIGNIFICANT CHANGE UP (ref 32–37)
MCV RBC AUTO: 94.8 FL — SIGNIFICANT CHANGE UP (ref 81–99)
METHADONE UR-MCNC: NEGATIVE — SIGNIFICANT CHANGE UP
MONOCYTES # BLD AUTO: 1.18 K/UL — HIGH (ref 0.1–0.6)
MONOCYTES NFR BLD AUTO: 7.4 % — SIGNIFICANT CHANGE UP (ref 1.7–9.3)
NEUTROPHILS # BLD AUTO: 11.69 K/UL — HIGH (ref 1.4–6.5)
NEUTROPHILS NFR BLD AUTO: 73.5 % — SIGNIFICANT CHANGE UP (ref 42.2–75.2)
NRBC # BLD: 0 /100 WBCS — SIGNIFICANT CHANGE UP (ref 0–0)
OPIATES UR-MCNC: NEGATIVE — SIGNIFICANT CHANGE UP
OXYCODONE UR-MCNC: NEGATIVE — SIGNIFICANT CHANGE UP
PCP UR-MCNC: NEGATIVE — SIGNIFICANT CHANGE UP
PLATELET # BLD AUTO: 126 K/UL — LOW (ref 130–400)
PROPOXYPHENE QUALITATIVE URINE RESULT: NEGATIVE — SIGNIFICANT CHANGE UP
RBC # BLD: 3.65 M/UL — LOW (ref 4.2–5.4)
RBC # FLD: 13.5 % — SIGNIFICANT CHANGE UP (ref 11.5–14.5)
WBC # BLD: 15.91 K/UL — HIGH (ref 4.8–10.8)
WBC # FLD AUTO: 15.91 K/UL — HIGH (ref 4.8–10.8)

## 2023-01-16 RX ORDER — IBUPROFEN 200 MG
1 TABLET ORAL
Qty: 0 | Refills: 0 | DISCHARGE
Start: 2023-01-16

## 2023-01-16 RX ORDER — ACETAMINOPHEN 500 MG
3 TABLET ORAL
Qty: 0 | Refills: 0 | DISCHARGE
Start: 2023-01-16

## 2023-01-16 RX ADMIN — Medication 975 MILLIGRAM(S): at 14:45

## 2023-01-16 RX ADMIN — SIMETHICONE 80 MILLIGRAM(S): 80 TABLET, CHEWABLE ORAL at 03:33

## 2023-01-16 RX ADMIN — Medication 600 MILLIGRAM(S): at 11:45

## 2023-01-16 RX ADMIN — Medication 600 MILLIGRAM(S): at 12:10

## 2023-01-16 RX ADMIN — SIMETHICONE 80 MILLIGRAM(S): 80 TABLET, CHEWABLE ORAL at 06:26

## 2023-01-16 RX ADMIN — Medication 975 MILLIGRAM(S): at 03:33

## 2023-01-16 RX ADMIN — Medication 600 MILLIGRAM(S): at 17:46

## 2023-01-16 RX ADMIN — Medication 975 MILLIGRAM(S): at 14:14

## 2023-01-16 RX ADMIN — SIMETHICONE 80 MILLIGRAM(S): 80 TABLET, CHEWABLE ORAL at 09:31

## 2023-01-16 RX ADMIN — Medication 600 MILLIGRAM(S): at 06:26

## 2023-01-16 RX ADMIN — Medication 975 MILLIGRAM(S): at 10:05

## 2023-01-16 RX ADMIN — Medication 600 MILLIGRAM(S): at 18:20

## 2023-01-16 RX ADMIN — Medication 1 TABLET(S): at 11:45

## 2023-01-16 RX ADMIN — Medication 975 MILLIGRAM(S): at 04:06

## 2023-01-16 RX ADMIN — SIMETHICONE 80 MILLIGRAM(S): 80 TABLET, CHEWABLE ORAL at 14:13

## 2023-01-16 RX ADMIN — SIMETHICONE 80 MILLIGRAM(S): 80 TABLET, CHEWABLE ORAL at 17:46

## 2023-01-16 RX ADMIN — Medication 975 MILLIGRAM(S): at 09:31

## 2023-01-16 NOTE — DISCHARGE NOTE OB - CARE PLAN
Principal Discharge DX:	Vaginal delivery  Assessment and plan of treatment:	Nothing in the vagina for 6 weeks (no sex, no tampons, no douching). Avoid tub baths, you may shower.    If you have a fever of 100.4F or greater, severe vaginal bleeding, or severe abdominal pain, call your Ob/Gyn or come to the emergency department immediately.   1

## 2023-01-16 NOTE — PROGRESS NOTE ADULT - ASSESSMENT
A/P: 33 yo  at 39w1d GA, GBS neg, IVF pregnancy (secondary to h/o skeletal anomalies with GXGO7T4 gene); complicated by unilateral left cleft lip, suspected fetal macrosomia (EFW 92% with AC>99%), elevated GCT with normal GTT; IOL at term, on pitocin, s/p AROM, fully dilated  - current management  - continuous toco/EFM  - pain management PRN  - pitocin turned down to 12u  - pt very numb and unable to push, anesthesia notified and epidural infusion decreased  - will begin pushing imminently when pt able      Dr. Murphy aware and Dr. Lundberg at bedside
A/P: 31 yo  at 39w1d GA, GBS neg, IVF pregnancy (secondary to h/o skeletal anomalies with DHOC4F3 gene); complicated by unilateral left cleft lip, suspected fetal macrosomia (EFW 92% with AC>99%), elevated GCT with normal GTT; IOL at term, on pitocin, s/p AROM  - current management  - continuous toco/EFM  - pain management PRN  - c/w pitocin for IOL    Dr. Murphy aware and Dr. Lundberg at bedside
A/P: 31 yo  at 39w1d GA, GBS neg, IVF pregnancy (secondary to h/o skeletal anomalies with MKYA0X7 gene); complicated by unilateral left cleft lip, suspected fetal macrosomia (EFW 92% with AC>99%), elevated GCT with normal GTT; IOL at term   - current management  - continuous toco/EFM  - pain management PRN  - c/w pitocin for IOL    Dr. Murphy and Dr. Lundberg aware
31 yo now P3, IVF, s/p uncomplicated , PPD1, baby with known cleft lip, recovering well.    - pain management PRN  - regular diet, PO hydration  - monitor vitals and bleeding  - encourage ambulation  - routine postpartum care  - discharge today, follow up 6wks

## 2023-01-16 NOTE — DISCHARGE NOTE OB - CARE PROVIDER_API CALL
Gregory Lundberg)  Obstetrics and Gynecology  [address] [place] [address]  Phone: [phone]  Fax: [phone]  Follow Up Time:

## 2023-01-16 NOTE — DISCHARGE NOTE OB - PATIENT PORTAL LINK FT
You can access the FollowMyHealth Patient Portal offered by [facility] by registering at the following website: [email or web address]. By joining [employer]’s FollowMyHealth portal, you will also be able to view your health information using other applications (apps) compatible with our system.

## 2023-01-16 NOTE — PROGRESS NOTE ADULT - SUBJECTIVE AND OBJECTIVE BOX
OB Attending Post Partum Note    Subjective: Patient seen and examined at bedside. Doing well, no complaints. Denies fever, chills, CP, SOB, N/V, LE pain. She is ambulating, voiding, passing flatus, tolerating regular diet, bottle/breast feeding.     Physical exam:    Vital Signs Last 24 Hrs  T(F): 96.7 (16 Jan 2023 07:50), Max: 98.7 (15 Vikash 2023 19:00)  HR: 67 (16 Jan 2023 07:50) (64 - 173)  BP: 112/65 (16 Jan 2023 07:50) (91/47 - 129/60)  RR: 18 (16 Jan 2023 07:50) (18 - 19)  SpO2: 96% (15 Vikash 2023 19:11) (79% - 100%)        Gen: NAD  CVS: s1s2, rrr  Lungs: ctab, no rhonchi or rales  Abdomen: Soft, nontender, no distension , firm uterine fundus at umbilicus.  Pelvic: Normal lochia noted  Ext: No calf tenderness    Diet: regular  meds:       acetaminophen     Tablet ..   975 milliGRAM(s) Oral (01-16-23 @ 09:31)   975 milliGRAM(s) Oral (01-16-23 @ 03:33)   975 milliGRAM(s) Oral (01-15-23 @ 21:18)    ibuprofen  Tablet.   600 milliGRAM(s) Oral (01-16-23 @ 11:45)   600 milliGRAM(s) Oral (01-16-23 @ 06:26)   600 milliGRAM(s) Oral (01-15-23 @ 23:09)    lactated ringers   125 mL/Hr IV Continuous (01-15-23 @ 18:23)    ondansetron Injectable   4 milliGRAM(s) IV Push (01-15-23 @ 14:30)    prenatal multivitamin   1 Tablet(s) Oral (01-16-23 @ 11:45)    senna   2 Tablet(s) Oral (01-15-23 @ 23:09)    simethicone   80 milliGRAM(s) Chew (01-16-23 @ 09:31)   80 milliGRAM(s) Chew (01-16-23 @ 06:26)   80 milliGRAM(s) Chew (01-16-23 @ 03:33)   80 milliGRAM(s) Chew (01-15-23 @ 23:09)   80 milliGRAM(s) Chew (01-15-23 @ 18:34)          LABS:                        11.2   15.91 )-----------( 126      ( 16 Jan 2023 06:26 )             34.6                         12.2   15.40 )-----------( 140      ( 15 Vikash 2023 08:28 )             36.7                   
PGY 1 Note    Patient seen at bedside for evaluation of labor progression. Epidural in place. Reporting some nausea.     T(F): 98.24 (13:57)  HR: 89 (14:18)  BP: 96/59 (14:12)  RR: 19 (13:57)    EFM: 140/mod/+accels  TOCO: q3m  SVE: 3.5/50/-1 AROM clear @1411 per Dr. Lundberg    Labs:                        12.2   15.40 )-----------( 140      ( 15 Vikash 2023 08:28 )             36.7           ABO RH Interpretation: O POS (01-15-23 @ 08:28)    Urinalysis Basic - ( 15 Vikash 2023 08:28 )    Color: Yellow / Appearance: Slightly Turbid / S.025 / pH: x  Gluc: x / Ketone: Negative  / Bili: Negative / Urobili: <2 mg/dL   Blood: x / Protein: 30 mg/dL / Nitrite: Negative   Leuk Esterase: Large / RBC: 3 /HPF / WBC 21 /HPF   Sq Epi: x / Non Sq Epi: 13 /HPF / Bacteria: Moderate          Meds: citric acid/sodium citrate Solution 15 milliLiter(s) Oral every 6 hours  influenza   Vaccine 0.5 milliLiter(s) IntraMuscular once  lactated ringers 1000 milliLiter(s) IV Continuous <Continuous>  lactated ringers. 1000 milliLiter(s) IV Continuous <Continuous>  oxytocin Infusion 333.333 milliUNIT(s)/Min IV Continuous <Continuous>  oxytocin Infusion.  milliUNIT(s)/Min IV Continuous <Continuous>    
  PGY2 Progress Note    Patient seen and examined at bedside, no current complaints.        Vital Signs Last 24 Hrs  T(C): 36.8 (15 Vikash 2023 11:04), Max: 36.8 (15 Vikash 2023 08:09)  T(F): 98.24 (15 Vikash 2023 11:04), Max: 98.24 (15 Vikash 2023 08:09)  HR: 73 (15 Vikash 2023 13:01) (67 - 89)  BP: 112/68 (15 Vikash 2023 12:57) (97/59 - 118/55)  RR: 18 (15 Vikash 2023 11:04) (18 - 18)  SpO2: 99% (15 Vikash 2023 13:06) (96% - 100%)    Parameters below as of 15 Vikash 2023 08:23  Patient On (Oxygen Delivery Method): room air      EFM: 135BPM/mod jailene/accels pos  TOCO: q2-5min  SVE: 2/50/-2 per Dr. Salgado    Medications:  lactated ringers.: 125 mL/Hr (01-15-23 @ 08:26)  oxytocin Infusion.: 2 mL/Hr (01-15-23 @ 09:40) @14 mU/min    Labs:                        12.2   15.40 )-----------( 140      ( 15 Vikash 2023 08:28 )             36.7           ABO RH Interpretation: O POS (01-15-23 @ 08:28)    Urinalysis Basic - ( 15 Vikash 2023 08:28 )    Color: Yellow / Appearance: Slightly Turbid / S.025 / pH: x  Gluc: x / Ketone: Negative  / Bili: Negative / Urobili: <2 mg/dL   Blood: x / Protein: 30 mg/dL / Nitrite: Negative   Leuk Esterase: Large / RBC: 3 /HPF / WBC 21 /HPF   Sq Epi: x / Non Sq Epi: 13 /HPF / Bacteria: Moderate                  
PGY 1 Note    Patient seen at bedside for evaluation of labor progression, reporting intermittent rectal pressure. Pitocin at 14U.    T(F): 98.24 (13:57)  HR: 82 (16:27)  BP: 94/52 (16:27)  RR: 19 (13:57)      EFM: 140/mod/+accels/+variable decels  TOCO: q2m  SVE: 10/100/0 per Dr. Lundberg    Labs:                        12.2   15.40 )-----------( 140      ( 15 Vikash 2023 08:28 )             36.7           ABO RH Interpretation: O POS (01-15-23 @ 08:28)    Urinalysis Basic - ( 15 Vikash 2023 08:28 )    Color: Yellow / Appearance: Slightly Turbid / S.025 / pH: x  Gluc: x / Ketone: Negative  / Bili: Negative / Urobili: <2 mg/dL   Blood: x / Protein: 30 mg/dL / Nitrite: Negative   Leuk Esterase: Large / RBC: 3 /HPF / WBC 21 /HPF   Sq Epi: x / Non Sq Epi: 13 /HPF / Bacteria: Moderate          Meds: citric acid/sodium citrate Solution 15 milliLiter(s) Oral every 6 hours  influenza   Vaccine 0.5 milliLiter(s) IntraMuscular once  lactated ringers 1000 milliLiter(s) IV Continuous <Continuous>  lactated ringers. 1000 milliLiter(s) IV Continuous <Continuous>  oxytocin Infusion 333.333 milliUNIT(s)/Min IV Continuous <Continuous>  oxytocin Infusion.  milliUNIT(s)/Min IV Continuous <Continuous>

## 2023-01-16 NOTE — DISCHARGE NOTE OB - NS AS DC STROKE DX YN
PREGNANCY   Result Value    URINE PREGNANCY,Guerrero.   no

## 2023-01-18 ENCOUNTER — APPOINTMENT (OUTPATIENT)
Dept: ANTEPARTUM | Facility: CLINIC | Age: 33
End: 2023-01-18

## 2023-01-18 ENCOUNTER — NON-APPOINTMENT (OUTPATIENT)
Age: 33
End: 2023-01-18

## 2023-01-20 DIAGNOSIS — Z28.09 IMMUNIZATION NOT CARRIED OUT BECAUSE OF OTHER CONTRAINDICATION: ICD-10-CM

## 2023-01-20 DIAGNOSIS — Z3A.39 39 WEEKS GESTATION OF PREGNANCY: ICD-10-CM

## 2023-01-20 DIAGNOSIS — Z03.74 ENCOUNTER FOR SUSPECTED PROBLEM WITH FETAL GROWTH RULED OUT: ICD-10-CM

## 2023-02-26 ENCOUNTER — NON-APPOINTMENT (OUTPATIENT)
Age: 33
End: 2023-02-26

## 2023-02-28 ENCOUNTER — APPOINTMENT (OUTPATIENT)
Dept: OBGYN | Facility: CLINIC | Age: 33
End: 2023-02-28
Payer: MEDICAID

## 2023-02-28 VITALS — HEIGHT: 69 IN | WEIGHT: 184 LBS | TEMPERATURE: 97.9 F | BODY MASS INDEX: 27.25 KG/M2

## 2023-02-28 DIAGNOSIS — Z86.19 PERSONAL HISTORY OF OTHER INFECTIOUS AND PARASITIC DISEASES: ICD-10-CM

## 2023-02-28 DIAGNOSIS — O28.3 ABNORMAL ULTRASONIC FINDING ON ANTENATAL SCREENING OF MOTHER: ICD-10-CM

## 2023-02-28 DIAGNOSIS — O35.8XX0 MATERNAL CARE FOR OTHER (SUSPECTED) FETAL ABNORMALITY AND DAMAGE, NOT APPLICABLE OR UNSPECIFIED: ICD-10-CM

## 2023-02-28 DIAGNOSIS — O09.299 SUPERVISION OF PREGNANCY WITH OTHER POOR REPRODUCTIVE OR OBSTETRIC HISTORY, UNSPECIFIED TRIMESTER: ICD-10-CM

## 2023-02-28 DIAGNOSIS — O09.819 SUPERVISION OF PREGNANCY RESULTING FROM ASSISTED REPRODUCTIVE TECHNOLOGY, UNSPECIFIED TRIMESTER: ICD-10-CM

## 2023-02-28 DIAGNOSIS — O44.02 COMPLETE PLACENTA PREVIA NOS OR WITHOUT HEMORRHAGE, SECOND TRIMESTER: ICD-10-CM

## 2023-02-28 PROCEDURE — 99213 OFFICE O/P EST LOW 20 MIN: CPT | Mod: TH

## 2023-04-27 NOTE — DISCHARGE NOTE PROVIDER - CARE PROVIDERS DIRECT ADDRESSES
,DirectAddress_Unknown [email or web address] 27-Apr-2023

## 2023-06-21 NOTE — DISCHARGE NOTE OB - NPI NUMBER (FOR SYSADMIN USE ONLY) :
Its higher than it used to be. But if you have been seeing hematology, I would defer to them  
[[id number]]

## 2023-06-22 ENCOUNTER — NON-APPOINTMENT (OUTPATIENT)
Age: 33
End: 2023-06-22

## 2023-06-29 NOTE — OB PROVIDER H&P - NSPREVIOUSINDUCEDTERMINATIONS_OBGYN_ALL_OB_NU
4th attempt to contact IV US. They state they are starting an IV upstairs and will come down after.    1

## 2023-07-10 NOTE — OB RN PATIENT PROFILE - NS_NUMBOFVISITS_OBGYN_ALL_OB_NU
Procedure Date: 07/02/2023    PREOPERATIVE DIAGNOSES:     1.  Cerebellar hemorrhage.  2.  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    POSTOPERATIVE DIAGNOSES:    1.  Cerebellar hemorrhage.  2.  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    TITLE OF PROCEDURE:    1.  Torcular craniotomy (combined midline suboccipital-occipital craniotomy).  3.  Supracerebellar, infratentorial approach for resection of cerebellar AVM.  4.  Intraoperative use of microscope.  5.  Intraoperative use of frameless stereotactic neuronavigation.  6.  Titanium mesh cranioplasty greater than 5 cm.    ANESTHESIA:  GETA.    ATTENDING SURGEON:  Evelina Carter MD    ASSISTANTS:  1.  Dr. Alex Linares.  2.  Dr. Binh Grace.    HISTORY OF PRESENT ILLNESS:  The patient is an 81-year-old woman now presenting with acute nausea and vomiting and dizziness.  A midline cerebellar hemorrhage was identified.  Further evaluation demonstrated a superior vermian arteriovenous malformation.  This is a grade II AVM.  Despite her advanced age, she is in good health otherwise and has good life expectancy.  She is at high risk for recurrent hemorrhage.  We have determined that treatment of this AVM with surgical resection is the best  treatment.  She presents for the above-stated procedures.    DESCRIPTION OF PROCEDURE:  Upon intubation and induction of general anesthesia, the Vargas headholder was applied.  She was placed in the prone position with her chest supported on gel rolls.  Her head was secured after flexing the neck.  We established a frameless stereotactic neuronavigation with the Stealth system.  We planned a midline occipital-suboccipital incision.  Her scalp and neck were then prepared and draped in the usual sterile fashion.  The planned incision was infiltrated with 0.25% bupivacaine with epinephrine.  A timeout was performed.    The incision was carried down to the subcutaneous  tissue, which was divided with monopolar cautery.  The cervicodorsal fascia and the galea were divided with monopolar cautery.  Subperiosteal dissection provided exposure of the external occipital protuberance and the subocciput.  Cerebellar retractors were placed in the wound.  We then planned a torcular craniotomy in 2 pieces.  The suboccipital craniotomy was completed after initiating katja holes with the .  We then extended the craniotomy in all directions using the high-speed cutting drill and Kerrison punches.  As expected, the dura was densely adherent to the inner table.  We then turned our attention to exposing the torcular and bilateral transverse sinuses.  We eggshelled this bone using the high-speed cutting drill.  Despite this technique, we had a laceration of the left transverse sinus.  This was tamponaded and then repaired with interrupted 4-0 Nurolon.  The torcular and the right transverse sinus were unaffected.  The torcular and the right transverse sinus were unaffected.  We opened the dura in a curvilinear fashion from the left superior corner to the right superior corner.  We then cut the falx cerebelli and retracted the dural flap superiorly against the transverse sinuses.  The cerebellum was relaxed and appeared normal.  The operative microscope was draped and brought into the field.    We took down the arachnoid adhesions between the tentorial surface of both cerebellar hemispheres and the vermis.  We then developed a supracerebellar, infratentorial corridor.  We identified a large arterialized vein draining into the tentorium.  The AVM nidus did not have any surface representation.  We used the neuronavigation to plan the entry into the vermis.  One of the superior vermian veins eccentric to the left side was cauterized and divided to facilitate exposure.  We extended the dissection to the junction of the upslope and downslope of the tentorial surface of the cerebellum.  At this  junction, we identified some subarachnoid blood.  We planned the corticectomy at the apex of the tentorial surface.  We planned our corticectomy based on the landmarks and with the aid of the neuronavigation.    Subpial dissection through the superior vermis led to the nidus of the AVM.  We entered the nidus in a few instances.  This resulted in bleeding, which was controlled with bipolar cautery and tamponade.  We then developed the plane around the nidus, which was facilitated by the previous hemorrhage.  The anterior aspect of the nidus was diffuse while the posterior and inferior aspect was more compact.  Feeders from the right superior cerebellar artery were cauterized and divided.  This reduced the tension in the large draining vein.  We continued dissection inferiorly and anteriorly periodically.  After circumferential dissection, the draining vein was cauterized and divided.  We applied a Hopela AVM clips to the stumps of the right superior cerebellar artery and the draining vein at the tentorial junction.  We inspected the resection cavity and there was no obvious residual AVM.  We identified some of the hematoma from the AVM rupture.  However, this hematoma was consolidated and extending inferiorly.  We decided against evacuation of this hematoma.  The patient's systolic blood pressures were then elevated into the 140s for 10 minutes.  There was no breakthrough bleeding and the systolic pressures were lowered below 100 for the remainder of the case.  The resection cavity was lined with Surgicel.  The specimen was sent off for permanent pathology.  We then closed the dura with a Durepair patch and interrupted and running 4-0 Nurolon.  A layer of dural sealant was then applied to the suture line.  The suboccipital bone flap was too small to replace and we incorporated this into a Synthes titanium mesh. We reconstructed the occiput and the subocciput with this titanium mesh cranioplasty, greater than 5 cm.   The wound was irrigated and bleeding points were secured.  The cervicodorsal fascia and galea were then closed with interrupted 2-0 Vicryls.  Subcutaneous tissue was closed with interrupted inverted 2-0 Vicryls.  The skin was closed with 3-0 running suture.  Sterile dressing was applied.  The patient was returned to the supine position and the head fried was removed.  She was extubated and transported to the ICU without incident.      Blood loss was approximately 500 mL. Sponge and needle counts were correct at the end of the case.  Part of a cotton scooter was transected by the high-speed cutting drill.  There was no obvious retained cotton in the field.  I was present for the key portions and immediately available for the entire operation.     Please note that the densely adherent dura created complexity and extended the time needed to complete this torcular craniotomy/craniectomy.  This added an extra 20% time and effort to the operation.    Evelina Carter MD        D: 07/10/2023   T: 07/10/2023   MT: nish    Name:     SUSHIL HURTADO  MRN:      [id number]        Account:        [id number]   :      1941           Procedure Date: 2023     Document: [id number]   10

## 2023-07-25 ENCOUNTER — NON-APPOINTMENT (OUTPATIENT)
Age: 33
End: 2023-07-25

## 2023-07-25 DIAGNOSIS — N39.46 MIXED INCONTINENCE: ICD-10-CM

## 2023-07-25 DIAGNOSIS — Z86.19 PERSONAL HISTORY OF OTHER INFECTIOUS AND PARASITIC DISEASES: ICD-10-CM

## 2023-07-27 ENCOUNTER — NON-APPOINTMENT (OUTPATIENT)
Age: 33
End: 2023-07-27

## 2023-07-27 RX ORDER — FLUCONAZOLE 150 MG/1
150 TABLET ORAL
Qty: 1 | Refills: 0 | Status: ACTIVE | COMMUNITY
Start: 2023-06-08 | End: 1900-01-01

## 2023-08-27 NOTE — ASU PATIENT PROFILE, ADULT - FALL HARM RISK - CONCLUSION
Physical Exam:   GENERAL APPEARANCE:  NAD, hemodynamically stable  T(C): 38.7 (08-27-23 @ 01:28), Max: 39.6 (08-26-23 @ 18:41)  HR: 75 (08-27-23 @ 01:51) (75 - 78)  BP: 130/58 (08-27-23 @ 01:51) (130/58 - 141/50)  RR: 16 (08-27-23 @ 01:51) (16 - 18)  SpO2: 96% (08-27-23 @ 01:51) (95% - 97%)  Wt(kg): --  HEENT:  Head is normocephalic    Skin:  Warm and dry without any rash   NECK:  Supple without lymphadenopathy.   HEART:  Regular rate and rhythm. normal S1 and S2, No M/R/G  LUNGS:  Good ins/exp effort, no W/R/R/C  ABDOMEN:  Soft, nontender, nondistended with good bowel sounds heard  EXTREMITIES:  Without cyanosis, clubbing or edema.   NEUROLOGICAL:  Gross nonfocal
Fall with Harm Risk

## 2023-09-15 NOTE — OB RN PATIENT PROFILE - EDUCATION PROVIDED ON ASSESSMENT OF INFANT "FEEDING CUES" AND THE IMPORTANCE OF FEEDING "ON CUE" / "BABY-LED" FEEDINGS
Statement Selected
Caprini Score 5 Moderate Risk, Surgical team should assess /Strongly recommend pharmacological and mechanical measures for VTE prophylaxis

## 2023-11-22 NOTE — PROCEDURAL SAFETY CHECKLIST WITH OR WITHOUT SEDATION - NSSPECIMENRECONSD_GEN_ALL_CORE
Patient is a 74y old  Male who presents with a chief complaint of confusion (21 Nov 2023 10:42)        INTERVAL HPI/OVERNIGHT EVENTS:   no complaints  pt seen and examined         Vital Signs Last 24 Hrs  T(C): 36.4 (22 Nov 2023 12:02), Max: 37.5 (21 Nov 2023 19:40)  T(F): 97.6 (22 Nov 2023 12:02), Max: 99.5 (21 Nov 2023 19:40)  HR: 87 (22 Nov 2023 12:02) (74 - 87)  BP: 172/80 (22 Nov 2023 16:42) (145/72 - 193/85)  BP(mean): --  RR: 17 (22 Nov 2023 12:02) (17 - 18)  SpO2: 94% (22 Nov 2023 12:02) (91% - 94%)    Parameters below as of 22 Nov 2023 12:02  Patient On (Oxygen Delivery Method): room air        acetaminophen     Tablet .. 650 milliGRAM(s) Oral every 6 hours PRN  atenolol  Tablet 50 milliGRAM(s) Oral two times a day  benzocaine 20% Spray 1 Spray(s) Topical four times a day  cholecalciferol 2000 Unit(s) Oral daily  diazepam    Tablet 5 milliGRAM(s) Oral every 12 hours  FIRST- Mouthwash  BLM 5 milliLiter(s) Swish and Spit three times a day  fludroCORTISONE 0.1 milliGRAM(s) Oral daily  fluvoxaMINE 150 milliGRAM(s) Oral two times a day  haloperidol     Tablet 1 milliGRAM(s) Oral every 12 hours  haloperidol    Injectable 1 milliGRAM(s) IntraMuscular every 12 hours PRN  heparin   Injectable 5000 Unit(s) SubCutaneous every 12 hours  lactulose Syrup 15 Gram(s) Oral two times a day PRN  losartan 25 milliGRAM(s) Oral daily  melatonin 3 milliGRAM(s) Oral at bedtime  mirtazapine 15 milliGRAM(s) Oral at bedtime  pantoprazole  Injectable 40 milliGRAM(s) IV Push daily  predniSONE   Tablet 5 milliGRAM(s) Oral daily  senna 2 Tablet(s) Oral at bedtime  sodium chloride 0.45%. 1000 milliLiter(s) IV Continuous <Continuous>      PHYSICAL EXAM:  GENERAL: NAD   EYES: conjunctiva and sclera clear  ENMT: Moist mucous membranes  NECK: Supple, No JVD, Normal thyroid  CHEST/LUNG: non labored, cta b/l  HEART: Regular rate and rhythm; No murmurs, rubs, or gallops  ABDOMEN: Soft, Nontender, Nondistended; Bowel sounds present  EXTREMITIES:  2+ Peripheral Pulses, No clubbing, no cyanosis, no edema  LYMPH: No lymphadenopathy noted  SKIN: No rashes or lesions  NEURO: no focal deficits    Consultant(s) Notes Reviewed:  [x ] YES  [ ] NO  Care Discussed with Consultants/Other Providers [ x] YES  [ ] NO    LABS:                        10.8   10.02 )-----------( 116      ( 21 Nov 2023 06:55 )             30.7     11-21    141  |  104  |  13  ----------------------------<  131<H>  4.0   |  33<H>  |  0.98    Ca    8.7      21 Nov 2023 06:55        Urinalysis Basic - ( 21 Nov 2023 06:55 )    Color: x / Appearance: x / SG: x / pH: x  Gluc: 131 mg/dL / Ketone: x  / Bili: x / Urobili: x   Blood: x / Protein: x / Nitrite: x   Leuk Esterase: x / RBC: x / WBC x   Sq Epi: x / Non Sq Epi: x / Bacteria: x      CAPILLARY BLOOD GLUCOSE            Urinalysis Basic - ( 21 Nov 2023 06:55 )    Color: x / Appearance: x / SG: x / pH: x  Gluc: 131 mg/dL / Ketone: x  / Bili: x / Urobili: x   Blood: x / Protein: x / Nitrite: x   Leuk Esterase: x / RBC: x / WBC x   Sq Epi: x / Non Sq Epi: x / Bacteria: x          RADIOLOGY & ADDITIONAL TESTS:    Imaging Personally Reviewed  Reviewed consultants input not applicable

## 2024-03-18 RX ORDER — PIMECROLIMUS 10 MG/G
1 CREAM TOPICAL TWICE DAILY
Qty: 1 | Refills: 2 | Status: ACTIVE | COMMUNITY
Start: 2024-03-18 | End: 1900-01-01

## 2024-03-28 NOTE — OB RN PATIENT PROFILE - NS_SOCIALWORKCONS_OBGYN_ALL_OB
Refill Decision Note   Michael Slaughter  is requesting a refill authorization.  Brief Assessment and Rationale for Refill:  Approve     Medication Therapy Plan:       Medication Reconciliation Completed: No   Comments:     No Care Gaps recommended.     Note composed:7:06 PM 03/27/2024           No

## 2024-04-08 ENCOUNTER — EMERGENCY (EMERGENCY)
Facility: HOSPITAL | Age: 34
LOS: 0 days | Discharge: HOME | End: 2024-04-08
Attending: EMERGENCY MEDICINE
Payer: MEDICAID

## 2024-04-08 VITALS
TEMPERATURE: 98 F | RESPIRATION RATE: 18 BRPM | OXYGEN SATURATION: 100 % | HEART RATE: 81 BPM | SYSTOLIC BLOOD PRESSURE: 109 MMHG | HEIGHT: 69 IN | DIASTOLIC BLOOD PRESSURE: 60 MMHG | WEIGHT: 169.98 LBS

## 2024-04-08 VITALS — DIASTOLIC BLOOD PRESSURE: 76 MMHG | TEMPERATURE: 98 F | SYSTOLIC BLOOD PRESSURE: 122 MMHG | HEART RATE: 76 BPM

## 2024-04-08 DIAGNOSIS — R19.7 DIARRHEA, UNSPECIFIED: ICD-10-CM

## 2024-04-08 DIAGNOSIS — Z98.890 OTHER SPECIFIED POSTPROCEDURAL STATES: Chronic | ICD-10-CM

## 2024-04-08 DIAGNOSIS — R10.33 PERIUMBILICAL PAIN: ICD-10-CM

## 2024-04-08 DIAGNOSIS — Z90.49 ACQUIRED ABSENCE OF OTHER SPECIFIED PARTS OF DIGESTIVE TRACT: Chronic | ICD-10-CM

## 2024-04-08 LAB
ALBUMIN SERPL ELPH-MCNC: 4.4 G/DL — SIGNIFICANT CHANGE UP (ref 3.5–5.2)
ALP SERPL-CCNC: 107 U/L — SIGNIFICANT CHANGE UP (ref 30–115)
ALT FLD-CCNC: 16 U/L — SIGNIFICANT CHANGE UP (ref 0–41)
ANION GAP SERPL CALC-SCNC: 10 MMOL/L — SIGNIFICANT CHANGE UP (ref 7–14)
APPEARANCE UR: CLEAR — SIGNIFICANT CHANGE UP
AST SERPL-CCNC: 18 U/L — SIGNIFICANT CHANGE UP (ref 0–41)
BASOPHILS # BLD AUTO: 0.02 K/UL — SIGNIFICANT CHANGE UP (ref 0–0.2)
BASOPHILS NFR BLD AUTO: 0.3 % — SIGNIFICANT CHANGE UP (ref 0–1)
BILIRUB DIRECT SERPL-MCNC: <0.2 MG/DL — SIGNIFICANT CHANGE UP (ref 0–0.3)
BILIRUB INDIRECT FLD-MCNC: >1.1 MG/DL — SIGNIFICANT CHANGE UP (ref 0.2–1.2)
BILIRUB SERPL-MCNC: 1.3 MG/DL — HIGH (ref 0.2–1.2)
BILIRUB UR-MCNC: NEGATIVE — SIGNIFICANT CHANGE UP
BUN SERPL-MCNC: 10 MG/DL — SIGNIFICANT CHANGE UP (ref 10–20)
CALCIUM SERPL-MCNC: 8.9 MG/DL — SIGNIFICANT CHANGE UP (ref 8.4–10.5)
CHLORIDE SERPL-SCNC: 104 MMOL/L — SIGNIFICANT CHANGE UP (ref 98–110)
CO2 SERPL-SCNC: 25 MMOL/L — SIGNIFICANT CHANGE UP (ref 17–32)
COLOR SPEC: YELLOW — SIGNIFICANT CHANGE UP
CREAT SERPL-MCNC: 0.7 MG/DL — SIGNIFICANT CHANGE UP (ref 0.7–1.5)
DIFF PNL FLD: NEGATIVE — SIGNIFICANT CHANGE UP
EGFR: 116 ML/MIN/1.73M2 — SIGNIFICANT CHANGE UP
EOSINOPHIL # BLD AUTO: 0.13 K/UL — SIGNIFICANT CHANGE UP (ref 0–0.7)
EOSINOPHIL NFR BLD AUTO: 1.7 % — SIGNIFICANT CHANGE UP (ref 0–8)
GLUCOSE SERPL-MCNC: 83 MG/DL — SIGNIFICANT CHANGE UP (ref 70–99)
GLUCOSE UR QL: NEGATIVE MG/DL — SIGNIFICANT CHANGE UP
HCG SERPL QL: NEGATIVE — SIGNIFICANT CHANGE UP
HCT VFR BLD CALC: 39.6 % — SIGNIFICANT CHANGE UP (ref 37–47)
HGB BLD-MCNC: 13.3 G/DL — SIGNIFICANT CHANGE UP (ref 12–16)
IMM GRANULOCYTES NFR BLD AUTO: 0.3 % — SIGNIFICANT CHANGE UP (ref 0.1–0.3)
KETONES UR-MCNC: NEGATIVE MG/DL — SIGNIFICANT CHANGE UP
LACTATE SERPL-SCNC: 1 MMOL/L — SIGNIFICANT CHANGE UP (ref 0.7–2)
LEUKOCYTE ESTERASE UR-ACNC: NEGATIVE — SIGNIFICANT CHANGE UP
LIDOCAIN IGE QN: 16 U/L — SIGNIFICANT CHANGE UP (ref 7–60)
LYMPHOCYTES # BLD AUTO: 2.08 K/UL — SIGNIFICANT CHANGE UP (ref 1.2–3.4)
LYMPHOCYTES # BLD AUTO: 26.9 % — SIGNIFICANT CHANGE UP (ref 20.5–51.1)
MCHC RBC-ENTMCNC: 30.7 PG — SIGNIFICANT CHANGE UP (ref 27–31)
MCHC RBC-ENTMCNC: 33.6 G/DL — SIGNIFICANT CHANGE UP (ref 32–37)
MCV RBC AUTO: 91.5 FL — SIGNIFICANT CHANGE UP (ref 81–99)
MONOCYTES # BLD AUTO: 0.48 K/UL — SIGNIFICANT CHANGE UP (ref 0.1–0.6)
MONOCYTES NFR BLD AUTO: 6.2 % — SIGNIFICANT CHANGE UP (ref 1.7–9.3)
NEUTROPHILS # BLD AUTO: 5.01 K/UL — SIGNIFICANT CHANGE UP (ref 1.4–6.5)
NEUTROPHILS NFR BLD AUTO: 64.6 % — SIGNIFICANT CHANGE UP (ref 42.2–75.2)
NITRITE UR-MCNC: NEGATIVE — SIGNIFICANT CHANGE UP
NRBC # BLD: 0 /100 WBCS — SIGNIFICANT CHANGE UP (ref 0–0)
PH UR: 6 — SIGNIFICANT CHANGE UP (ref 5–8)
PLATELET # BLD AUTO: 214 K/UL — SIGNIFICANT CHANGE UP (ref 130–400)
PMV BLD: 11.7 FL — HIGH (ref 7.4–10.4)
POTASSIUM SERPL-MCNC: 4.3 MMOL/L — SIGNIFICANT CHANGE UP (ref 3.5–5)
POTASSIUM SERPL-SCNC: 4.3 MMOL/L — SIGNIFICANT CHANGE UP (ref 3.5–5)
PROT SERPL-MCNC: 6.7 G/DL — SIGNIFICANT CHANGE UP (ref 6–8)
PROT UR-MCNC: NEGATIVE MG/DL — SIGNIFICANT CHANGE UP
RBC # BLD: 4.33 M/UL — SIGNIFICANT CHANGE UP (ref 4.2–5.4)
RBC # FLD: 12 % — SIGNIFICANT CHANGE UP (ref 11.5–14.5)
SODIUM SERPL-SCNC: 139 MMOL/L — SIGNIFICANT CHANGE UP (ref 135–146)
SP GR SPEC: 1.03 — SIGNIFICANT CHANGE UP (ref 1–1.03)
UROBILINOGEN FLD QL: 0.2 MG/DL — SIGNIFICANT CHANGE UP (ref 0.2–1)
WBC # BLD: 7.74 K/UL — SIGNIFICANT CHANGE UP (ref 4.8–10.8)
WBC # FLD AUTO: 7.74 K/UL — SIGNIFICANT CHANGE UP (ref 4.8–10.8)

## 2024-04-08 PROCEDURE — 83605 ASSAY OF LACTIC ACID: CPT

## 2024-04-08 PROCEDURE — 36415 COLL VENOUS BLD VENIPUNCTURE: CPT

## 2024-04-08 PROCEDURE — 81003 URINALYSIS AUTO W/O SCOPE: CPT

## 2024-04-08 PROCEDURE — 74177 CT ABD & PELVIS W/CONTRAST: CPT | Mod: 26,MC

## 2024-04-08 PROCEDURE — 87086 URINE CULTURE/COLONY COUNT: CPT

## 2024-04-08 PROCEDURE — 99285 EMERGENCY DEPT VISIT HI MDM: CPT

## 2024-04-08 PROCEDURE — 80076 HEPATIC FUNCTION PANEL: CPT

## 2024-04-08 PROCEDURE — 83690 ASSAY OF LIPASE: CPT

## 2024-04-08 PROCEDURE — 84703 CHORIONIC GONADOTROPIN ASSAY: CPT

## 2024-04-08 PROCEDURE — 74177 CT ABD & PELVIS W/CONTRAST: CPT | Mod: MC

## 2024-04-08 PROCEDURE — 80048 BASIC METABOLIC PNL TOTAL CA: CPT

## 2024-04-08 PROCEDURE — 99284 EMERGENCY DEPT VISIT MOD MDM: CPT | Mod: 25

## 2024-04-08 PROCEDURE — 85025 COMPLETE CBC W/AUTO DIFF WBC: CPT

## 2024-04-08 RX ORDER — SODIUM CHLORIDE 9 MG/ML
1000 INJECTION INTRAMUSCULAR; INTRAVENOUS; SUBCUTANEOUS ONCE
Refills: 0 | Status: COMPLETED | OUTPATIENT
Start: 2024-04-08 | End: 2024-04-08

## 2024-04-08 RX ADMIN — SODIUM CHLORIDE 1000 MILLILITER(S): 9 INJECTION INTRAMUSCULAR; INTRAVENOUS; SUBCUTANEOUS at 10:52

## 2024-04-08 NOTE — ED PROVIDER NOTE - PATIENT PORTAL LINK FT
You can access the FollowMyHealth Patient Portal offered by [facility] by registering at the following website: [email or web address]. By joining [employer]’s FollowMyHealth portal, you will also be able to view your health information using other applications (apps) compatible with our system.

## 2024-04-08 NOTE — ED ADULT NURSE NOTE - NSFALLUNIVINTERV_ED_ALL_ED
Bed/Stretcher in lowest position, wheels locked, appropriate side rails in place/Call bell, personal items and telephone in reach/Instruct patient to call for assistance before getting out of bed/chair/stretcher/Non-slip footwear applied when patient is off stretcher/Cassandra to call system/Physically safe environment - no spills, clutter or unnecessary equipment/Purposeful proactive rounding/Room/bathroom lighting operational, light cord in reach

## 2024-04-08 NOTE — ED PROVIDER NOTE - ATTENDING APP SHARED VISIT CONTRIBUTION OF CARE
Abdomen is nontender.  There is no CVA tenderness.  Diagnostic testing reviewed.  WBC is within normal limits.  This makes a serious bacterial infection less likely.  The urine analysis is not consistent with a UTI.  CAT scan is essentially negative.  In my opinion, outpatient follow-up and treatment are medically appropriate.

## 2024-04-09 LAB
CULTURE RESULTS: SIGNIFICANT CHANGE UP
SPECIMEN SOURCE: SIGNIFICANT CHANGE UP

## 2024-04-25 DIAGNOSIS — Z87.42 PERSONAL HISTORY OF OTHER DISEASES OF THE FEMALE GENITAL TRACT: ICD-10-CM

## 2024-04-26 ENCOUNTER — ASOB RESULT (OUTPATIENT)
Age: 34
End: 2024-04-26

## 2024-04-26 ENCOUNTER — APPOINTMENT (OUTPATIENT)
Dept: OBGYN | Facility: CLINIC | Age: 34
End: 2024-04-26
Payer: MEDICAID

## 2024-04-26 VITALS — SYSTOLIC BLOOD PRESSURE: 124 MMHG | DIASTOLIC BLOOD PRESSURE: 70 MMHG

## 2024-04-26 VITALS — HEIGHT: 69 IN | BODY MASS INDEX: 24.59 KG/M2 | WEIGHT: 166 LBS

## 2024-04-26 DIAGNOSIS — R10.9 UNSPECIFIED ABDOMINAL PAIN: ICD-10-CM

## 2024-04-26 DIAGNOSIS — R10.2 PELVIC AND PERINEAL PAIN: ICD-10-CM

## 2024-04-26 DIAGNOSIS — Z01.419 ENCOUNTER FOR GYNECOLOGICAL EXAMINATION (GENERAL) (ROUTINE) W/OUT ABNORMAL FINDINGS: ICD-10-CM

## 2024-04-26 PROCEDURE — 99395 PREV VISIT EST AGE 18-39: CPT | Mod: 25

## 2024-04-26 PROCEDURE — 76830 TRANSVAGINAL US NON-OB: CPT

## 2024-04-26 NOTE — HISTORY OF PRESENT ILLNESS
[FreeTextEntry1] : 33 yo  LMP 24 presents for annual visit. She denies breast pain, breast changes, abnormal vaginal bleeding, vaginal discharge, dyspareunia, or urinary incontinence.  She notes that last week she had LLQ abdominal pain radiating down into her pelvis, was intense for a few days, she went to ED where she had exam to rule out hernia and she had normal bloodwork. Pain is resolved now.  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 Regular, monthly menses PMH: Denies PSH: Laparoscopy Meds: none

## 2024-04-26 NOTE — DISCUSSION/SUMMARY
[FreeTextEntry1] : 35 yo  LMP 24 with resolved abdominal and pelvic pain, with normal annual exam - Pap smear and HPV collected - Discussed normal abdominal and pelvic exams. Site of pain was not near her well healed scars and there is no evidence of hernia. - Pelvic ultrasound done today, normal and appropriate for menstrual cycle. Discussed dominant follicle normal size now, I suspect it was larger prior to ovulation and what she felt was ovulation pain. All questions answered - Next annual 1 year

## 2024-04-26 NOTE — PHYSICAL EXAM
[Chaperone Present] : A chaperone was present in the examining room during all aspects of the physical examination [Appropriately responsive] : appropriately responsive [Alert] : alert [No Acute Distress] : no acute distress [Soft] : soft [Non-tender] : non-tender [Non-distended] : non-distended [No Lesions] : no lesions [No Mass] : no mass [Oriented x3] : oriented x3 [Examination Of The Breasts] : a normal appearance [No Masses] : no breast masses were palpable [Labia Majora] : normal [Labia Minora] : normal [No Bleeding] : There was no active vaginal bleeding [Normal] : normal [Anteversion] : anteverted [Uterine Adnexae] : normal [FreeTextEntry2] : Pt's children [FreeTextEntry7] : Well healed old laparoscopic scars. [Tenderness] : nontender [Enlarged ___ wks] : not enlarged [Mass ___ cm] : no uterine mass was palpated

## 2024-04-30 LAB — HPV HIGH+LOW RISK DNA PNL CVX: NOT DETECTED

## 2024-05-01 LAB — CYTOLOGY CVX/VAG DOC THIN PREP: NORMAL

## 2024-07-01 ENCOUNTER — EMERGENCY (EMERGENCY)
Facility: HOSPITAL | Age: 34
LOS: 0 days | Discharge: HOME | End: 2024-07-01
Attending: EMERGENCY MEDICINE
Payer: MEDICAID

## 2024-07-01 VITALS
WEIGHT: 167.99 LBS | SYSTOLIC BLOOD PRESSURE: 110 MMHG | TEMPERATURE: 98 F | HEIGHT: 69 IN | HEART RATE: 65 BPM | RESPIRATION RATE: 18 BRPM | OXYGEN SATURATION: 99 % | DIASTOLIC BLOOD PRESSURE: 74 MMHG

## 2024-07-01 DIAGNOSIS — Z90.49 ACQUIRED ABSENCE OF OTHER SPECIFIED PARTS OF DIGESTIVE TRACT: Chronic | ICD-10-CM
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
## 2024-07-01 DIAGNOSIS — S61.211A LACERATION WITHOUT FOREIGN BODY OF LEFT INDEX FINGER WITHOUT DAMAGE TO NAIL, INITIAL ENCOUNTER: ICD-10-CM
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
## 2024-07-01 DIAGNOSIS — Z98.890 OTHER SPECIFIED POSTPROCEDURAL STATES: Chronic | ICD-10-CM

## 2024-07-01 PROCEDURE — 99283 EMERGENCY DEPT VISIT LOW MDM: CPT

## 2024-07-01 PROCEDURE — 99284 EMERGENCY DEPT VISIT MOD MDM: CPT

## 2024-07-01 RX ORDER — LIDOCAINE HYDROCHLORIDE 20 MG/ML
30 INJECTION, SOLUTION EPIDURAL; INFILTRATION; INTRACAUDAL; PERINEURAL ONCE
Refills: 0 | Status: COMPLETED | OUTPATIENT
Start: 2024-07-01 | End: 2024-07-01

## 2024-07-01 RX ORDER — ESCITALOPRAM OXALATE 20 MG/1
1 TABLET, FILM COATED ORAL
Refills: 0 | DISCHARGE

## 2024-07-01 RX ORDER — BUPROPION HYDROCHLORIDE 150 MG/1
0 TABLET, EXTENDED RELEASE ORAL
Qty: 0 | Refills: 0 | DISCHARGE

## 2024-07-01 RX ADMIN — LIDOCAINE HYDROCHLORIDE 30 MILLILITER(S): 20 INJECTION, SOLUTION EPIDURAL; INFILTRATION; INTRACAUDAL; PERINEURAL at 15:00

## 2024-11-13 NOTE — ASU DISCHARGE PLAN (ADULT/PEDIATRIC) - PHYSICIAN SECTION COMPLETE
"Daily Note     Today's date: 2024  Patient name: Roslyn Manzo  : 1942  MRN: [id number]  Referring provider: Adina Schwarz CRNP  Dx:   Encounter Diagnosis     ICD-10-CM    1. Acute right-sided low back pain, unspecified whether sciatica present  M54.50       2. Hip pain, bilateral  M25.551     M25.552                      Subjective: Roslyn reports getting injections to lawson SI joints and reports less pain overall in LB.       Objective: See treatment diary below      Assessment: Visual and VC to ensure correct exercise technique. Initiated gentle aerobic exercise and self stretching with good tolerance. Pt had some discomfort with PPT therefore only performed 10 reps. Cues for relaxation during manual stretching of lawson LE. Pt taught log roll when getting OOB to avoid putting excess strain on LB.       Plan: Continue with current POC to address pt deficits.      Precautions: Hx Falls,  Hx L-spine decompression surgery, Bilateral TKR       Manuals 11      Gentle stretch/PROM BLE 15' 15'                              Neuro Re-Ed         PPT  5\" x10      PPT w/ march        Physioball abd isometric        LTP/MTP (posture)                                Ther Ex        Nustep for LE strength/ROM  Lv3 10'      TR/HR        Mini squat        Seated trunk flexion stretch w/ physioball  10\"x10 fwd, and to L       Supine clam shell        LTR  5\"x20              HEP  Log roll 5'       Ther Activity                        Gait Training                        Modalities        MHP/CP 10'  L-spine Declined                      " Yes

## 2024-12-19 RX ORDER — SODIUM CHLORIDE FOR INHALATION 0.9 %
0.9 VIAL, NEBULIZER (ML) INHALATION
Qty: 1 | Refills: 1 | Status: ACTIVE | COMMUNITY
Start: 2024-12-19 | End: 1900-01-01

## 2024-12-30 NOTE — ED ADULT NURSE NOTE - CAS DISCH TRANSFER METHOD
Ericka Ballard is a 25 year old  at 25w3d with an CHRISTINE of 2025 who is here for routine obstetric visit.      Pregnancy complicated by UTI, CT (negative SALIMA), and hx HSV.      Reports no complaints.  Denies leaking of fluid, vaginal bleeding, uterine contractions, headache, vision changes, RUQ (right upper quadrant) pain, dysuria, fever, or rash.      Fetal movement Present    Treated for UTI 2024 and completed treatment.  SALIMA today.     Discussed glucose screening/third trimester labs at next visit.  Encouraged to avoid eating/drinking sweets 2 hours prior to appointment time and to plan to spend about one hour at the clinic.  Agrees.      RTC (Return to clinic) in 3 weeks or sooner if needed.  Reviewed danger signs and symptoms. Discussed fetal movement counting.  Discussed when to call/present to OB triage and 24 hour CNM number.    Gianna Cottrell, WILMA      Prenatal care  - Prenatal labs: blood type O+, Ab neg, Hgb/Hct: 13.9/41.2, Plts: 263, HIV NR, RPR NR, Hep B and C negative, varicella immune, rubella immune, urine culture neg, GC/Trich neg, Chlamydia pos  - Continue prenatal vitamin  - Genetic Screen: declines  MSAFP declines  - 10/21/24 Dating U/S: 14w3d  CHRISTINE 24  - 24 Anatomy U/S 19w3d: breech, ant plac, MVP 5.56, AC 50%, EFW 27%. Follow up as clinically indicated  - 28 week labs: to be collected next visit   - 36 week labs: to be collected  - Influenza vaccine: declines  - Tdap vaccine: offer 28-36wks  - RSV vaccine: offer 32-36wks    Birth Plan  Labor: interested in waterbirth   Infant:  girl (boy at home)  Postpartum birth control:  Pediatrician:  Dr. Alberto GARAY in [place]  Feeding:  Placenta:   meds:  Visitor policy:  Pelvic PT:      Chlamydia  - 24 positive  - 10/21/24 SALIMA negative      History of HSV  - Reports last outbreak in   - Start Valtrex prophylaxis at 36wks        
Private car

## 2025-01-22 NOTE — OB RN PATIENT PROFILE - PRO INTERPRETER NEED 2
Chief Complaint   Patient presents with    Office Visit     Anxiety.  Has lumps in her breast.  Noticed a couple weeks ago.         HISTORY OF PRESENT ILLNESS     13-year-old female here for a follow-up of anxiety and breast lumps. She is accompanied by her mother.    Anxiety.  She is currently taking fluoxetine 10 mg daily.  She reports a positive response to fluoxetine, noting a decrease in her anxiety levels. Her mother corroborates this, observing that the patient experiences fewer panic attacks and has developed an improved ability to manage them. Previously, she experienced panic attacks several times a week, but these have become less frequent since starting the medication. She continues to experience occasional days of heightened anxiety upon waking, but she is now able to overcome these feelings and proceed with her day without significant distress. She reports no side effects from the fluoxetine, such as diarrhea, constipation, nausea, or headaches. Her sleep pattern remains unchanged, with occasional difficulty in falling asleep. She has not yet started counseling but has an appointment scheduled for 04/2024.     She has detected palpable masses in her left breast, which her mother has confirmed. These masses were first noticed over a month ago and have been intermittently present since then. Her menstrual cycle ended 7 days ago, having started on 01/11/2024 and concluded on 01/15/2024.      Review Flowsheet  More data exists         1/22/2025   PHQ 2/9 Score   Peds PHQ 2 Score 0   Peds PHQ 2 Interpretation No further screening needed   Feeling down, depressed, irritable or hopeless? Not at all   Little interest or pleasure in activity? Not at all   Peds PHQ 9 Score 2   Peds PHQ 9 Interpretation Minimal Depression   Trouble falling or staying asleep or sleeping too much? Not at all   Poor appetite, weight loss, or overeating? Not at all   Feeling tired or having little energy? Not at all   Feeling bad  about yourself or that you are a failure or have let yourself or family down? Several days   Trouble concentrating on things such as school work, reading, or watching TV? Several days   Moving or speaking slowly that other people have noticed or the opposite - being so fidgety or restless that you have been moving around a lot more than usual? Not at all   Thoughts that you would be better off dead or of hurting yourself in some way? Not at all      Details                   Last four GAD7 Assessments           1/22/2025     4:00 PM 12/5/2024    10:45 AM   GAD7 Screening   GAD7 Score 10 13   Feeling nervous, anxious or on edge Several days More than half the days   Not being able to stop or control worrying Several days More than half the days   Worrying too much about different things Several days More than half the days   Trouble relaxing More than half the days Several days   Being so restless that it's hard to sit still More than half the days Several days   Becoming easily annoyed or irritable Not at all More than half the days   Feeling afraid as if something awful might happen Nearly every day Nearly every day   Ability to handle work, home and other people Not difficult at all       PAST MEDICAL, FAMILY AND SOCIAL HISTORY     The following histories were personally reviewed and updated.  Past Medical History, Current medications, and Allergies    REVIEW OF SYSTEMS     Review of Systems   Gastrointestinal:  Negative for abdominal pain, diarrhea, nausea and vomiting.   Neurological:  Negative for dizziness and headaches.   Psychiatric/Behavioral:  Negative for dysphoric mood and sleep disturbance. The patient is nervous/anxious.      PHYSICAL EXAM   Visit Vitals  /61 (BP Location: RUE - Right upper extremity, Patient Position: Sitting, Cuff Size: Regular)   Pulse 86   Ht 4' 11.25\" (1.505 m)   Wt 48.5 kg (106 lb 14.4 oz)   LMP 11/17/2024 (Exact Date)   SpO2 98%   BMI 21.41 kg/m²       Physical  Exam  Vitals and nursing note reviewed.   Constitutional:       General: She is not in acute distress.     Appearance: She is not diaphoretic.   HENT:      Head: Normocephalic and atraumatic.   Chest:      Comments: Normal bilateral breast exam without any palpable lumps  Neurological:      Mental Status: She is alert and oriented to person, place, and time.   Psychiatric:         Mood and Affect: Mood normal.         Behavior: Behavior normal.         Thought Content: Thought content normal.         Judgment: Judgment normal.       ASSESSMENT/PLAN     Chelsey was seen today for office visit.    Diagnoses and all orders for this visit:    Anxiety  GAD7 = 13 --> 10  Discussed that based off of the questionnaire there has been some improvement in her anxiety, which her and her mother both agree with  Advised we will have her try increasing the fluoxetine to 20 mg daily to see if that helps reduce her anxiety levels further  Start FLUoxetine (PROzac) 20 MG capsule; Take 1 capsule by mouth daily.  Keep therapy appointment scheduled for 4/23/2025    Celiac disease (CMD)  Mom requests a referral for her to see GI for her celiac's  SERVICE TO PEDIATRIC GASTROENTEROLOGY    Discussed that on breast exam there is no palpable lumps in her breast tissue all feels normal.  Suspect that it may have been a hormonal changes associated with her menstrual cycle.  Will monitor for now.    Return in about 6 weeks (around 3/5/2025) for Anxiety.    Patient's preferred pharmacy is:    [id number]  [place] - [id number] AT [facility] [place] [address]  [id number] [place] [address]  Phone: [phone] Fax: [phone]      English

## 2025-02-19 ENCOUNTER — OUTPATIENT (OUTPATIENT)
Dept: OUTPATIENT SERVICES | Facility: HOSPITAL | Age: 35
LOS: 1 days | End: 2025-02-19
Payer: MEDICAID

## 2025-02-19 DIAGNOSIS — Z98.890 OTHER SPECIFIED POSTPROCEDURAL STATES: Chronic | ICD-10-CM

## 2025-02-19 DIAGNOSIS — Z90.49 ACQUIRED ABSENCE OF OTHER SPECIFIED PARTS OF DIGESTIVE TRACT: Chronic | ICD-10-CM

## 2025-02-19 DIAGNOSIS — Z00.8 ENCOUNTER FOR OTHER GENERAL EXAMINATION: ICD-10-CM

## 2025-02-19 DIAGNOSIS — R51.9 HEADACHE, UNSPECIFIED: ICD-10-CM

## 2025-02-19 PROCEDURE — 70553 MRI BRAIN STEM W/O & W/DYE: CPT

## 2025-02-19 PROCEDURE — 70553 MRI BRAIN STEM W/O & W/DYE: CPT | Mod: 26

## 2025-02-19 PROCEDURE — A9579: CPT

## 2025-02-20 DIAGNOSIS — R51.9 HEADACHE, UNSPECIFIED: ICD-10-CM

## 2025-04-02 NOTE — ED PROVIDER NOTE - DISCHARGE REVIEW MATERIAL PRESENTED
Rx Refill Note  Requested Prescriptions     Pending Prescriptions Disp Refills    Continuous Glucose Sensor (Dexcom G6 Sensor) 9 each 3     Sig: Use Every 10 (Ten) Days.      Last office visit with prescribing clinician: 3/20/2025    Next office visit with prescribing clinician: 8/14/2025               .

## 2025-05-09 ENCOUNTER — APPOINTMENT (OUTPATIENT)
Dept: OBGYN | Facility: CLINIC | Age: 35
End: 2025-05-09

## 2025-07-16 ENCOUNTER — OUTPATIENT (OUTPATIENT)
Dept: OUTPATIENT SERVICES | Facility: HOSPITAL | Age: 35
LOS: 1 days | End: 2025-07-16
Payer: MEDICAID

## 2025-07-16 DIAGNOSIS — G35 MULTIPLE SCLEROSIS: ICD-10-CM

## 2025-07-16 DIAGNOSIS — Z90.49 ACQUIRED ABSENCE OF OTHER SPECIFIED PARTS OF DIGESTIVE TRACT: Chronic | ICD-10-CM

## 2025-07-16 DIAGNOSIS — Z98.890 OTHER SPECIFIED POSTPROCEDURAL STATES: Chronic | ICD-10-CM

## 2025-07-16 DIAGNOSIS — Z00.8 ENCOUNTER FOR OTHER GENERAL EXAMINATION: ICD-10-CM

## 2025-07-16 PROCEDURE — 72157 MRI CHEST SPINE W/O & W/DYE: CPT

## 2025-07-16 PROCEDURE — A9579: CPT

## 2025-07-16 PROCEDURE — 72157 MRI CHEST SPINE W/O & W/DYE: CPT | Mod: 26

## 2025-07-17 DIAGNOSIS — G35 MULTIPLE SCLEROSIS: ICD-10-CM

## 2025-09-03 ENCOUNTER — NON-APPOINTMENT (OUTPATIENT)
Age: 35
End: 2025-09-03

## 2025-09-03 ENCOUNTER — APPOINTMENT (OUTPATIENT)
Dept: OBGYN | Facility: CLINIC | Age: 35
End: 2025-09-03
Payer: MEDICAID

## 2025-09-03 VITALS — HEIGHT: 69 IN | WEIGHT: 168 LBS | BODY MASS INDEX: 24.88 KG/M2

## 2025-09-03 PROCEDURE — 99395 PREV VISIT EST AGE 18-39: CPT

## 2025-09-03 PROCEDURE — 99459 PELVIC EXAMINATION: CPT

## 2025-09-03 PROCEDURE — 99212 OFFICE O/P EST SF 10 MIN: CPT | Mod: 25

## 2025-09-04 LAB
FSH SERPL-MCNC: 7.6 IU/L
LH SERPL-ACNC: 5.6 IU/L
TSH SERPL-ACNC: 1.12 UIU/ML